# Patient Record
Sex: FEMALE | Race: WHITE | Employment: FULL TIME | URBAN - METROPOLITAN AREA
[De-identification: names, ages, dates, MRNs, and addresses within clinical notes are randomized per-mention and may not be internally consistent; named-entity substitution may affect disease eponyms.]

---

## 2017-01-19 ENCOUNTER — NURSE ONLY (OUTPATIENT)
Dept: NEPHROLOGY | Facility: CLINIC | Age: 47
End: 2017-01-19

## 2017-01-19 DIAGNOSIS — Z01.84 IMMUNITY STATUS TESTING: Primary | ICD-10-CM

## 2017-01-19 PROCEDURE — 90732 PPSV23 VACC 2 YRS+ SUBQ/IM: CPT | Performed by: INTERNAL MEDICINE

## 2017-01-19 PROCEDURE — 90471 IMMUNIZATION ADMIN: CPT | Performed by: INTERNAL MEDICINE

## 2017-01-24 RX ORDER — ROSUVASTATIN CALCIUM 5 MG/1
5 TABLET, COATED ORAL NIGHTLY
Qty: 30 TABLET | Refills: 5 | Status: SHIPPED | OUTPATIENT
Start: 2017-01-24 | End: 2017-01-27 | Stop reason: DRUGHIGH

## 2017-01-24 RX ORDER — LEVOTHYROXINE SODIUM 88 UG/1
88 TABLET ORAL
Qty: 30 TABLET | Refills: 5 | Status: SHIPPED | OUTPATIENT
Start: 2017-01-24 | End: 2017-02-24

## 2017-01-24 NOTE — TELEPHONE ENCOUNTER
From: Lourdes Thompson  To: Marquis Beka MD  Sent: 1/24/2017 6:17 AM CST  Subject: Medication Renewal Request    Original authorizing provider: MD Lourdes Taylor would like a refill of the following medications:  Rosuvastatin Calci

## 2017-01-26 ENCOUNTER — TELEPHONE (OUTPATIENT)
Dept: NEPHROLOGY | Facility: CLINIC | Age: 47
End: 2017-01-26

## 2017-01-26 DIAGNOSIS — E78.00 PURE HYPERCHOLESTEROLEMIA: ICD-10-CM

## 2017-01-26 DIAGNOSIS — E55.9 VITAMIN D DEFICIENCY: Primary | ICD-10-CM

## 2017-01-26 NOTE — TELEPHONE ENCOUNTER
Labs done 1/19/17 Per Dr. Allison Guess good except bad Cholesterol is a little high. Increase Crestor to 10mg daily. Thyroid good but Vitamin D is very low. Start Vitamin D 2000 International units daily.  Do Lipid panel, Liver panel and Vitamin D level in

## 2017-01-27 RX ORDER — MULTIVIT-MIN/IRON/FOLIC ACID/K 18-600-40
2000 CAPSULE ORAL DAILY
Qty: 30 CAPSULE | Refills: 0 | Status: SHIPPED | OUTPATIENT
Start: 2017-01-27 | End: 2017-02-26

## 2017-01-27 RX ORDER — ROSUVASTATIN CALCIUM 10 MG/1
10 TABLET, COATED ORAL NIGHTLY
Qty: 30 TABLET | Refills: 5 | Status: SHIPPED | OUTPATIENT
Start: 2017-01-27 | End: 2017-03-06

## 2017-01-27 NOTE — TELEPHONE ENCOUNTER
Patient contacted. Results message read. Patient is aware to increase Crestor to 10mg daily and start Vitamin D 2000 International units daily. New prescription sent to pharmacy. Lab orders placed to be done in 2 months. Med list updated.

## 2017-02-08 ENCOUNTER — APPOINTMENT (OUTPATIENT)
Dept: HEMATOLOGY/ONCOLOGY | Facility: HOSPITAL | Age: 47
End: 2017-02-08
Attending: INTERNAL MEDICINE
Payer: COMMERCIAL

## 2017-02-20 ENCOUNTER — OFFICE VISIT (OUTPATIENT)
Dept: HEMATOLOGY/ONCOLOGY | Facility: HOSPITAL | Age: 47
End: 2017-02-20
Attending: NURSE PRACTITIONER
Payer: COMMERCIAL

## 2017-02-20 ENCOUNTER — NURSE ONLY (OUTPATIENT)
Dept: HEMATOLOGY/ONCOLOGY | Facility: HOSPITAL | Age: 47
End: 2017-02-20
Attending: INTERNAL MEDICINE
Payer: COMMERCIAL

## 2017-02-20 VITALS
DIASTOLIC BLOOD PRESSURE: 75 MMHG | RESPIRATION RATE: 16 BRPM | TEMPERATURE: 98 F | WEIGHT: 256 LBS | SYSTOLIC BLOOD PRESSURE: 119 MMHG | HEIGHT: 66 IN | BODY MASS INDEX: 41.14 KG/M2 | HEART RATE: 78 BPM

## 2017-02-20 VITALS
HEART RATE: 78 BPM | RESPIRATION RATE: 16 BRPM | SYSTOLIC BLOOD PRESSURE: 119 MMHG | DIASTOLIC BLOOD PRESSURE: 75 MMHG | TEMPERATURE: 98 F

## 2017-02-20 DIAGNOSIS — Z95.828 PORT CATHETER IN PLACE: ICD-10-CM

## 2017-02-20 DIAGNOSIS — Z45.2 ENCOUNTER FOR ADJUSTMENT OR MANAGEMENT OF VASCULAR ACCESS DEVICE: Primary | ICD-10-CM

## 2017-02-20 DIAGNOSIS — C84.78 ANAPLASTIC LARGE CELL LYMPHOMA, ALK-NEGATIVE, LYMPH NODES OF MULTIPLE SITES (HCC): Primary | ICD-10-CM

## 2017-02-20 LAB
ALBUMIN SERPL BCP-MCNC: 3.6 G/DL (ref 3.5–4.8)
ALBUMIN/GLOB SERPL: 1.2 {RATIO} (ref 1–2)
ALP SERPL-CCNC: 75 U/L (ref 32–100)
ALT SERPL-CCNC: 22 U/L (ref 14–54)
ANION GAP SERPL CALC-SCNC: 10 MMOL/L (ref 0–18)
AST SERPL-CCNC: 28 U/L (ref 15–41)
BASOPHILS # BLD: 0.1 K/UL (ref 0–0.2)
BASOPHILS NFR BLD: 1 %
BILIRUB SERPL-MCNC: 0.6 MG/DL (ref 0.3–1.2)
BUN SERPL-MCNC: 9 MG/DL (ref 8–20)
BUN/CREAT SERPL: 12.2 (ref 10–20)
CALCIUM SERPL-MCNC: 9 MG/DL (ref 8.5–10.5)
CHLORIDE SERPL-SCNC: 104 MMOL/L (ref 95–110)
CO2 SERPL-SCNC: 26 MMOL/L (ref 22–32)
CREAT SERPL-MCNC: 0.74 MG/DL (ref 0.5–1.5)
EOSINOPHIL # BLD: 0.1 K/UL (ref 0–0.7)
EOSINOPHIL NFR BLD: 2 %
ERYTHROCYTE [DISTWIDTH] IN BLOOD BY AUTOMATED COUNT: 14.7 % (ref 11–15)
GLOBULIN PLAS-MCNC: 3.1 G/DL (ref 2.5–3.7)
GLUCOSE SERPL-MCNC: 136 MG/DL (ref 70–99)
HCT VFR BLD AUTO: 41.8 % (ref 35–48)
HGB BLD-MCNC: 13.7 G/DL (ref 12–16)
LDH SERPL L TO P-CCNC: 153 U/L (ref 98–192)
LYMPHOCYTES # BLD: 2.5 K/UL (ref 1–4)
LYMPHOCYTES NFR BLD: 39 %
MCH RBC QN AUTO: 29.1 PG (ref 27–32)
MCHC RBC AUTO-ENTMCNC: 32.8 G/DL (ref 32–37)
MCV RBC AUTO: 88.7 FL (ref 80–100)
MONOCYTES # BLD: 0.4 K/UL (ref 0–1)
MONOCYTES NFR BLD: 7 %
NEUTROPHILS # BLD AUTO: 3.3 K/UL (ref 1.8–7.7)
NEUTROPHILS NFR BLD: 51 %
OSMOLALITY UR CALC.SUM OF ELEC: 291 MOSM/KG (ref 275–295)
PLATELET # BLD AUTO: 200 K/UL (ref 140–400)
PMV BLD AUTO: 8.7 FL (ref 7.4–10.3)
POTASSIUM SERPL-SCNC: 3.4 MMOL/L (ref 3.3–5.1)
PROT SERPL-MCNC: 6.7 G/DL (ref 5.9–8.4)
RBC # BLD AUTO: 4.71 M/UL (ref 3.7–5.4)
SODIUM SERPL-SCNC: 140 MMOL/L (ref 136–144)
WBC # BLD AUTO: 6.4 K/UL (ref 4–11)

## 2017-02-20 PROCEDURE — 99212 OFFICE O/P EST SF 10 MIN: CPT | Performed by: NURSE PRACTITIONER

## 2017-02-20 PROCEDURE — 36591 DRAW BLOOD OFF VENOUS DEVICE: CPT

## 2017-02-20 PROCEDURE — 83615 LACTATE (LD) (LDH) ENZYME: CPT

## 2017-02-20 PROCEDURE — 99213 OFFICE O/P EST LOW 20 MIN: CPT | Performed by: NURSE PRACTITIONER

## 2017-02-20 PROCEDURE — 85025 COMPLETE CBC W/AUTO DIFF WBC: CPT

## 2017-02-20 PROCEDURE — 80053 COMPREHEN METABOLIC PANEL: CPT

## 2017-02-20 RX ORDER — HEPARIN SODIUM (PORCINE) LOCK FLUSH IV SOLN 100 UNIT/ML 100 UNIT/ML
5 SOLUTION INTRAVENOUS ONCE
Status: COMPLETED | OUTPATIENT
Start: 2017-02-20 | End: 2017-02-20

## 2017-02-20 RX ADMIN — HEPARIN SODIUM (PORCINE) LOCK FLUSH IV SOLN 100 UNIT/ML 500 UNITS: 100 SOLUTION INTRAVENOUS at 13:55:00

## 2017-02-20 NOTE — PROGRESS NOTES
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 Karoline Rahman is seen in the office for follow up regarding ALK negative an daily. Disp: 30 capsule Rfl: 0   Levothyroxine Sodium 88 MCG Oral Tab Take 1 tablet (88 mcg total) by mouth before breakfast. Disp: 30 tablet Rfl: 5   magnesium oxide 400 MG Oral Tab Take 400 mg by mouth daily.  Disp:  Rfl:      Allergies:     Aspirin Hemochromatosis   • Melanoma Maternal Grandfather          PHYSICAL EXAM:    /75 mmHg  Pulse 78  Temp(Src) 97.8 °F (36.6 °C) (Oral)  Resp 16  Ht 1.676 m (5' 6\")  Wt 116.121 kg (256 lb)  BMI 41.34 kg/m2    Physical Exam   Constitutional: She is orien evidence of recurrence. Labs were reviewed and shows normal labs including LDH. Will continue close monitoring. Surveillance CT imaging in July shows no evidence of recurrence. All questions were answered to satisfaction.    Pt is 2 years out from [facility] Absolute 3.3 1.8-7.7 K/UL   Lymphocyte Absolute 2.5 1.0-4.0 K/UL   Monocyte Absolute 0.4 0.0-1.0 K/UL   Eosinophil Absolute 0.1 0.0-0.7 K/UL   Basophil Absolute 0.1 0.0-0.2 K/UL       Meds This Visit:        Imaging & Referrals:  None   No orders of the de

## 2017-02-20 NOTE — PROGRESS NOTES
Pt to infusion for labs and port flush. Arrived stable and ambulatory. Reports feeling well, offers no complaints. Port accessed using sterile technique, line flushing well with positive blood return noted. Labs were collected and sent.  Port flushed per pr

## 2017-02-24 RX ORDER — LEVOTHYROXINE SODIUM 88 UG/1
88 TABLET ORAL
Qty: 30 TABLET | Refills: 5 | Status: SHIPPED | OUTPATIENT
Start: 2017-02-24 | End: 2017-03-31

## 2017-02-24 NOTE — TELEPHONE ENCOUNTER
From: Ross Sousa  To: Clotilde Mack MD  Sent: 2/24/2017 7:33 AM CST  Subject: Medication Renewal Request    Original authorizing provider: MD Ross Soto would like a refill of the following medications:  Levothyroxine Sodi

## 2017-03-06 RX ORDER — ROSUVASTATIN CALCIUM 10 MG/1
10 TABLET, COATED ORAL NIGHTLY
Qty: 30 TABLET | Refills: 5 | Status: SHIPPED | OUTPATIENT
Start: 2017-03-06 | End: 2017-04-17

## 2017-03-06 NOTE — TELEPHONE ENCOUNTER
From: Guzman Ruff  To: Fredi Sanchez MD  Sent: 3/4/2017 10:17 PM CST  Subject: Medication Renewal Request    Original authorizing provider: MD Guzman Izquierdo would like a refill of the following medications:  Rosuvastatin Calci

## 2017-03-16 ENCOUNTER — TELEPHONE (OUTPATIENT)
Dept: HEMATOLOGY/ONCOLOGY | Facility: HOSPITAL | Age: 47
End: 2017-03-16

## 2017-03-16 NOTE — TELEPHONE ENCOUNTER
I returned Daisy's call. She reports for the last 6 days she has had a red/pink splotchy rash on her face. It goes from her chin up to her checks. It is not raised or ictchy.  \"It starts in the morning and fades away before 2pm.  My face is warm to touc

## 2017-03-17 ENCOUNTER — OFFICE VISIT (OUTPATIENT)
Dept: HEMATOLOGY/ONCOLOGY | Facility: HOSPITAL | Age: 47
End: 2017-03-17
Attending: INTERNAL MEDICINE
Payer: COMMERCIAL

## 2017-03-17 VITALS
DIASTOLIC BLOOD PRESSURE: 83 MMHG | RESPIRATION RATE: 18 BRPM | HEART RATE: 85 BPM | BODY MASS INDEX: 40.66 KG/M2 | SYSTOLIC BLOOD PRESSURE: 122 MMHG | HEIGHT: 66 IN | WEIGHT: 253 LBS | TEMPERATURE: 98 F

## 2017-03-17 DIAGNOSIS — C84.78 ANAPLASTIC LARGE CELL LYMPHOMA, ALK-NEGATIVE, LYMPH NODES OF MULTIPLE SITES (HCC): Primary | ICD-10-CM

## 2017-03-17 PROCEDURE — 99214 OFFICE O/P EST MOD 30 MIN: CPT | Performed by: INTERNAL MEDICINE

## 2017-03-17 PROCEDURE — 99212 OFFICE O/P EST SF 10 MIN: CPT | Performed by: INTERNAL MEDICINE

## 2017-03-17 NOTE — PROGRESS NOTES
HPI     Ms. Reese Wing is seen in the office for follow up regarding ALK negative ALCL. Pt reports new redness of face, arms and thighs for the past 1 week. Also noted some subcut nodules of the forearms and thighs.  Pt is very anxious that lymphoma is recurr Allergies:     Aspirin                     Comment:Other reaction(s): GI problems    Past Medical History   Diagnosis Date   • Anaplastic large cell lymphoma, unspecified site, extranodal and solid organ sites 05/29/2014   • Swelling of limb 05/20/2014 She is oriented to person, place, and time. She appears well-developed and well-nourished. No distress. HENT:   Head: Normocephalic and atraumatic. Nose: Nose normal.   Mouth/Throat: Oropharynx is clear and moist. No oropharyngeal exudate.    Eyes: Conj . Plan CT C/A/P to evaluate for recurrence. All questions were answered to satisfaction. Pt is 2+ years out from diagnosis now. If scans are stable, She will return to the office in 6 months for follow up with repeat labs.    All questions were an

## 2017-03-26 ENCOUNTER — HOSPITAL ENCOUNTER (OUTPATIENT)
Dept: CT IMAGING | Facility: HOSPITAL | Age: 47
Discharge: HOME | End: 2017-03-26
Attending: NURSE PRACTITIONER
Payer: COMMERCIAL

## 2017-03-26 DIAGNOSIS — C84.78 ANAPLASTIC LARGE CELL LYMPHOMA, ALK-NEGATIVE, LYMPH NODES OF MULTIPLE SITES (HCC): ICD-10-CM

## 2017-03-26 LAB — CREAT BLD-MCNC: 0.7 MG/DL (ref 0.5–1.5)

## 2017-03-26 PROCEDURE — 70491 CT SOFT TISSUE NECK W/DYE: CPT

## 2017-03-26 PROCEDURE — 71260 CT THORAX DX C+: CPT

## 2017-03-26 PROCEDURE — 82565 ASSAY OF CREATININE: CPT

## 2017-03-26 PROCEDURE — 74177 CT ABD & PELVIS W/CONTRAST: CPT

## 2017-03-31 RX ORDER — LEVOTHYROXINE SODIUM 88 UG/1
88 TABLET ORAL
Qty: 30 TABLET | Refills: 2 | Status: SHIPPED | OUTPATIENT
Start: 2017-03-31 | End: 2017-05-01

## 2017-03-31 NOTE — TELEPHONE ENCOUNTER
From: Tabatha Miranda  To: Pricilla Roberts MD  Sent: 3/31/2017 6:51 AM CDT  Subject: Medication Renewal Request    Original authorizing provider: MD Tabatha Buitrago would like a refill of the following medications:  Levothyroxine Sodi

## 2017-04-03 ENCOUNTER — NURSE ONLY (OUTPATIENT)
Dept: HEMATOLOGY/ONCOLOGY | Facility: HOSPITAL | Age: 47
End: 2017-04-03
Attending: INTERNAL MEDICINE
Payer: COMMERCIAL

## 2017-04-03 DIAGNOSIS — Z95.828 PORT CATHETER IN PLACE: ICD-10-CM

## 2017-04-03 DIAGNOSIS — Z45.2 ENCOUNTER FOR ADJUSTMENT OR MANAGEMENT OF VASCULAR ACCESS DEVICE: Primary | ICD-10-CM

## 2017-04-03 PROCEDURE — 96523 IRRIG DRUG DELIVERY DEVICE: CPT

## 2017-04-03 RX ORDER — HEPARIN SODIUM (PORCINE) LOCK FLUSH IV SOLN 100 UNIT/ML 100 UNIT/ML
5 SOLUTION INTRAVENOUS ONCE
Status: COMPLETED | OUTPATIENT
Start: 2017-04-03 | End: 2017-04-03

## 2017-04-03 RX ORDER — 0.9 % SODIUM CHLORIDE 0.9 %
VIAL (ML) INJECTION
Status: DISCONTINUED
Start: 2017-04-03 | End: 2017-04-03

## 2017-04-03 RX ORDER — HEPARIN SODIUM (PORCINE) LOCK FLUSH IV SOLN 100 UNIT/ML 100 UNIT/ML
SOLUTION INTRAVENOUS
Status: DISCONTINUED
Start: 2017-04-03 | End: 2017-04-03

## 2017-04-03 RX ADMIN — HEPARIN SODIUM (PORCINE) LOCK FLUSH IV SOLN 100 UNIT/ML 500 UNITS: 100 SOLUTION INTRAVENOUS at 16:07:00

## 2017-04-03 NOTE — PROGRESS NOTES
Labs from therapy plan released in error. Patient returning on 5/15/17 for lab draw from [facility]. Right chest port accessed per sterile technique. Blood return obtained. Port flushed per protocol and montgomery needle removed.   Gauze and paper tape to site  Crys Beckford

## 2017-04-17 RX ORDER — ROSUVASTATIN CALCIUM 10 MG/1
10 TABLET, COATED ORAL NIGHTLY
Qty: 30 TABLET | Refills: 2 | Status: SHIPPED | OUTPATIENT
Start: 2017-04-17 | End: 2017-05-30

## 2017-04-17 NOTE — TELEPHONE ENCOUNTER
From: Ross Sousa  To: Clotilde Mack MD  Sent: 4/16/2017 10:15 PM CDT  Subject: Medication Renewal Request    Original authorizing provider: MD Ross Soto would like a refill of the following medications:  Rosuvastatin Calc

## 2017-05-01 ENCOUNTER — APPOINTMENT (OUTPATIENT)
Dept: HEMATOLOGY/ONCOLOGY | Facility: HOSPITAL | Age: 47
End: 2017-05-01
Attending: INTERNAL MEDICINE
Payer: COMMERCIAL

## 2017-05-01 RX ORDER — LEVOTHYROXINE SODIUM 88 UG/1
88 TABLET ORAL
Qty: 30 TABLET | Refills: 2 | Status: SHIPPED | OUTPATIENT
Start: 2017-05-01 | End: 2017-06-02

## 2017-05-01 NOTE — TELEPHONE ENCOUNTER
From: Lynne Bowen  To: Armin Norwood MD  Sent: 5/1/2017 5:57 AM CDT  Subject: Medication Renewal Request    Original authorizing provider: MD Lynne Almendarez would like a refill of the following medications:  Levothyroxine Sodiu

## 2017-05-10 ENCOUNTER — TELEPHONE (OUTPATIENT)
Dept: HEMATOLOGY/ONCOLOGY | Facility: HOSPITAL | Age: 47
End: 2017-05-10

## 2017-05-11 ENCOUNTER — TELEPHONE (OUTPATIENT)
Dept: HEMATOLOGY/ONCOLOGY | Facility: HOSPITAL | Age: 47
End: 2017-05-11

## 2017-05-11 NOTE — TELEPHONE ENCOUNTER
Amanda Ricardo returning phone call. Please contact her after 3pm. She is a teacher and in class.  florian

## 2017-05-12 ENCOUNTER — TELEPHONE (OUTPATIENT)
Dept: HEMATOLOGY/ONCOLOGY | Facility: HOSPITAL | Age: 47
End: 2017-05-12

## 2017-05-12 NOTE — TELEPHONE ENCOUNTER
Patient returned call- Will follow up with Dr. Dimitri Rodriguez. Patient is in agreement with appointment date and time. Encouraged to call with any questions and concerns.

## 2017-05-15 ENCOUNTER — NURSE ONLY (OUTPATIENT)
Dept: HEMATOLOGY/ONCOLOGY | Facility: HOSPITAL | Age: 47
End: 2017-05-15
Attending: INTERNAL MEDICINE
Payer: COMMERCIAL

## 2017-05-15 DIAGNOSIS — Z95.828 PORT CATHETER IN PLACE: ICD-10-CM

## 2017-05-15 DIAGNOSIS — Z45.2 ENCOUNTER FOR ADJUSTMENT OR MANAGEMENT OF VASCULAR ACCESS DEVICE: Primary | ICD-10-CM

## 2017-05-15 PROCEDURE — 83615 LACTATE (LD) (LDH) ENZYME: CPT

## 2017-05-15 PROCEDURE — 36591 DRAW BLOOD OFF VENOUS DEVICE: CPT

## 2017-05-15 PROCEDURE — 80053 COMPREHEN METABOLIC PANEL: CPT

## 2017-05-15 PROCEDURE — 85025 COMPLETE CBC W/AUTO DIFF WBC: CPT

## 2017-05-15 RX ORDER — 0.9 % SODIUM CHLORIDE 0.9 %
VIAL (ML) INJECTION
Status: DISCONTINUED
Start: 2017-05-15 | End: 2017-05-15

## 2017-05-15 RX ORDER — HEPARIN SODIUM (PORCINE) LOCK FLUSH IV SOLN 100 UNIT/ML 100 UNIT/ML
5 SOLUTION INTRAVENOUS ONCE
Status: COMPLETED | OUTPATIENT
Start: 2017-05-15 | End: 2017-05-15

## 2017-05-15 RX ORDER — HEPARIN SODIUM (PORCINE) LOCK FLUSH IV SOLN 100 UNIT/ML 100 UNIT/ML
SOLUTION INTRAVENOUS
Status: COMPLETED
Start: 2017-05-15 | End: 2017-05-15

## 2017-05-15 RX ADMIN — HEPARIN SODIUM (PORCINE) LOCK FLUSH IV SOLN 100 UNIT/ML 500 UNITS: 100 SOLUTION INTRAVENOUS at 16:10:00

## 2017-05-15 NOTE — PROGRESS NOTES
Pt brought back to lab ambulating without assistance. Port accessed using sterile technique without complication, blood return noted. Labs drawn and sent. Line flushed with 20 cc saline and 500 unit heparin.   Port de-accessed without complication, gauze

## 2017-05-29 DIAGNOSIS — E03.9 HYPOTHYROIDISM, UNSPECIFIED TYPE: ICD-10-CM

## 2017-05-29 DIAGNOSIS — E78.00 HYPERCHOLESTEREMIA: Primary | ICD-10-CM

## 2017-05-30 RX ORDER — ROSUVASTATIN CALCIUM 10 MG/1
10 TABLET, COATED ORAL NIGHTLY
Qty: 30 TABLET | Refills: 1 | Status: SHIPPED | OUTPATIENT
Start: 2017-05-30 | End: 2017-06-23

## 2017-05-30 NOTE — TELEPHONE ENCOUNTER
From: Micha Carbajal  To: Alpa Trevino MD  Sent: 5/29/2017 9:58 PM CDT  Subject: Medication Renewal Request    Original authorizing provider: MD Micha Waite would like a refill of the following medications:  Rosuvastatin Calci

## 2017-05-31 NOTE — TELEPHONE ENCOUNTER
Spoke to pt. Notified her to do labs and schedule appt per [facility]. Appt scheduled for 6/23/17. Advised to fast for 12 hours for labs.

## 2017-06-02 RX ORDER — LEVOTHYROXINE SODIUM 88 UG/1
88 TABLET ORAL
Qty: 30 TABLET | Refills: 0 | Status: SHIPPED | OUTPATIENT
Start: 2017-06-02 | End: 2017-07-08

## 2017-06-02 NOTE — TELEPHONE ENCOUNTER
From: Jose Beth  To: Javier Benítez MD  Sent: 6/1/2017 9:20 PM CDT  Subject: Medication Renewal Request    Original authorizing provider: MD Jose Aguilar would like a refill of the following medications:  Levothyroxine Sodiu

## 2017-06-19 ENCOUNTER — TELEPHONE (OUTPATIENT)
Dept: HEMATOLOGY/ONCOLOGY | Facility: HOSPITAL | Age: 47
End: 2017-06-19

## 2017-06-19 ENCOUNTER — APPOINTMENT (OUTPATIENT)
Dept: LAB | Age: 47
End: 2017-06-19
Attending: INTERNAL MEDICINE
Payer: COMMERCIAL

## 2017-06-19 DIAGNOSIS — E03.9 HYPOTHYROIDISM, UNSPECIFIED TYPE: ICD-10-CM

## 2017-06-19 DIAGNOSIS — E78.00 HYPERCHOLESTEREMIA: ICD-10-CM

## 2017-06-19 PROCEDURE — 36415 COLL VENOUS BLD VENIPUNCTURE: CPT

## 2017-06-19 PROCEDURE — 82306 VITAMIN D 25 HYDROXY: CPT

## 2017-06-19 PROCEDURE — 80061 LIPID PANEL: CPT

## 2017-06-19 PROCEDURE — 84443 ASSAY THYROID STIM HORMONE: CPT

## 2017-06-19 PROCEDURE — 81001 URINALYSIS AUTO W/SCOPE: CPT

## 2017-06-23 ENCOUNTER — OFFICE VISIT (OUTPATIENT)
Dept: NEPHROLOGY | Facility: CLINIC | Age: 47
End: 2017-06-23

## 2017-06-23 VITALS
BODY MASS INDEX: 41.17 KG/M2 | WEIGHT: 256.19 LBS | DIASTOLIC BLOOD PRESSURE: 62 MMHG | SYSTOLIC BLOOD PRESSURE: 93 MMHG | HEIGHT: 66 IN | HEART RATE: 83 BPM

## 2017-06-23 DIAGNOSIS — E78.00 HYPERCHOLESTEREMIA: Primary | ICD-10-CM

## 2017-06-23 DIAGNOSIS — E03.9 HYPOTHYROIDISM, UNSPECIFIED TYPE: ICD-10-CM

## 2017-06-23 PROCEDURE — 99212 OFFICE O/P EST SF 10 MIN: CPT | Performed by: INTERNAL MEDICINE

## 2017-06-23 PROCEDURE — 99214 OFFICE O/P EST MOD 30 MIN: CPT | Performed by: INTERNAL MEDICINE

## 2017-06-23 RX ORDER — ROSUVASTATIN CALCIUM 20 MG/1
20 TABLET, COATED ORAL NIGHTLY
Qty: 30 TABLET | Refills: 5 | Status: SHIPPED | OUTPATIENT
Start: 2017-06-23 | End: 2017-08-10

## 2017-06-23 NOTE — PROGRESS NOTES
[facility]  Nephrology Daily Progress Note    Ni Barahona  [id number]  50year old      HPI:   Ni Barahona is a 50year old female.   50year old female with a history of allergic rhinitis, sleep apnea, seronegative rheumatoid arth normal respiratory effort  Cardiovascular: regular rate and rhythm no murmurs, gallups, or rubs  Abdomen: soft non-tender non-distended no organomegaly noted no masses  Musculoskeletal: full ROM all extremities good strength  no deformities  Extremities: n

## 2017-07-10 RX ORDER — LEVOTHYROXINE SODIUM 88 UG/1
88 TABLET ORAL
Qty: 30 TABLET | Refills: 5 | Status: SHIPPED
Start: 2017-07-10 | End: 2017-08-10

## 2017-07-10 NOTE — TELEPHONE ENCOUNTER
From: Karo Day  Sent: 7/8/2017 5:16 AM CDT  Subject: Medication Renewal Request    Karo Day would like a refill of the following medications:  Levothyroxine Sodium 88 MCG Oral Tab Washington Black MD]    Preferred pharmacy: [facility]

## 2017-08-10 RX ORDER — LEVOTHYROXINE SODIUM 88 UG/1
88 TABLET ORAL
Qty: 30 TABLET | Refills: 5 | Status: SHIPPED
Start: 2017-08-10 | End: 2017-09-12

## 2017-08-10 RX ORDER — ROSUVASTATIN CALCIUM 20 MG/1
20 TABLET, COATED ORAL NIGHTLY
Qty: 30 TABLET | Refills: 5 | Status: SHIPPED
Start: 2017-08-10 | End: 2017-09-12

## 2017-08-10 NOTE — TELEPHONE ENCOUNTER
From: Cami Finn  Sent: 8/10/2017 7:22 AM CDT  Subject: Medication Renewal Request    Cami Finn would like a refill of the following medications:  Rosuvastatin Calcium 20 MG Oral Tab Dillon Cosby MD]  Levothyroxine Sodium 88 MCG Oral Tab

## 2017-08-15 ENCOUNTER — NURSE ONLY (OUTPATIENT)
Dept: HEMATOLOGY/ONCOLOGY | Facility: HOSPITAL | Age: 47
End: 2017-08-15
Attending: INTERNAL MEDICINE
Payer: COMMERCIAL

## 2017-08-15 VITALS
SYSTOLIC BLOOD PRESSURE: 118 MMHG | DIASTOLIC BLOOD PRESSURE: 74 MMHG | RESPIRATION RATE: 18 BRPM | TEMPERATURE: 98 F | HEART RATE: 75 BPM

## 2017-08-15 DIAGNOSIS — Z45.2 ENCOUNTER FOR ADJUSTMENT OR MANAGEMENT OF VASCULAR ACCESS DEVICE: Primary | ICD-10-CM

## 2017-08-15 DIAGNOSIS — Z95.828 PORT CATHETER IN PLACE: ICD-10-CM

## 2017-08-15 LAB
ALBUMIN SERPL BCP-MCNC: 3.8 G/DL (ref 3.5–4.8)
ALBUMIN/GLOB SERPL: 1.4 {RATIO} (ref 1–2)
ALP SERPL-CCNC: 67 U/L (ref 32–100)
ALT SERPL-CCNC: 27 U/L (ref 14–54)
ANION GAP SERPL CALC-SCNC: 10 MMOL/L (ref 0–18)
AST SERPL-CCNC: 27 U/L (ref 15–41)
BASOPHILS # BLD: 0.1 K/UL (ref 0–0.2)
BASOPHILS NFR BLD: 1 %
BILIRUB SERPL-MCNC: 0.4 MG/DL (ref 0.3–1.2)
BUN SERPL-MCNC: 8 MG/DL (ref 8–20)
BUN/CREAT SERPL: 12.5 (ref 10–20)
CALCIUM SERPL-MCNC: 8.8 MG/DL (ref 8.5–10.5)
CHLORIDE SERPL-SCNC: 103 MMOL/L (ref 95–110)
CO2 SERPL-SCNC: 26 MMOL/L (ref 22–32)
CREAT SERPL-MCNC: 0.64 MG/DL (ref 0.5–1.5)
EOSINOPHIL # BLD: 0.1 K/UL (ref 0–0.7)
EOSINOPHIL NFR BLD: 2 %
ERYTHROCYTE [DISTWIDTH] IN BLOOD BY AUTOMATED COUNT: 14.6 % (ref 11–15)
GLOBULIN PLAS-MCNC: 2.7 G/DL (ref 2.5–3.7)
GLUCOSE SERPL-MCNC: 97 MG/DL (ref 70–99)
HCT VFR BLD AUTO: 41.5 % (ref 35–48)
HGB BLD-MCNC: 14 G/DL (ref 12–16)
LDH SERPL L TO P-CCNC: 190 U/L (ref 98–192)
LYMPHOCYTES # BLD: 2.7 K/UL (ref 1–4)
LYMPHOCYTES NFR BLD: 42 %
MCH RBC QN AUTO: 29.7 PG (ref 27–32)
MCHC RBC AUTO-ENTMCNC: 33.7 G/DL (ref 32–37)
MCV RBC AUTO: 88.3 FL (ref 80–100)
MONOCYTES # BLD: 0.5 K/UL (ref 0–1)
MONOCYTES NFR BLD: 8 %
NEUTROPHILS # BLD AUTO: 3 K/UL (ref 1.8–7.7)
NEUTROPHILS NFR BLD: 48 %
OSMOLALITY UR CALC.SUM OF ELEC: 286 MOSM/KG (ref 275–295)
PLATELET # BLD AUTO: 192 K/UL (ref 140–400)
PMV BLD AUTO: 8.3 FL (ref 7.4–10.3)
POTASSIUM SERPL-SCNC: 3.7 MMOL/L (ref 3.3–5.1)
PROT SERPL-MCNC: 6.5 G/DL (ref 5.9–8.4)
RBC # BLD AUTO: 4.7 M/UL (ref 3.7–5.4)
SODIUM SERPL-SCNC: 139 MMOL/L (ref 136–144)
WBC # BLD AUTO: 6.4 K/UL (ref 4–11)

## 2017-08-15 PROCEDURE — 80053 COMPREHEN METABOLIC PANEL: CPT

## 2017-08-15 PROCEDURE — 36591 DRAW BLOOD OFF VENOUS DEVICE: CPT

## 2017-08-15 PROCEDURE — 85025 COMPLETE CBC W/AUTO DIFF WBC: CPT

## 2017-08-15 PROCEDURE — 83615 LACTATE (LD) (LDH) ENZYME: CPT

## 2017-08-15 RX ORDER — HEPARIN SODIUM (PORCINE) LOCK FLUSH IV SOLN 100 UNIT/ML 100 UNIT/ML
SOLUTION INTRAVENOUS
Status: COMPLETED
Start: 2017-08-15 | End: 2017-08-15

## 2017-08-15 RX ORDER — 0.9 % SODIUM CHLORIDE 0.9 %
VIAL (ML) INJECTION
Status: DISCONTINUED
Start: 2017-08-15 | End: 2017-08-15

## 2017-08-15 RX ORDER — HEPARIN SODIUM (PORCINE) LOCK FLUSH IV SOLN 100 UNIT/ML 100 UNIT/ML
5 SOLUTION INTRAVENOUS ONCE
Status: CANCELLED | OUTPATIENT
Start: 2017-08-15

## 2017-08-15 RX ORDER — 0.9 % SODIUM CHLORIDE 0.9 %
10 VIAL (ML) INJECTION ONCE
Status: CANCELLED | OUTPATIENT
Start: 2017-08-15

## 2017-08-15 RX ORDER — HEPARIN SODIUM (PORCINE) LOCK FLUSH IV SOLN 100 UNIT/ML 100 UNIT/ML
5 SOLUTION INTRAVENOUS ONCE
Status: COMPLETED | OUTPATIENT
Start: 2017-08-15 | End: 2017-08-15

## 2017-08-15 RX ADMIN — HEPARIN SODIUM (PORCINE) LOCK FLUSH IV SOLN 100 UNIT/ML 500 UNITS: 100 SOLUTION INTRAVENOUS at 15:45:00

## 2017-08-15 NOTE — PROGRESS NOTES
Pt ambulated to infusion with steady gait. Port accessed using sterile technique good blood return noted. Labs CBC, CMP, LDH drawn and sent.     Port flushed with 20 cc saline and 500 unit heparin, de-accessed without complication,band aid applied to site

## 2017-08-16 ENCOUNTER — TELEPHONE (OUTPATIENT)
Dept: OTOLARYNGOLOGY | Facility: CLINIC | Age: 47
End: 2017-08-16

## 2017-08-16 ENCOUNTER — OFFICE VISIT (OUTPATIENT)
Dept: HEMATOLOGY/ONCOLOGY | Facility: HOSPITAL | Age: 47
End: 2017-08-16
Attending: INTERNAL MEDICINE
Payer: COMMERCIAL

## 2017-08-16 ENCOUNTER — TELEPHONE (OUTPATIENT)
Dept: HEMATOLOGY/ONCOLOGY | Facility: HOSPITAL | Age: 47
End: 2017-08-16

## 2017-08-16 VITALS
TEMPERATURE: 98 F | DIASTOLIC BLOOD PRESSURE: 76 MMHG | RESPIRATION RATE: 18 BRPM | SYSTOLIC BLOOD PRESSURE: 123 MMHG | HEIGHT: 66 IN | BODY MASS INDEX: 40.82 KG/M2 | HEART RATE: 88 BPM | WEIGHT: 254 LBS

## 2017-08-16 DIAGNOSIS — C84.70 ANAPLASTIC ALK-NEGATIVE LARGE CELL LYMPHOMA, UNSPECIFIED BODY REGION (HCC): Primary | ICD-10-CM

## 2017-08-16 PROCEDURE — 99215 OFFICE O/P EST HI 40 MIN: CPT | Performed by: INTERNAL MEDICINE

## 2017-08-16 PROCEDURE — 99212 OFFICE O/P EST SF 10 MIN: CPT | Performed by: INTERNAL MEDICINE

## 2017-08-16 NOTE — PROGRESS NOTES
Date of Visit: 8/16/17    Chief Complaint: ALCL    HPI:  Ms. Kyle Baldwin is seen in the office for follow up regarding ALK negative ALCL. Patient was treated in the past with 6 cycles of cytoxan/adriamycin/vincristine from 6/2014 to 9/24/14.  She also underwent light-headedness and headaches. Hematological: Negative for adenopathy. Does not bruise/bleed easily. Psychiatric/Behavioral: Negative for agitation and confusion. The patient is not nervous/anxious.             Current Outpatient Prescriptions:  Rosuva Family History   Problem Relation Age of Onset   • Diabetes Father    • Other[other] [OTHER] Father    • Heart Disease Father    • Thyroid disease Sister    • Heart Disease Other      Grandparents   • Other[other] [OTHER] Mother      Rheumatoid Arthrit palpable LN. Neurological: She is alert and oriented to person, place, and time. Skin: Skin is warm. No rash noted. No erythema (mild erythema of the fac, forearms and thighs. No induration. ). Psychiatric: She has a normal mood and affect.  Her behavi KEATON Vargas 25  [address] [facility]

## 2017-08-16 NOTE — TELEPHONE ENCOUNTER
Call placed to Dr Rosa Isela Farooq office. Dr Nancy Kovacs is authorizing removal of her implanted venous port. Not needed any longer. Ruel Gold at office confirms they will reach out to patient to make appointment to see Dr French Zelaya prior or to schedule the procedure.  Monica

## 2017-08-18 ENCOUNTER — APPOINTMENT (OUTPATIENT)
Dept: HEMATOLOGY/ONCOLOGY | Facility: HOSPITAL | Age: 47
End: 2017-08-18
Attending: INTERNAL MEDICINE
Payer: COMMERCIAL

## 2017-08-21 ENCOUNTER — APPOINTMENT (OUTPATIENT)
Dept: HEMATOLOGY/ONCOLOGY | Facility: HOSPITAL | Age: 47
End: 2017-08-21
Attending: INTERNAL MEDICINE
Payer: COMMERCIAL

## 2017-08-28 ENCOUNTER — TELEPHONE (OUTPATIENT)
Dept: SURGERY | Facility: CLINIC | Age: 47
End: 2017-08-28

## 2017-08-28 NOTE — TELEPHONE ENCOUNTER
Contacted patient. Confirmed date change from 10/20/2017 to 10/06/2017. Patient verbalized understanding and all questions answered. Consent form updated and faxed to [facility].

## 2017-09-06 ENCOUNTER — APPOINTMENT (OUTPATIENT)
Dept: LAB | Age: 47
End: 2017-09-06
Attending: INTERNAL MEDICINE
Payer: COMMERCIAL

## 2017-09-06 DIAGNOSIS — E78.00 HYPERCHOLESTEREMIA: ICD-10-CM

## 2017-09-06 LAB
ALBUMIN SERPL BCP-MCNC: 3.8 G/DL (ref 3.5–4.8)
ALP SERPL-CCNC: 74 U/L (ref 32–100)
ALT SERPL-CCNC: 29 U/L (ref 14–54)
AST SERPL-CCNC: 25 U/L (ref 15–41)
BILIRUB DIRECT SERPL-MCNC: 0.1 MG/DL (ref 0–0.2)
BILIRUB SERPL-MCNC: 0.5 MG/DL (ref 0.3–1.2)
CHOLEST SERPL-MCNC: 145 MG/DL (ref 110–200)
HDLC SERPL-MCNC: 53 MG/DL
LDLC SERPL CALC-MCNC: 78 MG/DL (ref 0–99)
NONHDLC SERPL-MCNC: 92 MG/DL
PROT SERPL-MCNC: 6.9 G/DL (ref 5.9–8.4)
TRIGL SERPL-MCNC: 71 MG/DL (ref 1–149)

## 2017-09-06 PROCEDURE — 80076 HEPATIC FUNCTION PANEL: CPT

## 2017-09-06 PROCEDURE — 36415 COLL VENOUS BLD VENIPUNCTURE: CPT

## 2017-09-06 PROCEDURE — 80061 LIPID PANEL: CPT

## 2017-09-13 RX ORDER — ROSUVASTATIN CALCIUM 20 MG/1
20 TABLET, COATED ORAL NIGHTLY
Qty: 30 TABLET | Refills: 5 | Status: SHIPPED
Start: 2017-09-13 | End: 2017-10-20

## 2017-09-13 RX ORDER — LEVOTHYROXINE SODIUM 88 UG/1
88 TABLET ORAL
Qty: 30 TABLET | Refills: 5 | Status: SHIPPED
Start: 2017-09-13 | End: 2017-10-20

## 2017-09-13 NOTE — TELEPHONE ENCOUNTER
From: Lynne Bowen  Sent: 9/12/2017 9:25 PM CDT  Subject: Medication Renewal Request    Lynne Bowen would like a refill of the following medications:  Rosuvastatin Calcium 20 MG Oral Tab Tania Ayala MD]  Levothyroxine Sodium 88 MCG Oral Tab

## 2017-10-23 RX ORDER — ROSUVASTATIN CALCIUM 20 MG/1
20 TABLET, COATED ORAL NIGHTLY
Qty: 30 TABLET | Refills: 2 | Status: SHIPPED | OUTPATIENT
Start: 2017-10-23 | End: 2017-11-29

## 2017-10-23 RX ORDER — LEVOTHYROXINE SODIUM 88 UG/1
88 TABLET ORAL
Qty: 30 TABLET | Refills: 2 | Status: SHIPPED | OUTPATIENT
Start: 2017-10-23 | End: 2017-11-29

## 2017-10-23 NOTE — TELEPHONE ENCOUNTER
From: Kym Haywood  Sent: 10/20/2017 10:02 PM CDT  Subject: Medication Renewal Request    Kym Haywood would like a refill of the following medications:     Rosuvastatin Calcium 20 MG Oral Tab Jasmin Pires MD]     Levothyroxine Sodium 88 MCG O

## 2017-11-02 ENCOUNTER — TELEPHONE (OUTPATIENT)
Dept: SURGERY | Facility: CLINIC | Age: 47
End: 2017-11-02

## 2017-11-06 ENCOUNTER — OFFICE VISIT (OUTPATIENT)
Dept: OBGYN CLINIC | Facility: CLINIC | Age: 47
End: 2017-11-06

## 2017-11-06 VITALS
WEIGHT: 252 LBS | BODY MASS INDEX: 41 KG/M2 | HEART RATE: 90 BPM | SYSTOLIC BLOOD PRESSURE: 113 MMHG | DIASTOLIC BLOOD PRESSURE: 80 MMHG

## 2017-11-06 DIAGNOSIS — Z01.419 ENCOUNTER FOR GYNECOLOGICAL EXAMINATION: Primary | ICD-10-CM

## 2017-11-06 DIAGNOSIS — Z12.31 ENCOUNTER FOR SCREENING MAMMOGRAM FOR BREAST CANCER: ICD-10-CM

## 2017-11-06 PROCEDURE — 99396 PREV VISIT EST AGE 40-64: CPT | Performed by: OBSTETRICS & GYNECOLOGY

## 2017-11-06 NOTE — PROGRESS NOTES
Kortney Garcia is a 50year old female  No LMP recorded. Patient is not currently having periods (Reason: Chemo). here for annual exam.       Last seen 16. Last pap 2015 normal with neg HPV.   Last mammogram in 2016--dense tissue    NonHodg Lupus erythematosus   • Other[other] [OTHER] Sister      Sjogren's   • Other[other] [OTHER] Brother      Hemochromatosis   • Melanoma Maternal Grandfather        MEDICATIONS:    Current Outpatient Prescriptions:  Rosuvastatin Calcium 20 MG Oral Tab [facility] masses  Psychiatric:  Oriented to time, place, person and situation.  Appropriate mood and affect    Pelvic Exam:  External Genitalia: normal appearance, hair distribution, and no lesions  Urethral Meatus:  normal in size, location, without lesions and prol

## 2017-11-24 ENCOUNTER — HOSPITAL ENCOUNTER (OUTPATIENT)
Dept: MAMMOGRAPHY | Age: 47
Discharge: HOME | End: 2017-11-24
Attending: OBSTETRICS & GYNECOLOGY
Payer: COMMERCIAL

## 2017-11-24 DIAGNOSIS — Z12.31 ENCOUNTER FOR SCREENING MAMMOGRAM FOR BREAST CANCER: ICD-10-CM

## 2017-11-24 PROCEDURE — 77067 SCR MAMMO BI INCL CAD: CPT | Performed by: OBSTETRICS & GYNECOLOGY

## 2017-11-24 PROCEDURE — 77063 BREAST TOMOSYNTHESIS BI: CPT | Performed by: OBSTETRICS & GYNECOLOGY

## 2017-11-29 RX ORDER — LEVOTHYROXINE SODIUM 88 UG/1
88 TABLET ORAL
Qty: 30 TABLET | Refills: 2 | Status: SHIPPED
Start: 2017-11-29 | End: 2017-12-30

## 2017-11-29 RX ORDER — ROSUVASTATIN CALCIUM 20 MG/1
20 TABLET, COATED ORAL NIGHTLY
Qty: 30 TABLET | Refills: 2 | Status: SHIPPED
Start: 2017-11-29 | End: 2017-12-30

## 2017-11-29 NOTE — TELEPHONE ENCOUNTER
Office visits and labs as below. [facility] please advise on refill requests.     Cholesterol Medications  Protocol Criteria:  · Appointment scheduled in the past 12 months or in the next 3 months  · ALT & LDL on file in the past 12 months  · ALT result < 80  · LD

## 2017-11-29 NOTE — TELEPHONE ENCOUNTER
From: Alan Fox  Sent: 11/29/2017 3:39 PM CST  Subject: Medication Renewal Request    Alan Fox would like a refill of the following medications:     Rosuvastatin Calcium 20 MG Oral Tab Lauren Alvarado MD]     Levothyroxine Sodium 88 MCG Or

## 2018-01-02 RX ORDER — LEVOTHYROXINE SODIUM 88 UG/1
88 TABLET ORAL
Qty: 30 TABLET | Refills: 1 | Status: SHIPPED
Start: 2018-01-02 | End: 2018-02-07

## 2018-01-02 RX ORDER — ROSUVASTATIN CALCIUM 20 MG/1
20 TABLET, COATED ORAL NIGHTLY
Qty: 30 TABLET | Refills: 1 | Status: SHIPPED
Start: 2018-01-02 | End: 2018-02-07

## 2018-01-02 NOTE — TELEPHONE ENCOUNTER
From: Indio Watkins  Sent: 12/30/2017 10:49 PM CST  Subject: Medication Renewal Request    Indio Watkins would like a refill of the following medications:     Rosuvastatin Calcium 20 MG Oral Tab Howard Rothman MD]     Levothyroxine Sodium 88 MCG O

## 2018-01-15 PROBLEM — M19.072 PRIMARY OSTEOARTHRITIS OF LEFT FOOT: Status: ACTIVE | Noted: 2018-01-15

## 2018-01-17 PROBLEM — B35.1 ONYCHOMYCOSIS: Status: ACTIVE | Noted: 2018-01-17

## 2018-02-08 RX ORDER — LEVOTHYROXINE SODIUM 88 UG/1
88 TABLET ORAL
Qty: 30 TABLET | Refills: 1 | Status: SHIPPED | OUTPATIENT
Start: 2018-02-08 | End: 2018-03-15

## 2018-02-08 RX ORDER — ROSUVASTATIN CALCIUM 20 MG/1
20 TABLET, COATED ORAL NIGHTLY
Qty: 30 TABLET | Refills: 1 | Status: SHIPPED | OUTPATIENT
Start: 2018-02-08 | End: 2018-03-15

## 2018-02-08 NOTE — TELEPHONE ENCOUNTER
From: Kirk Shore  Sent: 2/7/2018 6:09 PM CST  Subject: Medication Renewal Request    Kirk Shore would like a refill of the following medications:     Rosuvastatin Calcium 20 MG Oral Tab Bisi Srinivasan MD]     Levothyroxine Sodium 88 MCG Oral

## 2018-02-10 ENCOUNTER — LAB ENCOUNTER (OUTPATIENT)
Dept: LAB | Age: 48
End: 2018-02-10
Attending: INTERNAL MEDICINE
Payer: COMMERCIAL

## 2018-02-10 DIAGNOSIS — C84.70 ANAPLASTIC ALK-NEGATIVE LARGE CELL LYMPHOMA, UNSPECIFIED BODY REGION (HCC): ICD-10-CM

## 2018-02-10 LAB
ALBUMIN SERPL BCP-MCNC: 3.6 G/DL (ref 3.5–4.8)
ALBUMIN/GLOB SERPL: 1.1 {RATIO} (ref 1–2)
ALP SERPL-CCNC: 68 U/L (ref 32–100)
ALT SERPL-CCNC: 23 U/L (ref 14–54)
ANION GAP SERPL CALC-SCNC: 7 MMOL/L (ref 0–18)
AST SERPL-CCNC: 28 U/L (ref 15–41)
BASOPHILS # BLD: 0 K/UL (ref 0–0.2)
BASOPHILS NFR BLD: 1 %
BILIRUB SERPL-MCNC: 0.3 MG/DL (ref 0.3–1.2)
BUN SERPL-MCNC: 13 MG/DL (ref 8–20)
BUN/CREAT SERPL: 17.6 (ref 10–20)
CALCIUM SERPL-MCNC: 9 MG/DL (ref 8.5–10.5)
CHLORIDE SERPL-SCNC: 108 MMOL/L (ref 95–110)
CO2 SERPL-SCNC: 26 MMOL/L (ref 22–32)
CREAT SERPL-MCNC: 0.74 MG/DL (ref 0.5–1.5)
EOSINOPHIL # BLD: 0.1 K/UL (ref 0–0.7)
EOSINOPHIL NFR BLD: 2 %
ERYTHROCYTE [DISTWIDTH] IN BLOOD BY AUTOMATED COUNT: 14.3 % (ref 11–15)
GLOBULIN PLAS-MCNC: 3.2 G/DL (ref 2.5–3.7)
GLUCOSE SERPL-MCNC: 93 MG/DL (ref 70–99)
HCT VFR BLD AUTO: 43.9 % (ref 35–48)
HGB BLD-MCNC: 14.5 G/DL (ref 12–16)
LDH SERPL L TO P-CCNC: 176 U/L (ref 98–192)
LYMPHOCYTES # BLD: 2.3 K/UL (ref 1–4)
LYMPHOCYTES NFR BLD: 37 %
MCH RBC QN AUTO: 29.7 PG (ref 27–32)
MCHC RBC AUTO-ENTMCNC: 33.1 G/DL (ref 32–37)
MCV RBC AUTO: 89.7 FL (ref 80–100)
MONOCYTES # BLD: 0.4 K/UL (ref 0–1)
MONOCYTES NFR BLD: 7 %
NEUTROPHILS # BLD AUTO: 3.3 K/UL (ref 1.8–7.7)
NEUTROPHILS NFR BLD: 53 %
OSMOLALITY UR CALC.SUM OF ELEC: 292 MOSM/KG (ref 275–295)
PATIENT FASTING: YES
PLATELET # BLD AUTO: 223 K/UL (ref 140–400)
PMV BLD AUTO: 9 FL (ref 7.4–10.3)
POTASSIUM SERPL-SCNC: 4 MMOL/L (ref 3.3–5.1)
PROT SERPL-MCNC: 6.8 G/DL (ref 5.9–8.4)
RBC # BLD AUTO: 4.9 M/UL (ref 3.7–5.4)
SODIUM SERPL-SCNC: 141 MMOL/L (ref 136–144)
WBC # BLD AUTO: 6.3 K/UL (ref 4–11)

## 2018-02-10 PROCEDURE — 36415 COLL VENOUS BLD VENIPUNCTURE: CPT

## 2018-02-10 PROCEDURE — 85025 COMPLETE CBC W/AUTO DIFF WBC: CPT

## 2018-02-10 PROCEDURE — 83615 LACTATE (LD) (LDH) ENZYME: CPT

## 2018-02-10 PROCEDURE — 80053 COMPREHEN METABOLIC PANEL: CPT

## 2018-02-19 ENCOUNTER — OFFICE VISIT (OUTPATIENT)
Dept: HEMATOLOGY/ONCOLOGY | Facility: HOSPITAL | Age: 48
End: 2018-02-19
Attending: INTERNAL MEDICINE
Payer: COMMERCIAL

## 2018-02-19 VITALS
HEART RATE: 87 BPM | RESPIRATION RATE: 16 BRPM | HEIGHT: 66 IN | SYSTOLIC BLOOD PRESSURE: 112 MMHG | WEIGHT: 257 LBS | TEMPERATURE: 98 F | BODY MASS INDEX: 41.3 KG/M2 | DIASTOLIC BLOOD PRESSURE: 68 MMHG

## 2018-02-19 DIAGNOSIS — Z85.72 ENCOUNTER FOR FOLLOW-UP SURVEILLANCE OF LYMPHOMA: ICD-10-CM

## 2018-02-19 DIAGNOSIS — Z08 ENCOUNTER FOR FOLLOW-UP SURVEILLANCE OF LYMPHOMA: ICD-10-CM

## 2018-02-19 DIAGNOSIS — Z94.84 HISTORY OF STEM CELL TRANSPLANT (HCC): ICD-10-CM

## 2018-02-19 DIAGNOSIS — C84.70 ANAPLASTIC ALK-NEGATIVE LARGE CELL LYMPHOMA, UNSPECIFIED BODY REGION (HCC): Primary | ICD-10-CM

## 2018-02-19 PROCEDURE — 99215 OFFICE O/P EST HI 40 MIN: CPT | Performed by: INTERNAL MEDICINE

## 2018-02-19 PROCEDURE — 99212 OFFICE O/P EST SF 10 MIN: CPT | Performed by: INTERNAL MEDICINE

## 2018-02-19 NOTE — PROGRESS NOTES
Name: Tan Arreola  MRN: [id number]  YOB: 1970  CSN: [id number]  Date of Visit: 2/19/2018      Chief Complaint: ALCL; ALK negative      HPI:  Ms. Karoline Rahman is seen in the office for follow up regarding ALK negative ALCL.   Patient was treat arthralgias, back pain, gait problem, joint swelling, neck pain and neck stiffness. Skin: Negative for pallor and rash. Neurological: Negative for dizziness, seizures, weakness, light-headedness and headaches. Hematological: Negative for adenopathy. Birth control/ protection: Tubal Ligation     Other Topics Concern    Caffeine Concern Yes    Comment: Soda, Coffee - 2 cups daily     Social History Narrative   None on file     Family History   Problem Relation Age of Onset   • Diabetes Father    • Heart adenopathy. Right: No inguinal and no supraclavicular adenopathy present. Left: No inguinal and no supraclavicular adenopathy present. No palpable LN. Neurological: She is alert and oriented to person, place, and time.    Skin: Skin is war diagnosis, prognosis, and general treatment was explained to the patient and the family. Tavo Inman MD  [facility]  [address] [id number]  [address] [place] [address]

## 2018-03-01 ENCOUNTER — TELEPHONE (OUTPATIENT)
Dept: HEMATOLOGY/ONCOLOGY | Facility: HOSPITAL | Age: 48
End: 2018-03-01

## 2018-03-01 DIAGNOSIS — C84.70: Primary | ICD-10-CM

## 2018-03-01 NOTE — TELEPHONE ENCOUNTER
LM indicating order for annual CT scan due this month. Expect a call from call center with insurance approval and instruction on scheduling. Please call me if you do not hear from us.

## 2018-03-15 RX ORDER — ROSUVASTATIN CALCIUM 20 MG/1
20 TABLET, COATED ORAL NIGHTLY
Qty: 30 TABLET | Refills: 0 | Status: SHIPPED | OUTPATIENT
Start: 2018-03-15 | End: 2018-04-22

## 2018-03-15 RX ORDER — LEVOTHYROXINE SODIUM 88 UG/1
88 TABLET ORAL
Qty: 30 TABLET | Refills: 0 | Status: SHIPPED | OUTPATIENT
Start: 2018-03-15 | End: 2018-04-22

## 2018-03-15 NOTE — TELEPHONE ENCOUNTER
From: Jamie Hughes  Sent: 3/15/2018 12:35 PM CDT  Subject: Medication Renewal Request    Jamie Hughes would like a refill of the following medications:     Rosuvastatin Calcium 20 MG Oral Tab Gerald Monroy MD]     Levothyroxine Sodium 88 MCG Or

## 2018-03-15 NOTE — TELEPHONE ENCOUNTER
LOV 6/23/17. No upcoming appointments have been scheduled with Dr. Edgar Tompkins. Medication refilled for 1 month per written protocol in Dr. Ana Maria Rothman absence from the office this week. Last lipid panel was done on 9/6/17.

## 2018-03-21 ENCOUNTER — HOSPITAL ENCOUNTER (OUTPATIENT)
Dept: CT IMAGING | Age: 48
Discharge: HOME | End: 2018-03-21
Attending: INTERNAL MEDICINE
Payer: COMMERCIAL

## 2018-03-21 DIAGNOSIS — C84.70: ICD-10-CM

## 2018-03-21 LAB — CREAT BLD-MCNC: 0.9 MG/DL (ref 0.5–1.5)

## 2018-03-21 PROCEDURE — 74177 CT ABD & PELVIS W/CONTRAST: CPT | Performed by: INTERNAL MEDICINE

## 2018-03-21 PROCEDURE — 71260 CT THORAX DX C+: CPT | Performed by: INTERNAL MEDICINE

## 2018-03-21 PROCEDURE — 70491 CT SOFT TISSUE NECK W/DYE: CPT | Performed by: INTERNAL MEDICINE

## 2018-03-21 PROCEDURE — 82565 ASSAY OF CREATININE: CPT

## 2018-03-22 ENCOUNTER — TELEPHONE (OUTPATIENT)
Dept: HEMATOLOGY/ONCOLOGY | Facility: HOSPITAL | Age: 48
End: 2018-03-22

## 2018-04-20 ENCOUNTER — OFFICE VISIT (OUTPATIENT)
Dept: NEPHROLOGY | Facility: CLINIC | Age: 48
End: 2018-04-20

## 2018-04-20 VITALS
BODY MASS INDEX: 41.89 KG/M2 | WEIGHT: 260.63 LBS | SYSTOLIC BLOOD PRESSURE: 107 MMHG | DIASTOLIC BLOOD PRESSURE: 74 MMHG | HEIGHT: 66 IN | HEART RATE: 78 BPM

## 2018-04-20 DIAGNOSIS — R19.7 DIARRHEA, UNSPECIFIED TYPE: Primary | ICD-10-CM

## 2018-04-20 DIAGNOSIS — E78.00 HYPERCHOLESTEREMIA: ICD-10-CM

## 2018-04-20 DIAGNOSIS — E03.9 HYPOTHYROIDISM, UNSPECIFIED TYPE: ICD-10-CM

## 2018-04-20 PROCEDURE — 99212 OFFICE O/P EST SF 10 MIN: CPT | Performed by: INTERNAL MEDICINE

## 2018-04-20 PROCEDURE — 99214 OFFICE O/P EST MOD 30 MIN: CPT | Performed by: INTERNAL MEDICINE

## 2018-04-20 NOTE — PROGRESS NOTES
[facility]  Nephrology Daily Progress Note    Isabella Sauceda  [id number]  46year old  Patient presents with:  Medication Request      HPI:   Isabella Sauceda is a 46year old female.   18-year-old female with a history of allergic rhinitis, sleep ap lbs 260 lbs 10 oz       Constitutional: appears well hydrated alert and responsive no acute distress noted  Neck/Thyroid: neck is supple without adenopathy  Lymphatic: no abnormal cervical, supraclavicular or axillary adenopathy is noted  Respiratory: norm ASSESSMENT/PLAN:   Assessment   Diarrhea, unspecified type  (primary encounter diagnosis)  Hypercholesteremia  Hypothyroidism, unspecified type    Patient Active Problem List:     Anaplastic large cell lymphoma, ALK-negative, lymph nodes of multiple site

## 2018-04-21 ENCOUNTER — APPOINTMENT (OUTPATIENT)
Dept: LAB | Age: 48
End: 2018-04-21
Attending: INTERNAL MEDICINE
Payer: COMMERCIAL

## 2018-04-21 DIAGNOSIS — E03.9 HYPOTHYROIDISM, UNSPECIFIED TYPE: ICD-10-CM

## 2018-04-21 DIAGNOSIS — E78.00 HYPERCHOLESTEREMIA: ICD-10-CM

## 2018-04-21 DIAGNOSIS — R19.7 DIARRHEA, UNSPECIFIED TYPE: ICD-10-CM

## 2018-04-21 PROCEDURE — 84443 ASSAY THYROID STIM HORMONE: CPT

## 2018-04-21 PROCEDURE — 36415 COLL VENOUS BLD VENIPUNCTURE: CPT

## 2018-04-21 PROCEDURE — 87507 IADNA-DNA/RNA PROBE TQ 12-25: CPT

## 2018-04-21 PROCEDURE — 80061 LIPID PANEL: CPT

## 2018-04-22 ENCOUNTER — TELEPHONE (OUTPATIENT)
Dept: NEPHROLOGY | Facility: CLINIC | Age: 48
End: 2018-04-22

## 2018-04-22 NOTE — TELEPHONE ENCOUNTER
Cholesterol and thyroid were good. CPM.  Stool panel was negative. If diarrhea persists off magnesium needs to see GI for further workup and evaluation.

## 2018-04-23 RX ORDER — ROSUVASTATIN CALCIUM 20 MG/1
20 TABLET, COATED ORAL NIGHTLY
Qty: 90 TABLET | Refills: 1 | Status: SHIPPED | OUTPATIENT
Start: 2018-04-23 | End: 2018-06-11

## 2018-04-23 RX ORDER — LEVOTHYROXINE SODIUM 88 UG/1
88 TABLET ORAL
Qty: 90 TABLET | Refills: 1 | Status: SHIPPED | OUTPATIENT
Start: 2018-04-23 | End: 2018-06-11

## 2018-04-23 NOTE — TELEPHONE ENCOUNTER
From: Tabatha Miranda  Sent: 4/22/2018 8:46 AM CDT  Subject: Medication Renewal Request    Tabatha Miranda would like a refill of the following medications:     Rosuvastatin Calcium 20 MG Oral Tab Raj Young MD]     Levothyroxine Sodium 88 MCG Ora

## 2018-04-23 NOTE — TELEPHONE ENCOUNTER
Pt returned call. Notified her that her cholesterol and thyroid levels were good so CPM. Stool panel negative. Advised her to follow up with GI if diarrhea persists off magnesium. She stated understanding. # for GI provided to pt.

## 2018-06-12 RX ORDER — ROSUVASTATIN CALCIUM 20 MG/1
20 TABLET, COATED ORAL NIGHTLY
Qty: 90 TABLET | Refills: 1 | Status: SHIPPED | OUTPATIENT
Start: 2018-06-12 | End: 2018-11-14

## 2018-06-12 RX ORDER — LEVOTHYROXINE SODIUM 88 UG/1
88 TABLET ORAL
Qty: 90 TABLET | Refills: 1 | Status: SHIPPED | OUTPATIENT
Start: 2018-06-12 | End: 2018-08-31

## 2018-06-12 NOTE — TELEPHONE ENCOUNTER
From: Fitz Fry  Sent: 6/11/2018 7:03 AM CDT  Subject: Medication Renewal Request    Fitz Fry would like a refill of the following medications:     Rosuvastatin Calcium 20 MG Oral Tab Gen Russell MD]     Levothyroxine Sodium 88 MCG Ora

## 2018-08-11 ENCOUNTER — LAB ENCOUNTER (OUTPATIENT)
Dept: LAB | Age: 48
End: 2018-08-11
Attending: INTERNAL MEDICINE
Payer: COMMERCIAL

## 2018-08-11 DIAGNOSIS — C84.70 ANAPLASTIC ALK-NEGATIVE LARGE CELL LYMPHOMA, UNSPECIFIED BODY REGION (HCC): ICD-10-CM

## 2018-08-11 LAB
ALBUMIN SERPL BCP-MCNC: 3.6 G/DL (ref 3.5–4.8)
ALBUMIN/GLOB SERPL: 1.3 {RATIO} (ref 1–2)
ALP SERPL-CCNC: 68 U/L (ref 32–100)
ALT SERPL-CCNC: 25 U/L (ref 14–54)
ANION GAP SERPL CALC-SCNC: 7 MMOL/L (ref 0–18)
AST SERPL-CCNC: 22 U/L (ref 15–41)
BASOPHILS # BLD: 0 K/UL (ref 0–0.2)
BASOPHILS NFR BLD: 1 %
BILIRUB SERPL-MCNC: 0.6 MG/DL (ref 0.3–1.2)
BUN SERPL-MCNC: 12 MG/DL (ref 8–20)
BUN/CREAT SERPL: 17.9 (ref 10–20)
CALCIUM SERPL-MCNC: 9 MG/DL (ref 8.5–10.5)
CHLORIDE SERPL-SCNC: 108 MMOL/L (ref 95–110)
CO2 SERPL-SCNC: 26 MMOL/L (ref 22–32)
CREAT SERPL-MCNC: 0.67 MG/DL (ref 0.5–1.5)
EOSINOPHIL # BLD: 0.1 K/UL (ref 0–0.7)
EOSINOPHIL NFR BLD: 2 %
ERYTHROCYTE [DISTWIDTH] IN BLOOD BY AUTOMATED COUNT: 14.5 % (ref 11–15)
GLOBULIN PLAS-MCNC: 2.7 G/DL (ref 2.5–3.7)
GLUCOSE SERPL-MCNC: 95 MG/DL (ref 70–99)
HCT VFR BLD AUTO: 43.7 % (ref 35–48)
HGB BLD-MCNC: 14.6 G/DL (ref 12–16)
LDH SERPL L TO P-CCNC: 130 U/L (ref 98–192)
LYMPHOCYTES # BLD: 2 K/UL (ref 1–4)
LYMPHOCYTES NFR BLD: 40 %
MCH RBC QN AUTO: 30 PG (ref 27–32)
MCHC RBC AUTO-ENTMCNC: 33.5 G/DL (ref 32–37)
MCV RBC AUTO: 89.7 FL (ref 80–100)
MONOCYTES # BLD: 0.5 K/UL (ref 0–1)
MONOCYTES NFR BLD: 9 %
NEUTROPHILS # BLD AUTO: 2.5 K/UL (ref 1.8–7.7)
NEUTROPHILS NFR BLD: 48 %
OSMOLALITY UR CALC.SUM OF ELEC: 292 MOSM/KG (ref 275–295)
PATIENT FASTING: YES
PLATELET # BLD AUTO: 212 K/UL (ref 140–400)
PMV BLD AUTO: 9.2 FL (ref 7.4–10.3)
POTASSIUM SERPL-SCNC: 4.1 MMOL/L (ref 3.3–5.1)
PROT SERPL-MCNC: 6.3 G/DL (ref 5.9–8.4)
RBC # BLD AUTO: 4.87 M/UL (ref 3.7–5.4)
SODIUM SERPL-SCNC: 141 MMOL/L (ref 136–144)
WBC # BLD AUTO: 5.1 K/UL (ref 4–11)

## 2018-08-11 PROCEDURE — 80053 COMPREHEN METABOLIC PANEL: CPT

## 2018-08-11 PROCEDURE — 85025 COMPLETE CBC W/AUTO DIFF WBC: CPT

## 2018-08-11 PROCEDURE — 36415 COLL VENOUS BLD VENIPUNCTURE: CPT

## 2018-08-11 PROCEDURE — 83615 LACTATE (LD) (LDH) ENZYME: CPT

## 2018-08-13 ENCOUNTER — OFFICE VISIT (OUTPATIENT)
Dept: HEMATOLOGY/ONCOLOGY | Facility: HOSPITAL | Age: 48
End: 2018-08-13
Attending: INTERNAL MEDICINE
Payer: COMMERCIAL

## 2018-08-13 VITALS
SYSTOLIC BLOOD PRESSURE: 119 MMHG | RESPIRATION RATE: 16 BRPM | TEMPERATURE: 98 F | WEIGHT: 261 LBS | HEART RATE: 83 BPM | HEIGHT: 66 IN | DIASTOLIC BLOOD PRESSURE: 74 MMHG | BODY MASS INDEX: 41.95 KG/M2

## 2018-08-13 DIAGNOSIS — Z85.72 ENCOUNTER FOR FOLLOW-UP SURVEILLANCE OF LYMPHOMA: ICD-10-CM

## 2018-08-13 DIAGNOSIS — Z08 ENCOUNTER FOR FOLLOW-UP SURVEILLANCE OF LYMPHOMA: ICD-10-CM

## 2018-08-13 DIAGNOSIS — C84.70 ANAPLASTIC ALK-NEGATIVE LARGE CELL LYMPHOMA, UNSPECIFIED BODY REGION (HCC): Primary | ICD-10-CM

## 2018-08-13 DIAGNOSIS — Z94.84 HISTORY OF STEM CELL TRANSPLANT (HCC): ICD-10-CM

## 2018-08-13 PROCEDURE — 99215 OFFICE O/P EST HI 40 MIN: CPT | Performed by: INTERNAL MEDICINE

## 2018-08-13 NOTE — PROGRESS NOTES
Name: Gradie Opitz  MRN: [id number]  YOB: 1970  CSN: [id number]  Date of Visit: 8/13/2018      Chief Complaint: ALCL; ALK negative      HPI:  Ms. Reese Wing is seen in the office for follow up regarding ALK negative ALCL.   Patient was treat swelling that resolves by morning with elevation of her legs since starting a new fitness routine in 7/2018   Gastrointestinal: Negative for abdominal distention, abdominal pain, blood in stool, constipation, diarrhea, nausea and vomiting.    Genitourinary: LIGATION    Social History  Social History   Marital status:   Spouse name: N/A    Years of education: N/A  Number of children: N/A     Occupational History  None on file     Social History Main Topics   Smoking status: Never Smoker    Smokeless tob no mass. There is no tenderness. Musculoskeletal: Normal range of motion. She exhibits no edema or tenderness. Lymphadenopathy:        Head (right side): No submental and no submandibular adenopathy present.         Head (left side): No submental and no acute osseous lesions. OTHER:             Periapical lucency involving the third posterior most right maxillary tooth. CT CHEST FINDINGS:  CARDIAC:         Normal size. No pericardial effusion. MEDIASTINUM/DUSTIN:    No mass.   No significant adenopa alk-negative large cell lymphoma, unspecified body region (hcc)  (primary encounter diagnosis)  History of stem cell transplant (hcc)  Encounter for follow-up surveillance of lymphoma     1.) NHL - ALK negative ALCL.     Patient was treated in the past with

## 2018-08-31 RX ORDER — LEVOTHYROXINE SODIUM 88 UG/1
88 TABLET ORAL
Qty: 90 TABLET | Refills: 0 | Status: SHIPPED | OUTPATIENT
Start: 2018-08-31 | End: 2019-07-01 | Stop reason: DRUGHIGH

## 2018-08-31 NOTE — TELEPHONE ENCOUNTER
LOV 4/20/18. RTC in 6 months. No upcoming appointment. Refill pended and routed to Dr. Fernando Goss.

## 2018-08-31 NOTE — TELEPHONE ENCOUNTER
From: Adrianna Hancock  Sent: 8/31/2018 4:45 PM CDT  Subject: Medication Renewal Request    Adrianna Hancock would like a refill of the following medications:     Levothyroxine Sodium 88 MCG Oral Tab Noman Peoples MD]    [facility]

## 2018-10-02 ENCOUNTER — OFFICE VISIT (OUTPATIENT)
Dept: NEPHROLOGY | Facility: CLINIC | Age: 48
End: 2018-10-02
Payer: COMMERCIAL

## 2018-10-02 VITALS
DIASTOLIC BLOOD PRESSURE: 79 MMHG | BODY MASS INDEX: 41.43 KG/M2 | SYSTOLIC BLOOD PRESSURE: 119 MMHG | WEIGHT: 257.81 LBS | HEIGHT: 66 IN | HEART RATE: 80 BPM

## 2018-10-02 DIAGNOSIS — G47.30 SLEEP APNEA, UNSPECIFIED TYPE: Primary | ICD-10-CM

## 2018-10-02 DIAGNOSIS — E78.00 HYPERCHOLESTEREMIA: ICD-10-CM

## 2018-10-02 DIAGNOSIS — E03.9 HYPOTHYROIDISM, UNSPECIFIED TYPE: ICD-10-CM

## 2018-10-02 PROCEDURE — 99214 OFFICE O/P EST MOD 30 MIN: CPT | Performed by: INTERNAL MEDICINE

## 2018-10-02 PROCEDURE — 99212 OFFICE O/P EST SF 10 MIN: CPT | Performed by: INTERNAL MEDICINE

## 2018-10-02 NOTE — PROGRESS NOTES
[facility], [facility]  Nephrology Daily Progress Note    Ni Barahona  [id number]  46year old  Patient presents with:  Medication Follow-Up      HPI:   Ni Barahona is a 46year old female.   57-year-old female with a history of allergic rhinitis, untrea VITALS: WEIGHT ONLY 4/20/2018 8/13/2018 10/2/2018   Weight 260 lbs 10 oz 261 lbs 257 lbs 13 oz       Constitutional: appears well hydrated alert and responsive no acute distress noted  Neck/Thyroid: neck is supple without adenopathy  Lymphatic: no ab Comment:Other reaction(s): GI problems         ASSESSMENT/PLAN:   Assessment   Sleep apnea, unspecified type  (primary encounter diagnosis)  Hypercholesteremia  Hypothyroidism, unspecified type    Patient Active Problem List:     Anaplastic

## 2018-10-29 ENCOUNTER — OFFICE VISIT (OUTPATIENT)
Dept: OBGYN CLINIC | Facility: CLINIC | Age: 48
End: 2018-10-29
Payer: COMMERCIAL

## 2018-10-29 VITALS
DIASTOLIC BLOOD PRESSURE: 77 MMHG | BODY MASS INDEX: 41 KG/M2 | SYSTOLIC BLOOD PRESSURE: 117 MMHG | WEIGHT: 253 LBS | HEART RATE: 88 BPM

## 2018-10-29 DIAGNOSIS — Z01.419 ENCOUNTER FOR GYNECOLOGICAL EXAMINATION: Primary | ICD-10-CM

## 2018-10-29 DIAGNOSIS — Z12.31 ENCOUNTER FOR SCREENING MAMMOGRAM FOR BREAST CANCER: ICD-10-CM

## 2018-10-29 DIAGNOSIS — Z12.4 SCREENING FOR MALIGNANT NEOPLASM OF CERVIX: ICD-10-CM

## 2018-10-29 PROCEDURE — 99396 PREV VISIT EST AGE 40-64: CPT | Performed by: OBSTETRICS & GYNECOLOGY

## 2018-10-31 NOTE — PROGRESS NOTES
Ross Sousa is a 46year old female  No LMP recorded. Patient is not currently having periods (Reason: Chemo). here for annual exam.       Last seen 17. Last pap 2015 normal with neg HPV.     NonHodgkin lymphoma in remission after jacy Sister    • Heart Disease Other         Grandparents   • Other (Other) Mother         Rheumatoid Arthritis   • Breast Cancer Sister 52   • Other (Other) Brother         Hemochromatosis   • Melanoma Maternal Grandfather        MEDICATIONS:    Current Outpat nipple discharge, nipple retraction or skin changes  Abdomen:  soft, nontender, nondistended, no masses  Psychiatric:  Oriented to time, place, person and situation.  Appropriate mood and affect    Pelvic Exam:  External Genitalia: normal appearance, hair d

## 2018-11-06 NOTE — H&P
[address] [facility] [address] Gastroenterology                                                                                                  Clinic History and Physical     Pa lymphoma, unspecified site, extranodal and solid organ sites 05/29/2014   • Hypercholesterolemia    • Hypothyroidism     Hypothyroidism, primary   • Other malignant lymphomas of intra-abdominal lymph nodes 05/15/2014   • Rheumatoid arthritis ([facility] [address]    • Swe daily.   Disp:  Rfl:    Diclofenac 5% in Liposome cream Apply 2 pump topically 2 (two) times daily.  Disp:  Rfl:        Allergies:    Aspirin                     Comment:Other reaction(s): GI problems    ROS:   CONSTITUTIONAL:  negative for fevers, rigors non-Hodgkin's lymphoma, who presents for colon cancer screening evaluation. 1. Screening Colonoscopy: No family hx of colon cancer and it is appropriate to proceed with screening colonoscopy.  Patient is currently asymptomatic and denies diarrhea, hema

## 2018-11-08 ENCOUNTER — TELEPHONE (OUTPATIENT)
Dept: NEPHROLOGY | Facility: CLINIC | Age: 48
End: 2018-11-08

## 2018-11-08 NOTE — TELEPHONE ENCOUNTER
Pt requesting recommendation for orthopedic doctor due to knee pain caused from a fall a few years ago. Pt also states she has scheduled sleep study and labs.  Please call thank you [phone](pt states it is ok to leave a message)

## 2018-11-08 NOTE — TELEPHONE ENCOUNTER
Patient contacted and relayed the names of the 2 doctors that were recommended by Dr. Jeanne Clinton. Office phone# was given to patient.

## 2018-11-10 ENCOUNTER — APPOINTMENT (OUTPATIENT)
Dept: LAB | Age: 48
End: 2018-11-10
Attending: INTERNAL MEDICINE
Payer: COMMERCIAL

## 2018-11-10 DIAGNOSIS — G47.30 SLEEP APNEA, UNSPECIFIED TYPE: ICD-10-CM

## 2018-11-10 DIAGNOSIS — E78.00 HYPERCHOLESTEREMIA: ICD-10-CM

## 2018-11-10 PROCEDURE — 81001 URINALYSIS AUTO W/SCOPE: CPT

## 2018-11-10 PROCEDURE — 36415 COLL VENOUS BLD VENIPUNCTURE: CPT

## 2018-11-10 PROCEDURE — 80061 LIPID PANEL: CPT

## 2018-11-12 ENCOUNTER — TELEPHONE (OUTPATIENT)
Dept: NEPHROLOGY | Facility: CLINIC | Age: 48
End: 2018-11-12

## 2018-11-12 DIAGNOSIS — E78.00 PURE HYPERCHOLESTEROLEMIA: Primary | ICD-10-CM

## 2018-11-12 NOTE — TELEPHONE ENCOUNTER
Tried calling patient 3 times. Call is being answered and then disconnected immediately. Can try again later.

## 2018-11-14 ENCOUNTER — OFFICE VISIT (OUTPATIENT)
Dept: ORTHOPEDICS CLINIC | Facility: CLINIC | Age: 48
End: 2018-11-14
Payer: COMMERCIAL

## 2018-11-14 ENCOUNTER — HOSPITAL ENCOUNTER (OUTPATIENT)
Dept: GENERAL RADIOLOGY | Facility: HOSPITAL | Age: 48
Discharge: HOME | End: 2018-11-14
Attending: ORTHOPAEDIC SURGERY
Payer: COMMERCIAL

## 2018-11-14 DIAGNOSIS — M25.561 RIGHT KNEE PAIN, UNSPECIFIED CHRONICITY: ICD-10-CM

## 2018-11-14 DIAGNOSIS — M25.561 RIGHT KNEE PAIN, UNSPECIFIED CHRONICITY: Primary | ICD-10-CM

## 2018-11-14 PROCEDURE — 73564 X-RAY EXAM KNEE 4 OR MORE: CPT | Performed by: ORTHOPAEDIC SURGERY

## 2018-11-14 PROCEDURE — 99243 OFF/OP CNSLTJ NEW/EST LOW 30: CPT | Performed by: ORTHOPAEDIC SURGERY

## 2018-11-14 PROCEDURE — 99212 OFFICE O/P EST SF 10 MIN: CPT | Performed by: ORTHOPAEDIC SURGERY

## 2018-11-14 RX ORDER — ROSUVASTATIN CALCIUM 20 MG/1
TABLET, COATED ORAL
Qty: 135 TABLET | Refills: 1 | COMMUNITY
Start: 2018-11-14 | End: 2018-12-26

## 2018-11-14 RX ORDER — MELOXICAM 15 MG/1
15 TABLET ORAL DAILY
Qty: 30 TABLET | Refills: 1 | Status: SHIPPED | OUTPATIENT
Start: 2018-11-14 | End: 2019-08-26 | Stop reason: ALTCHOICE

## 2018-11-14 NOTE — H&P
Chief Complaint: Right knee pain    NURSING INTAKE COMMENTS: Patient presents with:  Knee Pain: Right - onset 3 weeks ago that it flared up - she fell on the knee 2 years ago - has pain on the medial and anterior aspect of the knee rated as 2-4/10 with pre Social History    Tobacco Use      Smoking status: Never Smoker      Smokeless tobacco: Never Used    Alcohol use: No      Alcohol/week: 0.0 oz    Drug use: No        A comprehensive 10 point review of systems was completed and reviewed from the patient Rest, Ice, Elevation, and NSAID's if not contraindicated from a medical standpoint  MRI, follow up after

## 2018-11-14 NOTE — TELEPHONE ENCOUNTER
Patient contacted. Results message read to patient. She is aware to increase Crestor to 30 mg daily, watch her diet and she will do Lipid and Liver panels again in 6 weeks. Med list updated. Lab orders entered in Epic.

## 2018-11-16 ENCOUNTER — OFFICE VISIT (OUTPATIENT)
Dept: SLEEP CENTER | Age: 48
End: 2018-11-16
Attending: INTERNAL MEDICINE
Payer: COMMERCIAL

## 2018-11-16 DIAGNOSIS — G47.30 SLEEP APNEA, UNSPECIFIED TYPE: ICD-10-CM

## 2018-11-16 DIAGNOSIS — Z76.89 SLEEP CONCERN: Primary | ICD-10-CM

## 2018-11-16 PROCEDURE — 95810 POLYSOM 6/> YRS 4/> PARAM: CPT

## 2018-11-18 ENCOUNTER — TELEPHONE (OUTPATIENT)
Dept: NEPHROLOGY | Facility: CLINIC | Age: 48
End: 2018-11-18

## 2018-11-18 NOTE — TELEPHONE ENCOUNTER
Sleep study shows mild sleep apnea except during REM sleep when he becomes moderately severe. Refer to pulmonary to see if they can help her with her treatment.   Did not tolerate CPAP

## 2018-11-18 NOTE — PROCEDURES
[address] [facility]  Accredited by the [facility] ([facility])    PATIENT'S NAME: Doris Henry   ATTENDING PHYSICIAN: Saima Lyles MD   REFERRING PHYSICIAN: Saima Lyles MD   PATIENT ACCOUNT #: [id number] LOCATION: of 8.0 events per hour. There were no significant periodic limb movements, and the lowest desaturation was 81%. The average heart rate was 74 beats per minute, and there was no significant bradycardia, asystole, tachycardia, nor atrial fibrillation.   Sle

## 2018-11-19 ENCOUNTER — TELEPHONE (OUTPATIENT)
Dept: ORTHOPEDICS CLINIC | Facility: CLINIC | Age: 48
End: 2018-11-19

## 2018-11-19 NOTE — TELEPHONE ENCOUNTER
Patient returned call. Informed her of Dr. Rita Reed message below. Provided phone number for pulmonology. Pulmo RN's: can you assist patient in making a consult appt? THanks.

## 2018-11-19 NOTE — TELEPHONE ENCOUNTER
AIM called at [phone] and spoke to Ney Mckeon. Prior authorization for MRI right knee CPT=73721 did not meet requirements for authorization. Spoke to Machelle nurse reviewer to discuss case and still did not meet requirement for immediate authorization.  Per Aleisha Vaca

## 2018-11-20 NOTE — TELEPHONE ENCOUNTER
Per pt she made appt on 12/14 3 pm [facility] w/ Dr. Elinor Kasper. Confirmed nothing further is needed from our office.

## 2018-11-21 ENCOUNTER — OFFICE VISIT (OUTPATIENT)
Dept: GASTROENTEROLOGY | Facility: CLINIC | Age: 48
End: 2018-11-21
Payer: COMMERCIAL

## 2018-11-21 ENCOUNTER — TELEPHONE (OUTPATIENT)
Dept: GASTROENTEROLOGY | Facility: CLINIC | Age: 48
End: 2018-11-21

## 2018-11-21 VITALS
HEIGHT: 66 IN | SYSTOLIC BLOOD PRESSURE: 116 MMHG | HEART RATE: 71 BPM | WEIGHT: 253.63 LBS | DIASTOLIC BLOOD PRESSURE: 78 MMHG | BODY MASS INDEX: 40.76 KG/M2

## 2018-11-21 DIAGNOSIS — Z12.11 SCREEN FOR COLON CANCER: Primary | ICD-10-CM

## 2018-11-21 DIAGNOSIS — Z12.11 ENCOUNTER FOR SCREENING COLONOSCOPY: Primary | ICD-10-CM

## 2018-11-21 PROCEDURE — 99243 OFF/OP CNSLTJ NEW/EST LOW 30: CPT | Performed by: NURSE PRACTITIONER

## 2018-11-21 PROCEDURE — 99212 OFFICE O/P EST SF 10 MIN: CPT | Performed by: NURSE PRACTITIONER

## 2018-11-21 NOTE — PATIENT INSTRUCTIONS
1. Schedule colonoscopy with first available MD w/ MAC     2.  bowel prep from pharmacy - split dose Colyte    3. Continue all medications for procedure     4. Read all bowel prep instructions carefully    5.  AVOID seeds, nuts, popcorn, raw fruits a

## 2018-11-21 NOTE — TELEPHONE ENCOUNTER
Scheduled for:  Colonoscopy 88903  Provider Name: Dr. Yara Bragg  Date:  2/18/19  Location:  [facility]  Sedation:  MAC  Time:  7:30 am, arrival 6:30 am  Prep: Colyte  Meds/Allergies Reconciled?:  Mildred/APN reviewed.   Diagnosis with codes:  Screening Z12.11  Was patient

## 2018-11-21 NOTE — TELEPHONE ENCOUNTER
I called and s/w Gisell Byrd to check status- she states MRI approved VDCT#[id number] exp 12/18/18 at [address] [place] [address]. Called pt and informed her of MRI auth and exp date. Gave her phone # to CS to make appt and advised to f/u after.

## 2018-12-01 ENCOUNTER — HOSPITAL ENCOUNTER (OUTPATIENT)
Dept: MAMMOGRAPHY | Age: 48
Discharge: HOME | End: 2018-12-01
Attending: OBSTETRICS & GYNECOLOGY
Payer: COMMERCIAL

## 2018-12-01 ENCOUNTER — HOSPITAL ENCOUNTER (OUTPATIENT)
Dept: MRI IMAGING | Age: 48
Discharge: HOME | End: 2018-12-01
Attending: ORTHOPAEDIC SURGERY
Payer: COMMERCIAL

## 2018-12-01 DIAGNOSIS — M25.561 RIGHT KNEE PAIN, UNSPECIFIED CHRONICITY: ICD-10-CM

## 2018-12-01 DIAGNOSIS — Z12.31 ENCOUNTER FOR SCREENING MAMMOGRAM FOR BREAST CANCER: ICD-10-CM

## 2018-12-01 PROCEDURE — 73721 MRI JNT OF LWR EXTRE W/O DYE: CPT | Performed by: ORTHOPAEDIC SURGERY

## 2018-12-01 PROCEDURE — 77067 SCR MAMMO BI INCL CAD: CPT | Performed by: OBSTETRICS & GYNECOLOGY

## 2018-12-01 PROCEDURE — 77063 BREAST TOMOSYNTHESIS BI: CPT | Performed by: OBSTETRICS & GYNECOLOGY

## 2018-12-03 ENCOUNTER — OFFICE VISIT (OUTPATIENT)
Dept: ORTHOPEDICS CLINIC | Facility: CLINIC | Age: 48
End: 2018-12-03
Payer: COMMERCIAL

## 2018-12-03 VITALS — RESPIRATION RATE: 16 BRPM | HEART RATE: 87 BPM | DIASTOLIC BLOOD PRESSURE: 78 MMHG | SYSTOLIC BLOOD PRESSURE: 112 MMHG

## 2018-12-03 DIAGNOSIS — M25.561 RIGHT KNEE PAIN, UNSPECIFIED CHRONICITY: Primary | ICD-10-CM

## 2018-12-03 DIAGNOSIS — M17.11 PRIMARY LOCALIZED OSTEOARTHROSIS OF RIGHT LOWER LEG: ICD-10-CM

## 2018-12-03 PROCEDURE — 20610 DRAIN/INJ JOINT/BURSA W/O US: CPT | Performed by: ORTHOPAEDIC SURGERY

## 2018-12-03 NOTE — PROGRESS NOTES
Patient presents for follow-up on her MRI. It does not show any major acute abnormalities. I recommended a cortisone injection and continued use of meloxicam.  Overall, the MRI shows slight progression of osteoarthritic changes as well as synovitis.

## 2018-12-03 NOTE — PROGRESS NOTES
Per verbal order from VT, draw up 3ml of Kenalog 10 and 3ml of 1% lidocaine for cortisone injection to right knee. Ptgiven steroid information sheet. Pre injection vs stable.  Consent signed by Parminder Matthews, myself and Dr. Angel Guerrero for right knee steorid injec

## 2018-12-14 ENCOUNTER — OFFICE VISIT (OUTPATIENT)
Dept: PULMONOLOGY | Facility: CLINIC | Age: 48
End: 2018-12-14
Payer: COMMERCIAL

## 2018-12-14 VITALS
RESPIRATION RATE: 16 BRPM | HEIGHT: 65 IN | OXYGEN SATURATION: 98 % | BODY MASS INDEX: 42.1 KG/M2 | SYSTOLIC BLOOD PRESSURE: 123 MMHG | DIASTOLIC BLOOD PRESSURE: 79 MMHG | WEIGHT: 252.69 LBS | HEART RATE: 80 BPM

## 2018-12-14 DIAGNOSIS — G47.33 OSA (OBSTRUCTIVE SLEEP APNEA): Primary | ICD-10-CM

## 2018-12-14 PROCEDURE — 99244 OFF/OP CNSLTJ NEW/EST MOD 40: CPT | Performed by: INTERNAL MEDICINE

## 2018-12-14 PROCEDURE — 99212 OFFICE O/P EST SF 10 MIN: CPT | Performed by: INTERNAL MEDICINE

## 2018-12-14 NOTE — PROGRESS NOTES
[address], [place]    Report of Consultation    Mae Ballesteros Patient Status:  Outpatient    3/22/1970 MRN [id number]   [facility] [address] Attending No att. providers found   Hosp Day # 0 PCP Mel Sandoval • Melanoma Maternal Grandfather    • Breast Cancer Paternal Aunt 72       Social History  Social History    Tobacco Use      Smoking status: Never Smoker      Smokeless tobacco: Never Used    Alcohol use: No      Alcohol/week: 0.0 oz      Comment:  Occ doesn't tolerate will send for oral appliances   Pt was educated about sleep apnea       F/u in 2-3 months                        Thank you for allowing me to participate in the care of your patient. Loreta Sanchez  12/14/2018

## 2018-12-27 RX ORDER — ROSUVASTATIN CALCIUM 20 MG/1
TABLET, COATED ORAL
Qty: 135 TABLET | Refills: 1 | Status: SHIPPED | OUTPATIENT
Start: 2018-12-27 | End: 2019-01-21

## 2018-12-27 NOTE — TELEPHONE ENCOUNTER
LOV 10/2/18. RTC 6 months. Crestor was increased to 30 mg daily in TE 11/12/18. Rx refilled per [facility] refill protocol in his absence.

## 2019-01-04 ENCOUNTER — OFFICE VISIT (OUTPATIENT)
Dept: SLEEP CENTER | Age: 49
End: 2019-01-04
Attending: INTERNAL MEDICINE
Payer: COMMERCIAL

## 2019-01-04 ENCOUNTER — ORDER TRANSCRIPTION (OUTPATIENT)
Dept: SLEEP CENTER | Age: 49
End: 2019-01-04

## 2019-01-04 DIAGNOSIS — Z76.89 SLEEP CONCERN: Primary | ICD-10-CM

## 2019-01-04 DIAGNOSIS — G47.33 OSA (OBSTRUCTIVE SLEEP APNEA): Primary | ICD-10-CM

## 2019-01-04 PROCEDURE — 95811 POLYSOM 6/>YRS CPAP 4/> PARM: CPT

## 2019-01-07 NOTE — PROCEDURES
[facility]  Accredited by the [employer] (AASM)    PATIENT'S NAME: Elisa Oliver   ATTENDING PHYSICIAN: Tawana Raymundo. Jenny Awad MD   REFERRING PHYSICIAN: Tawana Raymundo.  Jenny Awad MD   PATIENT ACCOUNT #: [de-identified] LOCATION: Sle index of 15.3 events per hour.   There were 73 periodic limb movements, for a periodic limb movement index of 12.8 events per hour, of which 4.6 per hour were associated with arousal.  Lowest desaturation was to 90%, the average heart rate is 84 beats per m

## 2019-01-12 ENCOUNTER — APPOINTMENT (OUTPATIENT)
Dept: LAB | Age: 49
End: 2019-01-12
Attending: INTERNAL MEDICINE
Payer: COMMERCIAL

## 2019-01-12 DIAGNOSIS — E78.00 PURE HYPERCHOLESTEROLEMIA: ICD-10-CM

## 2019-01-12 LAB
ALBUMIN SERPL BCP-MCNC: 3.6 G/DL (ref 3.5–4.8)
ALP SERPL-CCNC: 73 U/L (ref 32–100)
ALT SERPL-CCNC: 23 U/L (ref 14–54)
AST SERPL-CCNC: 20 U/L (ref 15–41)
BILIRUB DIRECT SERPL-MCNC: 0.1 MG/DL (ref 0–0.2)
BILIRUB SERPL-MCNC: 0.4 MG/DL (ref 0.3–1.2)
CHOLEST SERPL-MCNC: 151 MG/DL (ref 110–200)
HDLC SERPL-MCNC: 61 MG/DL
LDLC SERPL CALC-MCNC: 77 MG/DL (ref 0–99)
NONHDLC SERPL-MCNC: 90 MG/DL
PROT SERPL-MCNC: 6.5 G/DL (ref 5.9–8.4)
TRIGL SERPL-MCNC: 63 MG/DL (ref 1–149)

## 2019-01-12 PROCEDURE — 36415 COLL VENOUS BLD VENIPUNCTURE: CPT

## 2019-01-12 PROCEDURE — 80076 HEPATIC FUNCTION PANEL: CPT

## 2019-01-12 PROCEDURE — 80061 LIPID PANEL: CPT

## 2019-01-16 ENCOUNTER — TELEPHONE (OUTPATIENT)
Dept: PULMONOLOGY | Facility: CLINIC | Age: 49
End: 2019-01-16

## 2019-01-16 DIAGNOSIS — G47.33 OSA (OBSTRUCTIVE SLEEP APNEA): Primary | ICD-10-CM

## 2019-01-16 NOTE — TELEPHONE ENCOUNTER
----- Message from Yovani Rees MD sent at 1/14/2019  2:28 PM CST -----  Please order CPAP at [facility] as directed and recent CPAP titration sleep study  Download data 6 weeks and then follow-up with me

## 2019-01-16 NOTE — TELEPHONE ENCOUNTER
Pt informed of being set-up with Cpap which she voiced understanding. Pt informed of DME company [employer] and given telephone number [phone]. Pt also informed once start date to f/u with [facility] 30-90 days.    Order,Facesheet,LOV Notes, Diagnostic, & Titration p

## 2019-01-21 ENCOUNTER — PATIENT MESSAGE (OUTPATIENT)
Dept: NEPHROLOGY | Facility: CLINIC | Age: 49
End: 2019-01-21

## 2019-01-21 RX ORDER — ROSUVASTATIN CALCIUM 20 MG/1
TABLET, COATED ORAL
Qty: 135 TABLET | Refills: 0 | Status: SHIPPED | OUTPATIENT
Start: 2019-01-21 | End: 2019-07-01

## 2019-01-21 NOTE — TELEPHONE ENCOUNTER
From: Tabatha Miranda  To: Pricilla Roberts MD  Sent: 1/21/2019 8:41 AM CST  Subject: Prescription Question    I need to have my Crestor prescription rewritten.  I have filled it for 20 mg but now that I am taking one and half it will net last the full mon

## 2019-01-23 ENCOUNTER — TELEPHONE (OUTPATIENT)
Dept: ORTHOPEDICS CLINIC | Facility: CLINIC | Age: 49
End: 2019-01-23

## 2019-01-23 NOTE — TELEPHONE ENCOUNTER
rc'd fax request for Meloxicam 15mg #30  Called pt and she states she did not request this medication and does not need refill. Will disregard.

## 2019-02-09 ENCOUNTER — LAB ENCOUNTER (OUTPATIENT)
Dept: LAB | Age: 49
End: 2019-02-09
Attending: INTERNAL MEDICINE
Payer: COMMERCIAL

## 2019-02-09 DIAGNOSIS — Z85.72 ENCOUNTER FOR FOLLOW-UP SURVEILLANCE OF LYMPHOMA: ICD-10-CM

## 2019-02-09 DIAGNOSIS — C84.70 ANAPLASTIC ALK-NEGATIVE LARGE CELL LYMPHOMA, UNSPECIFIED BODY REGION (HCC): ICD-10-CM

## 2019-02-09 DIAGNOSIS — Z94.84 HISTORY OF STEM CELL TRANSPLANT (HCC): ICD-10-CM

## 2019-02-09 DIAGNOSIS — Z08 ENCOUNTER FOR FOLLOW-UP SURVEILLANCE OF LYMPHOMA: ICD-10-CM

## 2019-02-09 LAB
ALBUMIN SERPL BCP-MCNC: 3.7 G/DL (ref 3.5–4.8)
ALBUMIN/GLOB SERPL: 1.3 {RATIO} (ref 1–2)
ALP SERPL-CCNC: 70 U/L (ref 32–100)
ALT SERPL-CCNC: 20 U/L (ref 14–54)
ANION GAP SERPL CALC-SCNC: 9 MMOL/L (ref 0–18)
AST SERPL-CCNC: 19 U/L (ref 15–41)
BASOPHILS # BLD AUTO: 0.05 X10(3) UL (ref 0–0.2)
BASOPHILS NFR BLD AUTO: 0.9 %
BILIRUB SERPL-MCNC: 0.3 MG/DL (ref 0.3–1.2)
BUN SERPL-MCNC: 13 MG/DL (ref 8–20)
BUN/CREAT SERPL: 18.3 (ref 10–20)
CALCIUM SERPL-MCNC: 9.2 MG/DL (ref 8.5–10.5)
CHLORIDE SERPL-SCNC: 108 MMOL/L (ref 95–110)
CO2 SERPL-SCNC: 26 MMOL/L (ref 22–32)
CREAT SERPL-MCNC: 0.71 MG/DL (ref 0.5–1.5)
DEPRECATED RDW RBC AUTO: 47.3 FL (ref 35.1–46.3)
EOSINOPHIL # BLD AUTO: 0.15 X10(3) UL (ref 0–0.7)
EOSINOPHIL NFR BLD AUTO: 2.7 %
ERYTHROCYTE [DISTWIDTH] IN BLOOD BY AUTOMATED COUNT: 14.4 % (ref 11–15)
GLOBULIN PLAS-MCNC: 2.8 G/DL (ref 2.5–3.7)
GLUCOSE SERPL-MCNC: 96 MG/DL (ref 70–99)
HCT VFR BLD AUTO: 45.4 % (ref 35–48)
HGB BLD-MCNC: 14.6 G/DL (ref 12–16)
IMM GRANULOCYTES # BLD AUTO: 0.01 X10(3) UL (ref 0–1)
IMM GRANULOCYTES NFR BLD: 0.2 %
LDH SERPL L TO P-CCNC: 138 U/L (ref 98–192)
LYMPHOCYTES # BLD AUTO: 2.11 X10(3) UL (ref 1–4)
LYMPHOCYTES NFR BLD AUTO: 38.2 %
MCH RBC QN AUTO: 28.9 PG (ref 26–34)
MCHC RBC AUTO-ENTMCNC: 32.2 G/DL (ref 31–37)
MCV RBC AUTO: 89.7 FL (ref 80–100)
MONOCYTES # BLD AUTO: 0.48 X10(3) UL (ref 0.1–1)
MONOCYTES NFR BLD AUTO: 8.7 %
NEUTROPHILS # BLD AUTO: 2.73 X10 (3) UL (ref 1.5–7.7)
NEUTROPHILS # BLD AUTO: 2.73 X10(3) UL (ref 1.5–7.7)
NEUTROPHILS NFR BLD AUTO: 49.3 %
OSMOLALITY UR CALC.SUM OF ELEC: 296 MOSM/KG (ref 275–295)
PATIENT FASTING: YES
PLATELET # BLD AUTO: 243 10(3)UL (ref 150–450)
POTASSIUM SERPL-SCNC: 4.2 MMOL/L (ref 3.3–5.1)
PROT SERPL-MCNC: 6.5 G/DL (ref 5.9–8.4)
RBC # BLD AUTO: 5.06 X10(6)UL (ref 3.8–5.3)
SODIUM SERPL-SCNC: 143 MMOL/L (ref 136–144)
WBC # BLD AUTO: 5.5 X10(3) UL (ref 4–11)

## 2019-02-09 PROCEDURE — 80053 COMPREHEN METABOLIC PANEL: CPT

## 2019-02-09 PROCEDURE — 83615 LACTATE (LD) (LDH) ENZYME: CPT

## 2019-02-09 PROCEDURE — 85025 COMPLETE CBC W/AUTO DIFF WBC: CPT

## 2019-02-09 PROCEDURE — 36415 COLL VENOUS BLD VENIPUNCTURE: CPT

## 2019-02-11 ENCOUNTER — OFFICE VISIT (OUTPATIENT)
Dept: HEMATOLOGY/ONCOLOGY | Facility: HOSPITAL | Age: 49
End: 2019-02-11
Attending: INTERNAL MEDICINE
Payer: COMMERCIAL

## 2019-02-11 VITALS
DIASTOLIC BLOOD PRESSURE: 83 MMHG | HEART RATE: 83 BPM | HEIGHT: 66 IN | TEMPERATURE: 98 F | SYSTOLIC BLOOD PRESSURE: 116 MMHG | BODY MASS INDEX: 40.82 KG/M2 | WEIGHT: 254 LBS | RESPIRATION RATE: 18 BRPM | OXYGEN SATURATION: 99 %

## 2019-02-11 DIAGNOSIS — Z94.84 HISTORY OF STEM CELL TRANSPLANT (HCC): ICD-10-CM

## 2019-02-11 DIAGNOSIS — Z08 ENCOUNTER FOR FOLLOW-UP SURVEILLANCE OF LYMPHOMA: ICD-10-CM

## 2019-02-11 DIAGNOSIS — C84.70 ANAPLASTIC ALK-NEGATIVE LARGE CELL LYMPHOMA, UNSPECIFIED BODY REGION (HCC): Primary | ICD-10-CM

## 2019-02-11 DIAGNOSIS — Z85.72 ENCOUNTER FOR FOLLOW-UP SURVEILLANCE OF LYMPHOMA: ICD-10-CM

## 2019-02-11 PROCEDURE — 99215 OFFICE O/P EST HI 40 MIN: CPT | Performed by: INTERNAL MEDICINE

## 2019-02-11 NOTE — PROGRESS NOTES
Name: Gee Fair  MRN: [id number]  YOB: 1970  CSN: [id number]  Date of Visit: 2/11/2019      Chief Complaint: ALCL; ALK negative      HPI:  Ms. Skinner Sofiya is seen in the office for follow up regarding ALK negative ALCL.   Patient was treat diarrhea, nausea and vomiting. Genitourinary: Negative for dysuria, flank pain, hematuria and urgency. Musculoskeletal: Negative for arthralgias, back pain, gait problem, joint swelling, neck pain and neck stiffness.    Skin: Negative for pallor and edwige Umbilical hernia repair   • FUAD BIOPSY STEREOTACTIC NODULE 2 SITE BILAT  08/19/2015   • OTHER SURGICAL HISTORY      Injection Tendon Sheath, Ligament   • TUBAL LIGATION  2001     Social History    Socioeconomic History      Marital status:       Spo disease Sister    • Other (lupus) Sister    • Other (sjogren's) Sister    • Heart Disease Other         Grandparents   • Other (Other) Mother         Rheumatoid Arthritis   • Breast Cancer Sister 52   • Other (Other) Brother         Hemochromatosis   • Yeni noted. No erythema. Psychiatric: She has a normal mood and affect.  Her behavior is normal. Judgment normal.     Recent Labs   Lab  02/09/19   0741   RBC  5.06   HGB  14.6   HCT  45.4   MCV  89.7   MCH  28.9   MCHC  32.2   RDW  14.4   NEPRELIM  2.73   WBC office if with new complaints including B symptoms. I spent 30 minutes face to face with the patient. More than 50% of that time was spent counseling the patient and/or on coordination of care.   The diagnosis, prognosis, and general treatment was exp

## 2019-02-18 ENCOUNTER — HOSPITAL ENCOUNTER (OUTPATIENT)
Facility: HOSPITAL | Age: 49
Setting detail: HOSPITAL OUTPATIENT SURGERY
Discharge: HOME | End: 2019-02-18
Attending: INTERNAL MEDICINE | Admitting: INTERNAL MEDICINE
Payer: COMMERCIAL

## 2019-02-18 ENCOUNTER — ANESTHESIA (OUTPATIENT)
Dept: ENDOSCOPY | Facility: HOSPITAL | Age: 49
End: 2019-02-18
Payer: COMMERCIAL

## 2019-02-18 ENCOUNTER — TELEPHONE (OUTPATIENT)
Dept: GASTROENTEROLOGY | Facility: CLINIC | Age: 49
End: 2019-02-18

## 2019-02-18 ENCOUNTER — ANESTHESIA EVENT (OUTPATIENT)
Dept: ENDOSCOPY | Facility: HOSPITAL | Age: 49
End: 2019-02-18
Payer: COMMERCIAL

## 2019-02-18 VITALS
SYSTOLIC BLOOD PRESSURE: 107 MMHG | WEIGHT: 245 LBS | RESPIRATION RATE: 15 BRPM | BODY MASS INDEX: 39.37 KG/M2 | HEIGHT: 66 IN | HEART RATE: 79 BPM | OXYGEN SATURATION: 100 % | DIASTOLIC BLOOD PRESSURE: 81 MMHG

## 2019-02-18 DIAGNOSIS — Z12.11 SCREEN FOR COLON CANCER: ICD-10-CM

## 2019-02-18 PROCEDURE — 0DJD8ZZ INSPECTION OF LOWER INTESTINAL TRACT, VIA NATURAL OR ARTIFICIAL OPENING ENDOSCOPIC: ICD-10-PCS | Performed by: INTERNAL MEDICINE

## 2019-02-18 PROCEDURE — 45378 DIAGNOSTIC COLONOSCOPY: CPT | Performed by: INTERNAL MEDICINE

## 2019-02-18 RX ORDER — NALOXONE HYDROCHLORIDE 0.4 MG/ML
80 INJECTION, SOLUTION INTRAMUSCULAR; INTRAVENOUS; SUBCUTANEOUS AS NEEDED
Status: DISCONTINUED | OUTPATIENT
Start: 2019-02-18 | End: 2019-02-18

## 2019-02-18 RX ORDER — SODIUM CHLORIDE, SODIUM LACTATE, POTASSIUM CHLORIDE, CALCIUM CHLORIDE 600; 310; 30; 20 MG/100ML; MG/100ML; MG/100ML; MG/100ML
INJECTION, SOLUTION INTRAVENOUS CONTINUOUS
Status: DISCONTINUED | OUTPATIENT
Start: 2019-02-18 | End: 2019-02-18

## 2019-02-18 RX ADMIN — SODIUM CHLORIDE, SODIUM LACTATE, POTASSIUM CHLORIDE, CALCIUM CHLORIDE: 600; 310; 30; 20 INJECTION, SOLUTION INTRAVENOUS at 07:55:00

## 2019-02-18 NOTE — ANESTHESIA PREPROCEDURE EVALUATION
Anesthesia PreOp Note    HPI:     Isabella Sauceda is a 46year old female who presents for preoperative consultation requested by: Bailee Jarquin MD    Date of Surgery: 2/18/2019    Procedure(s):  COLONOSCOPY  Indication: Screen for colon cancer Injection Tendon Sheath, Ligament   • TUBAL LIGATION  2001         Medications Prior to Admission:  Rosuvastatin Calcium 20 MG Oral Tab Take 1.5 tablets (30 mg) orally every evening.  Disp: 135 tablet Rfl: 0 2/17/2019   Levothyroxine Sodium 88 MCG Oral Tab Social Needs      Financial resource strain: Not on file      Food insecurity:        Worry: Not on file        Inability: Not on file      Transportation needs:        Medical: Not on file        Non-medical: Not on file    Tobacco Use      Smoking stat Social History Narrative      Not on file      Available pre-op labs reviewed.   Lab Results   Component Value Date    WBC 5.5 02/09/2019    RBC 5.06 02/09/2019    HGB 14.6 02/09/2019    HCT 45.4 02/09/2019    MCV 89.7 02/09/2019    MCH 28.9 02/09/2019

## 2019-02-18 NOTE — OPERATIVE REPORT
[facility] Endoscopy Report      Date of Procedure:  02/18/19      Preoperative Diagnosis:  Colorectal cancer screening      Postoperative Diagnosis:  Diverticulosis left colon      Procedure:    Screening colonoscopy      Surgeon:  Romeo Amato MD  2/18/2019

## 2019-02-18 NOTE — ANESTHESIA POSTPROCEDURE EVALUATION
Patient: Ale Tucker    Procedure Summary     Date:  02/18/19 Room / Location:  [address] [place] [address] ENDOSCOPY 01 / [address] [place] [address] ENDOSCOPY    Anesthesia Start:  4034 Anesthesia Stop:      Procedure:  COLONOSCOPY (N/A ) Diagnosis:       Screen for colon cancer      (Elizabeth Vanessa

## 2019-02-18 NOTE — TELEPHONE ENCOUNTER
GI RN staff: Please enter in health maintenance that a colonoscopy was performed and enter colonoscopy recall for 10 years.

## 2019-02-18 NOTE — H&P
History & Physical Examination    Patient Name: Lucina Olvera  MRN: [id number]  [facility]: [id number]  YOB: 1970    Diagnosis: Colorectal cancer screening        Medications Prior to Admission:  Rosuvastatin Calcium 20 MG Oral Tab Take 1.5 table BIOPSY STEREOTACTIC NODULE 2 SITE BILAT  08/19/2015   • OTHER SURGICAL HISTORY      Injection Tendon Sheath, Ligament   • TUBAL LIGATION  2001     Family History   Problem Relation Age of Onset   • Diabetes Father    • Heart Disease Father    • Other (Othe

## 2019-02-18 NOTE — TELEPHONE ENCOUNTER
Entered into Epic:Recall colon in 10 years per Dr. Tiesha Alanis. Last Colon done 2/18/19, next due 2/18/29. Snapshot updated.

## 2019-05-03 ENCOUNTER — OFFICE VISIT (OUTPATIENT)
Dept: PULMONOLOGY | Facility: CLINIC | Age: 49
End: 2019-05-03
Payer: COMMERCIAL

## 2019-05-03 VITALS
BODY MASS INDEX: 40.18 KG/M2 | SYSTOLIC BLOOD PRESSURE: 115 MMHG | HEART RATE: 76 BPM | WEIGHT: 250 LBS | DIASTOLIC BLOOD PRESSURE: 76 MMHG | HEIGHT: 66 IN | OXYGEN SATURATION: 98 %

## 2019-05-03 DIAGNOSIS — Z99.89 OSA ON CPAP: Primary | ICD-10-CM

## 2019-05-03 DIAGNOSIS — G47.33 OSA ON CPAP: Primary | ICD-10-CM

## 2019-05-03 PROCEDURE — 99212 OFFICE O/P EST SF 10 MIN: CPT | Performed by: INTERNAL MEDICINE

## 2019-05-03 PROCEDURE — 99213 OFFICE O/P EST LOW 20 MIN: CPT | Performed by: INTERNAL MEDICINE

## 2019-05-03 NOTE — PROGRESS NOTES
HPI:    Patient ID: Jenny Sladivar is a 44year old female.     HPI  Doing well on cpap   No technical issue or nasal congestion but she takes it in few hours without noticing   Feels better when she uses it with less daytime sleepiness or fatigue   Stephan alcohol   Avoid driving if sleepy           F/u in one year                    Meds This Visit:  Requested Prescriptions      No prescriptions requested or ordered in this encounter       Imaging & Referrals:  None       KELI#[id number]

## 2019-05-20 DIAGNOSIS — E78.00 PURE HYPERCHOLESTEROLEMIA: ICD-10-CM

## 2019-05-20 DIAGNOSIS — E03.9 ACQUIRED HYPOTHYROIDISM: Primary | ICD-10-CM

## 2019-05-21 RX ORDER — LEVOTHYROXINE SODIUM 88 UG/1
TABLET ORAL
Qty: 30 TABLET | Refills: 0 | Status: SHIPPED | OUTPATIENT
Start: 2019-05-21 | End: 2019-07-01 | Stop reason: DRUGHIGH

## 2019-06-24 NOTE — TELEPHONE ENCOUNTER
Patient has been having a warm blotchy rash on face for 6 days,. She had it before she was sick. She believes it was part of non hodgkins lymphoma.    She is best to reach until 10,  Then from 11- 11:30 then again after 2:30 - [id number] No

## 2019-06-29 ENCOUNTER — APPOINTMENT (OUTPATIENT)
Dept: LAB | Age: 49
End: 2019-06-29
Attending: INTERNAL MEDICINE
Payer: COMMERCIAL

## 2019-06-29 DIAGNOSIS — E78.00 PURE HYPERCHOLESTEROLEMIA: ICD-10-CM

## 2019-06-29 DIAGNOSIS — E03.9 ACQUIRED HYPOTHYROIDISM: ICD-10-CM

## 2019-06-29 LAB
BILIRUB UR QL: NEGATIVE
CHOLEST SMN-MCNC: 221 MG/DL (ref ?–200)
CLARITY UR: CLEAR
COLOR UR: YELLOW
GLUCOSE UR-MCNC: NEGATIVE MG/DL
HDLC SERPL-MCNC: 61 MG/DL (ref 40–59)
HGB UR QL STRIP.AUTO: NEGATIVE
KETONES UR-MCNC: NEGATIVE MG/DL
LDLC SERPL CALC-MCNC: 146 MG/DL (ref ?–100)
NITRITE UR QL STRIP.AUTO: NEGATIVE
NONHDLC SERPL-MCNC: 160 MG/DL (ref ?–130)
PATIENT FASTING: YES
PH UR: 5 [PH] (ref 5–8)
PROT UR-MCNC: NEGATIVE MG/DL
RBC #/AREA URNS AUTO: 3 /HPF
SP GR UR STRIP: 1.02 (ref 1–1.03)
TRIGL SERPL-MCNC: 71 MG/DL (ref 30–149)
TSI SER-ACNC: 5.27 MIU/ML (ref 0.36–3.74)
UROBILINOGEN UR STRIP-ACNC: <2
VIT C UR-MCNC: NEGATIVE MG/DL
VLDLC SERPL CALC-MCNC: 14 MG/DL (ref 0–30)
WBC #/AREA URNS AUTO: 26 /HPF

## 2019-06-29 PROCEDURE — 81001 URINALYSIS AUTO W/SCOPE: CPT

## 2019-06-29 PROCEDURE — 80061 LIPID PANEL: CPT

## 2019-06-29 PROCEDURE — 36415 COLL VENOUS BLD VENIPUNCTURE: CPT

## 2019-06-29 PROCEDURE — 84443 ASSAY THYROID STIM HORMONE: CPT

## 2019-06-30 ENCOUNTER — TELEPHONE (OUTPATIENT)
Dept: NEPHROLOGY | Facility: CLINIC | Age: 49
End: 2019-06-30

## 2019-06-30 DIAGNOSIS — R82.90 ABNORMAL URINALYSIS: ICD-10-CM

## 2019-06-30 DIAGNOSIS — R30.0 DYSURIA: ICD-10-CM

## 2019-06-30 DIAGNOSIS — E78.00 PURE HYPERCHOLESTEROLEMIA: ICD-10-CM

## 2019-06-30 DIAGNOSIS — E03.9 ACQUIRED HYPOTHYROIDISM: Primary | ICD-10-CM

## 2019-06-30 NOTE — TELEPHONE ENCOUNTER
Cholesterol was much worse. Is she taking Crestor as prescribed? Rosa Tay Also question of UTI. Any symptoms? ?.  Thyroid is slightly underactive. If on 88 mcg of Synthroid increased to 100 mcg, 1 daily, #30, refills 5.   Follow-up as scheduled

## 2019-07-01 ENCOUNTER — APPOINTMENT (OUTPATIENT)
Dept: LAB | Age: 49
End: 2019-07-01
Attending: INTERNAL MEDICINE
Payer: COMMERCIAL

## 2019-07-01 DIAGNOSIS — R82.90 ABNORMAL URINALYSIS: ICD-10-CM

## 2019-07-01 DIAGNOSIS — R30.0 DYSURIA: ICD-10-CM

## 2019-07-01 PROCEDURE — 87086 URINE CULTURE/COLONY COUNT: CPT

## 2019-07-01 RX ORDER — LEVOTHYROXINE SODIUM 0.1 MG/1
100 TABLET ORAL
Qty: 30 TABLET | Refills: 0 | Status: SHIPPED | OUTPATIENT
Start: 2019-07-01 | End: 2019-07-31

## 2019-07-01 RX ORDER — CIPROFLOXACIN 250 MG/1
250 TABLET, FILM COATED ORAL 2 TIMES DAILY
Qty: 10 TABLET | Refills: 0 | Status: SHIPPED | OUTPATIENT
Start: 2019-07-01 | End: 2019-07-06

## 2019-07-01 RX ORDER — ROSUVASTATIN CALCIUM 40 MG/1
40 TABLET, COATED ORAL NIGHTLY
Qty: 30 TABLET | Refills: 5 | Status: SHIPPED | OUTPATIENT
Start: 2019-07-01 | End: 2020-02-10

## 2019-07-01 NOTE — TELEPHONE ENCOUNTER
Do Urine C&S and then start cipro 250 mg bid x 5d. Increase Crestor to 40 mg qd and repeat liver lipid panel and TSH in 6 wks.

## 2019-07-01 NOTE — TELEPHONE ENCOUNTER
Pt returned call. Notified pt to to urine culture and then start cipro, increase Crestor to 40 mg daily, and repeat labs in 6 weeks. Pt stated understanding. Rxs sent to [facility]. Orders for labs entered. Pt will follow up with CHEL on 7/3/19.

## 2019-07-01 NOTE — TELEPHONE ENCOUNTER
Pt notified. States she has been compliant with crestor. She is having mild urinary symptoms, reports a fever last week although she did not take her temp. Some burning with urination. Confirmed synthroid dose of 88mcg.   New rx sent to pharmacy per wri

## 2019-07-03 ENCOUNTER — OFFICE VISIT (OUTPATIENT)
Dept: NEPHROLOGY | Facility: CLINIC | Age: 49
End: 2019-07-03
Payer: COMMERCIAL

## 2019-07-03 VITALS
SYSTOLIC BLOOD PRESSURE: 109 MMHG | HEART RATE: 80 BPM | BODY MASS INDEX: 41.2 KG/M2 | DIASTOLIC BLOOD PRESSURE: 69 MMHG | HEIGHT: 66 IN | WEIGHT: 256.38 LBS

## 2019-07-03 DIAGNOSIS — E78.00 PURE HYPERCHOLESTEROLEMIA: Primary | ICD-10-CM

## 2019-07-03 DIAGNOSIS — G47.30 SLEEP APNEA, UNSPECIFIED TYPE: ICD-10-CM

## 2019-07-03 DIAGNOSIS — E03.9 ACQUIRED HYPOTHYROIDISM: ICD-10-CM

## 2019-07-03 PROCEDURE — 99214 OFFICE O/P EST MOD 30 MIN: CPT | Performed by: INTERNAL MEDICINE

## 2019-07-03 NOTE — PROGRESS NOTES
[address]  Nephrology Daily Progress Note    Fitz Ang  [id number]  44year old  Patient presents with:  Test Results      HPI:   Fitz Ang is a 44year old female.   68-year-old female with a history of allergic rhinitis, now treated sl 256 lbs 6 oz   Height - 66.000 66.000   BODY MASS INDEX 40.35 - 41.38       VITALS: WEIGHT ONLY 2/16/2019 5/3/2019 7/3/2019   Weight 245 lbs 250 lbs 256 lbs 6 oz       Constitutional: appears well hydrated alert and responsive no acute distress noted  Neck mouth daily. , Disp: , Rfl:   •  Meloxicam 15 MG Oral Tab, Take 1 tablet (15 mg total) by mouth daily. , Disp: 30 tablet, Rfl: 1  •  Diclofenac 5% in Liposome cream, Apply 2 pump topically 2 (two) times daily. , Disp: , Rfl:     Allergies:    Aspirin

## 2019-07-18 ENCOUNTER — LAB ENCOUNTER (OUTPATIENT)
Dept: LAB | Age: 49
End: 2019-07-18
Attending: OPHTHALMOLOGY
Payer: COMMERCIAL

## 2019-07-18 DIAGNOSIS — H04.129 DRY EYE SYNDROME: Primary | ICD-10-CM

## 2019-07-18 LAB
BASOPHILS # BLD AUTO: 0.05 X10(3) UL (ref 0–0.2)
BASOPHILS NFR BLD AUTO: 0.7 %
CRP SERPL-MCNC: <0.29 MG/DL (ref ?–0.3)
DEPRECATED RDW RBC AUTO: 44.3 FL (ref 35.1–46.3)
EOSINOPHIL # BLD AUTO: 0.13 X10(3) UL (ref 0–0.7)
EOSINOPHIL NFR BLD AUTO: 1.9 %
ERYTHROCYTE [DISTWIDTH] IN BLOOD BY AUTOMATED COUNT: 13.8 % (ref 11–15)
ERYTHROCYTE [SEDIMENTATION RATE] IN BLOOD: 7 MM/HR (ref 0–30)
HCT VFR BLD AUTO: 45.2 % (ref 35–48)
HGB BLD-MCNC: 15.2 G/DL (ref 12–16)
IMM GRANULOCYTES # BLD AUTO: 0.01 X10(3) UL (ref 0–1)
IMM GRANULOCYTES NFR BLD: 0.1 %
LYMPHOCYTES # BLD AUTO: 2.6 X10(3) UL (ref 1–4)
LYMPHOCYTES NFR BLD AUTO: 37.5 %
MCH RBC QN AUTO: 30 PG (ref 26–34)
MCHC RBC AUTO-ENTMCNC: 33.6 G/DL (ref 31–37)
MCV RBC AUTO: 89.2 FL (ref 80–100)
MONOCYTES # BLD AUTO: 0.37 X10(3) UL (ref 0.1–1)
MONOCYTES NFR BLD AUTO: 5.3 %
NEUTROPHILS # BLD AUTO: 3.78 X10 (3) UL (ref 1.5–7.7)
NEUTROPHILS # BLD AUTO: 3.78 X10(3) UL (ref 1.5–7.7)
NEUTROPHILS NFR BLD AUTO: 54.5 %
PLATELET # BLD AUTO: 223 10(3)UL (ref 150–450)
RBC # BLD AUTO: 5.07 X10(6)UL (ref 3.8–5.3)
RHEUMATOID FACT SERPL-ACNC: 10 IU/ML (ref ?–15)
WBC # BLD AUTO: 6.9 X10(3) UL (ref 4–11)

## 2019-07-18 PROCEDURE — 86235 NUCLEAR ANTIGEN ANTIBODY: CPT

## 2019-07-18 PROCEDURE — 85652 RBC SED RATE AUTOMATED: CPT

## 2019-07-18 PROCEDURE — 85025 COMPLETE CBC W/AUTO DIFF WBC: CPT

## 2019-07-18 PROCEDURE — 86200 CCP ANTIBODY: CPT

## 2019-07-18 PROCEDURE — 86431 RHEUMATOID FACTOR QUANT: CPT

## 2019-07-18 PROCEDURE — 86039 ANTINUCLEAR ANTIBODIES (ANA): CPT

## 2019-07-18 PROCEDURE — 86038 ANTINUCLEAR ANTIBODIES: CPT

## 2019-07-18 PROCEDURE — 36415 COLL VENOUS BLD VENIPUNCTURE: CPT

## 2019-07-18 PROCEDURE — 86140 C-REACTIVE PROTEIN: CPT

## 2019-07-18 PROCEDURE — 86225 DNA ANTIBODY NATIVE: CPT

## 2019-07-19 LAB
CCP IGG SERPL-ACNC: 0.7 U/ML (ref 0–6.9)
DSDNA AB TITR SER: <10 {TITER}

## 2019-07-21 LAB
HISTONE AB, IGG: 0.5 UNITS
SCLERODERMA (SCL-70) (ENA) AB, IGG: 0 AU/ML

## 2019-07-22 LAB — NUCLEAR IGG TITR SER IF: POSITIVE {TITER}

## 2019-07-23 LAB
ANA NUCLEOLAR TITR SER IF: 1280 {TITER}
ENA SM IGG SER QL: NEGATIVE
ENA SM+RNP AB SER QL: NEGATIVE
ENA SS-A AB SER QL IA: NEGATIVE
ENA SS-A AB SER QL IA: NEGATIVE
ENA SS-B AB SER QL IA: NEGATIVE
ENA SS-B AB SER QL IA: NEGATIVE

## 2019-07-31 RX ORDER — LEVOTHYROXINE SODIUM 0.1 MG/1
100 TABLET ORAL
Qty: 30 TABLET | Refills: 5 | Status: SHIPPED | OUTPATIENT
Start: 2019-07-31 | End: 2020-02-25

## 2019-07-31 NOTE — TELEPHONE ENCOUNTER
Left message for patient that requested medication( Levothyroxine) has been refilled and to please have blood work completed in 2 weeks, any questions to please contact our office.

## 2019-07-31 NOTE — TELEPHONE ENCOUNTER
Received refill request for Levothyroxine 100 mcg- 1 tablet by mouth daily # 30    LOV 7-3-19 per progress notes to repeat TSH in 6 weeks. Refill request pended and forwarded to [facility] for further directions.

## 2019-08-17 ENCOUNTER — APPOINTMENT (OUTPATIENT)
Dept: LAB | Age: 49
End: 2019-08-17
Attending: INTERNAL MEDICINE
Payer: COMMERCIAL

## 2019-08-17 DIAGNOSIS — E03.9 ACQUIRED HYPOTHYROIDISM: ICD-10-CM

## 2019-08-17 DIAGNOSIS — E78.00 PURE HYPERCHOLESTEROLEMIA: ICD-10-CM

## 2019-08-17 LAB
ALBUMIN SERPL-MCNC: 3.5 G/DL (ref 3.4–5)
ALP LIVER SERPL-CCNC: 81 U/L (ref 41–108)
ALT SERPL-CCNC: 31 U/L (ref 13–56)
AST SERPL-CCNC: 22 U/L (ref 15–37)
BILIRUB DIRECT SERPL-MCNC: 0.1 MG/DL (ref 0–0.2)
BILIRUB SERPL-MCNC: 0.4 MG/DL (ref 0.1–2)
CHOLEST SMN-MCNC: 162 MG/DL (ref ?–200)
HDLC SERPL-MCNC: 59 MG/DL (ref 40–59)
LDLC SERPL CALC-MCNC: 87 MG/DL (ref ?–100)
M PROTEIN MFR SERPL ELPH: 6.9 G/DL (ref 6.4–8.2)
NONHDLC SERPL-MCNC: 103 MG/DL (ref ?–130)
PATIENT FASTING: YES
TRIGL SERPL-MCNC: 78 MG/DL (ref 30–149)
TSI SER-ACNC: 0.23 MIU/ML (ref 0.36–3.74)
VLDLC SERPL CALC-MCNC: 16 MG/DL (ref 0–30)

## 2019-08-17 PROCEDURE — 80061 LIPID PANEL: CPT

## 2019-08-17 PROCEDURE — 80076 HEPATIC FUNCTION PANEL: CPT

## 2019-08-17 PROCEDURE — 84443 ASSAY THYROID STIM HORMONE: CPT

## 2019-08-17 PROCEDURE — 36415 COLL VENOUS BLD VENIPUNCTURE: CPT

## 2019-08-19 ENCOUNTER — TELEPHONE (OUTPATIENT)
Dept: NEPHROLOGY | Facility: CLINIC | Age: 49
End: 2019-08-19

## 2019-08-19 DIAGNOSIS — E05.90 HYPERTHYROIDISM: Primary | ICD-10-CM

## 2019-08-19 NOTE — TELEPHONE ENCOUNTER
Cholesterol is much better. CPM.  Thyroid though now looks mildly overactive. For now CPM but do a TSH with reflex in 1 month.

## 2019-08-21 NOTE — TELEPHONE ENCOUNTER
Patient contacted. Results message from Dr. Aguilar Niko read to patient. She is aware to do lab work in 1 month. Order entered in [address] [place] [address] Rd.

## 2019-08-24 ENCOUNTER — LAB ENCOUNTER (OUTPATIENT)
Dept: LAB | Age: 49
End: 2019-08-24
Attending: INTERNAL MEDICINE
Payer: COMMERCIAL

## 2019-08-24 DIAGNOSIS — Z08 ENCOUNTER FOR FOLLOW-UP SURVEILLANCE OF LYMPHOMA: ICD-10-CM

## 2019-08-24 DIAGNOSIS — C84.70 ANAPLASTIC ALK-NEGATIVE LARGE CELL LYMPHOMA, UNSPECIFIED BODY REGION (HCC): ICD-10-CM

## 2019-08-24 DIAGNOSIS — Z85.72 ENCOUNTER FOR FOLLOW-UP SURVEILLANCE OF LYMPHOMA: ICD-10-CM

## 2019-08-24 DIAGNOSIS — Z94.84 HISTORY OF STEM CELL TRANSPLANT (HCC): ICD-10-CM

## 2019-08-24 LAB
ALBUMIN SERPL-MCNC: 3.8 G/DL (ref 3.4–5)
ALBUMIN/GLOB SERPL: 1.2 {RATIO} (ref 1–2)
ALP LIVER SERPL-CCNC: 84 U/L (ref 41–108)
ALT SERPL-CCNC: 29 U/L (ref 13–56)
ANION GAP SERPL CALC-SCNC: 7 MMOL/L (ref 0–18)
AST SERPL-CCNC: 21 U/L (ref 15–37)
BASOPHILS # BLD AUTO: 0.05 X10(3) UL (ref 0–0.2)
BASOPHILS NFR BLD AUTO: 0.8 %
BILIRUB SERPL-MCNC: 0.4 MG/DL (ref 0.1–2)
BUN BLD-MCNC: 16 MG/DL (ref 7–18)
BUN/CREAT SERPL: 25 (ref 10–20)
CALCIUM BLD-MCNC: 9.1 MG/DL (ref 8.5–10.1)
CHLORIDE SERPL-SCNC: 109 MMOL/L (ref 98–112)
CO2 SERPL-SCNC: 29 MMOL/L (ref 21–32)
CREAT BLD-MCNC: 0.64 MG/DL (ref 0.55–1.02)
DEPRECATED RDW RBC AUTO: 45.4 FL (ref 35.1–46.3)
EOSINOPHIL # BLD AUTO: 0.14 X10(3) UL (ref 0–0.7)
EOSINOPHIL NFR BLD AUTO: 2.3 %
ERYTHROCYTE [DISTWIDTH] IN BLOOD BY AUTOMATED COUNT: 13.7 % (ref 11–15)
GLOBULIN PLAS-MCNC: 3.3 G/DL (ref 2.8–4.4)
GLUCOSE BLD-MCNC: 100 MG/DL (ref 70–99)
HCT VFR BLD AUTO: 45.3 % (ref 35–48)
HGB BLD-MCNC: 14.9 G/DL (ref 12–16)
IMM GRANULOCYTES # BLD AUTO: 0.01 X10(3) UL (ref 0–1)
IMM GRANULOCYTES NFR BLD: 0.2 %
LDH SERPL L TO P-CCNC: 163 U/L (ref 84–246)
LYMPHOCYTES # BLD AUTO: 2.21 X10(3) UL (ref 1–4)
LYMPHOCYTES NFR BLD AUTO: 36.3 %
M PROTEIN MFR SERPL ELPH: 7.1 G/DL (ref 6.4–8.2)
MCH RBC QN AUTO: 29.7 PG (ref 26–34)
MCHC RBC AUTO-ENTMCNC: 32.9 G/DL (ref 31–37)
MCV RBC AUTO: 90.2 FL (ref 80–100)
MONOCYTES # BLD AUTO: 0.53 X10(3) UL (ref 0.1–1)
MONOCYTES NFR BLD AUTO: 8.7 %
NEUTROPHILS # BLD AUTO: 3.15 X10 (3) UL (ref 1.5–7.7)
NEUTROPHILS # BLD AUTO: 3.15 X10(3) UL (ref 1.5–7.7)
NEUTROPHILS NFR BLD AUTO: 51.7 %
OSMOLALITY SERPL CALC.SUM OF ELEC: 301 MOSM/KG (ref 275–295)
PATIENT FASTING: YES
PLATELET # BLD AUTO: 221 10(3)UL (ref 150–450)
POTASSIUM SERPL-SCNC: 4.1 MMOL/L (ref 3.5–5.1)
RBC # BLD AUTO: 5.02 X10(6)UL (ref 3.8–5.3)
SODIUM SERPL-SCNC: 145 MMOL/L (ref 136–145)
WBC # BLD AUTO: 6.1 X10(3) UL (ref 4–11)

## 2019-08-24 PROCEDURE — 85025 COMPLETE CBC W/AUTO DIFF WBC: CPT

## 2019-08-24 PROCEDURE — 83615 LACTATE (LD) (LDH) ENZYME: CPT

## 2019-08-24 PROCEDURE — 80053 COMPREHEN METABOLIC PANEL: CPT

## 2019-08-24 PROCEDURE — 36415 COLL VENOUS BLD VENIPUNCTURE: CPT

## 2019-08-26 ENCOUNTER — OFFICE VISIT (OUTPATIENT)
Dept: HEMATOLOGY/ONCOLOGY | Facility: HOSPITAL | Age: 49
End: 2019-08-26
Attending: INTERNAL MEDICINE
Payer: COMMERCIAL

## 2019-08-26 VITALS
WEIGHT: 254 LBS | HEART RATE: 97 BPM | RESPIRATION RATE: 16 BRPM | SYSTOLIC BLOOD PRESSURE: 112 MMHG | DIASTOLIC BLOOD PRESSURE: 83 MMHG | BODY MASS INDEX: 40.82 KG/M2 | TEMPERATURE: 98 F | HEIGHT: 66 IN | OXYGEN SATURATION: 96 %

## 2019-08-26 DIAGNOSIS — Z94.84 HISTORY OF STEM CELL TRANSPLANT (HCC): ICD-10-CM

## 2019-08-26 DIAGNOSIS — C84.70 ANAPLASTIC ALK-NEGATIVE LARGE CELL LYMPHOMA, UNSPECIFIED BODY REGION (HCC): Primary | ICD-10-CM

## 2019-08-26 DIAGNOSIS — Z08 ENCOUNTER FOR FOLLOW-UP SURVEILLANCE OF LYMPHOMA: ICD-10-CM

## 2019-08-26 DIAGNOSIS — Z85.72 ENCOUNTER FOR FOLLOW-UP SURVEILLANCE OF LYMPHOMA: ICD-10-CM

## 2019-08-26 PROCEDURE — 99215 OFFICE O/P EST HI 40 MIN: CPT | Performed by: INTERNAL MEDICINE

## 2019-08-26 RX ORDER — ERYTHROMYCIN 5 MG/G
1 OINTMENT OPHTHALMIC ONCE
COMMUNITY
End: 2021-03-12 | Stop reason: ALTCHOICE

## 2019-08-26 RX ORDER — DOXYCYCLINE HYCLATE 50 MG/1
50 CAPSULE ORAL 2 TIMES DAILY
COMMUNITY
End: 2020-02-03

## 2019-08-26 RX ORDER — FLUOROMETHOLONE 0.1 %
1 SUSPENSION, DROPS(FINAL DOSAGE FORM)(ML) OPHTHALMIC (EYE) ONCE
COMMUNITY
End: 2020-08-27 | Stop reason: ALTCHOICE

## 2019-08-26 NOTE — PROGRESS NOTES
Name: Berna Cooper  MRN: [id number]  YOB: 1970  CSN: [id number]  Date of Visit: 8/26/2019      Chief Complaint: ALCL; ALK negative, stage III      HPI:  Ms. Maia López is seen in the office for follow up regarding ALK negative ALCL.   Brigette Constitutional: Negative for activity change, appetite change, chills, diaphoresis, fatigue, fever and unexpected weight change. HENT: Negative for congestion, ear pain, nosebleeds, postnasal drip, sore throat, trouble swallowing and voice change.     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 She appears well-developed and well-nourished. No distress. HENT:   Head: Normocephalic and atraumatic. Nose: Nose normal.   Mouth/Throat: Oropharynx is clear and moist. No oropharyngeal exudate.    Eyes: Pupils are equal, round, and reactive to l 08/24/2019    GFRAA 119 08/24/2019    CA 9.1 08/24/2019    OSMOCALC 301 (H) 08/24/2019    ALKPHO 84 08/24/2019    AST 21 08/24/2019    ALT 29 08/24/2019    ALKPHOS 77 08/02/2016    BILT 0.4 08/24/2019    TP 7.1 08/24/2019    ALB 3.8 08/24/2019    GLOBULIN

## 2019-09-21 ENCOUNTER — APPOINTMENT (OUTPATIENT)
Dept: LAB | Age: 49
End: 2019-09-21
Attending: INTERNAL MEDICINE
Payer: COMMERCIAL

## 2019-09-21 DIAGNOSIS — E05.90 HYPERTHYROIDISM: ICD-10-CM

## 2019-09-21 LAB — TSI SER-ACNC: 0.49 MIU/ML (ref 0.36–3.74)

## 2019-09-21 PROCEDURE — 84443 ASSAY THYROID STIM HORMONE: CPT

## 2019-09-21 PROCEDURE — 36415 COLL VENOUS BLD VENIPUNCTURE: CPT

## 2019-12-18 ENCOUNTER — TELEPHONE (OUTPATIENT)
Dept: OBGYN CLINIC | Facility: CLINIC | Age: 49
End: 2019-12-18

## 2019-12-18 DIAGNOSIS — Z12.31 VISIT FOR SCREENING MAMMOGRAM: Primary | ICD-10-CM

## 2019-12-18 NOTE — TELEPHONE ENCOUNTER
Informed pt that the message will be sent to [facility] to see if pt can have an order before her appt with you.

## 2019-12-19 NOTE — TELEPHONE ENCOUNTER
Informed pt that [facility] stated ok for mamm order. Pt stated understanding.

## 2020-01-07 ENCOUNTER — TELEPHONE (OUTPATIENT)
Dept: NEPHROLOGY | Facility: CLINIC | Age: 50
End: 2020-01-07

## 2020-01-07 ENCOUNTER — HOSPITAL ENCOUNTER (OUTPATIENT)
Dept: MAMMOGRAPHY | Age: 50
Discharge: HOME | End: 2020-01-07
Attending: OBSTETRICS & GYNECOLOGY
Payer: COMMERCIAL

## 2020-01-07 DIAGNOSIS — Z12.31 VISIT FOR SCREENING MAMMOGRAM: ICD-10-CM

## 2020-01-07 PROCEDURE — 77063 BREAST TOMOSYNTHESIS BI: CPT | Performed by: OBSTETRICS & GYNECOLOGY

## 2020-01-07 PROCEDURE — 77067 SCR MAMMO BI INCL CAD: CPT | Performed by: OBSTETRICS & GYNECOLOGY

## 2020-01-07 NOTE — TELEPHONE ENCOUNTER
Spoke to dental office. Patient is scheduled for a cleaning, fillings, possible extraction with local anesthesia.

## 2020-01-07 NOTE — TELEPHONE ENCOUNTER
Kaushik/[facility] requesting medical clearance. Kaushik states medical clearance letter was faxed four days ago. Pt has procedure scheduled for today in the afternoon.  Please call [phone]    Fax: [phone]

## 2020-01-07 NOTE — TELEPHONE ENCOUNTER
Fax was received on 1/3/20 and was still at  in sorting pile. Placed on [facility]'s desk.

## 2020-01-08 NOTE — TELEPHONE ENCOUNTER
Patient never returned call but when clearance note was faxed I wrote on it that Dr. Luis Navarro was clearing patient as long as she was feeling ok.

## 2020-01-09 NOTE — TELEPHONE ENCOUNTER
Manager at [facility] needs clarification on surgical clearance form. He states that the pt is in the chair now at their office. I transferred the call to Nephro RN.

## 2020-02-03 ENCOUNTER — OFFICE VISIT (OUTPATIENT)
Dept: OBGYN CLINIC | Facility: CLINIC | Age: 50
End: 2020-02-03
Payer: COMMERCIAL

## 2020-02-03 VITALS
SYSTOLIC BLOOD PRESSURE: 119 MMHG | BODY MASS INDEX: 40.98 KG/M2 | HEART RATE: 90 BPM | HEIGHT: 66 IN | WEIGHT: 255 LBS | DIASTOLIC BLOOD PRESSURE: 81 MMHG

## 2020-02-03 DIAGNOSIS — Z01.419 ENCOUNTER FOR GYNECOLOGICAL EXAMINATION: Primary | ICD-10-CM

## 2020-02-03 PROCEDURE — 99396 PREV VISIT EST AGE 40-64: CPT | Performed by: OBSTETRICS & GYNECOLOGY

## 2020-02-04 NOTE — PROGRESS NOTES
Kirk Shore is a 44year old female  No LMP recorded. (Menstrual status: Chemo). here for annual exam.       Last seen 10/29/18. Last pap 10/2018 normal with neg HPV. Last mammogram 2020. History of non Hodgkin lymphoma.       Planning to Relation Age of Onset   • Diabetes Father    • Heart Disease Father    • Other (Other) Father    • Thyroid disease Sister    • Other (lupus) Sister    • Other (sjogren's) Sister    • Heart Disease Other         Grandparents   • Other (Other) Mother kg)  08/26/19 : 254 lb (115.2 kg)    Body mass index is 41.16 kg/m².     Constitutional: well developed, well nourished  Neck/Thyroid: thyroid symmetric, no nodules  Heart:  Regular rate and rhythm  Lungs:  Clear to asculation  Breast: normal without palpab

## 2020-02-08 DIAGNOSIS — E78.00 PURE HYPERCHOLESTEROLEMIA: ICD-10-CM

## 2020-02-08 DIAGNOSIS — Z00.00 HEALTH MAINTENANCE EXAMINATION: ICD-10-CM

## 2020-02-08 DIAGNOSIS — E05.90 HYPERTHYROIDISM: Primary | ICD-10-CM

## 2020-02-10 RX ORDER — ROSUVASTATIN CALCIUM 40 MG/1
TABLET, COATED ORAL
Qty: 30 TABLET | Refills: 1 | Status: SHIPPED | OUTPATIENT
Start: 2020-02-10 | End: 2020-02-25

## 2020-02-15 ENCOUNTER — APPOINTMENT (OUTPATIENT)
Dept: LAB | Age: 50
End: 2020-02-15
Attending: INTERNAL MEDICINE
Payer: COMMERCIAL

## 2020-02-15 DIAGNOSIS — Z00.00 HEALTH MAINTENANCE EXAMINATION: ICD-10-CM

## 2020-02-15 DIAGNOSIS — E78.00 PURE HYPERCHOLESTEROLEMIA: ICD-10-CM

## 2020-02-15 DIAGNOSIS — E05.90 HYPERTHYROIDISM: ICD-10-CM

## 2020-02-15 LAB
ALBUMIN SERPL-MCNC: 3.8 G/DL (ref 3.4–5)
ALP LIVER SERPL-CCNC: 94 U/L (ref 41–108)
ALT SERPL-CCNC: 26 U/L (ref 13–56)
AST SERPL-CCNC: 21 U/L (ref 15–37)
BILIRUB DIRECT SERPL-MCNC: 0.1 MG/DL (ref 0–0.2)
BILIRUB SERPL-MCNC: 0.5 MG/DL (ref 0.1–2)
CHOLEST SMN-MCNC: 165 MG/DL (ref ?–200)
HDLC SERPL-MCNC: 63 MG/DL (ref 40–59)
LDLC SERPL CALC-MCNC: 87 MG/DL (ref ?–100)
M PROTEIN MFR SERPL ELPH: 7.2 G/DL (ref 6.4–8.2)
NONHDLC SERPL-MCNC: 102 MG/DL (ref ?–130)
PATIENT FASTING Y/N/NP: YES
TRIGL SERPL-MCNC: 77 MG/DL (ref 30–149)
TSI SER-ACNC: 2.23 MIU/ML (ref 0.36–3.74)
VLDLC SERPL CALC-MCNC: 15 MG/DL (ref 0–30)

## 2020-02-15 PROCEDURE — 80076 HEPATIC FUNCTION PANEL: CPT

## 2020-02-15 PROCEDURE — 36415 COLL VENOUS BLD VENIPUNCTURE: CPT

## 2020-02-15 PROCEDURE — 84443 ASSAY THYROID STIM HORMONE: CPT

## 2020-02-15 PROCEDURE — 80061 LIPID PANEL: CPT

## 2020-02-17 ENCOUNTER — TELEPHONE (OUTPATIENT)
Dept: HEMATOLOGY/ONCOLOGY | Facility: HOSPITAL | Age: 50
End: 2020-02-17

## 2020-02-17 NOTE — TELEPHONE ENCOUNTER
Claude Herrlich was wondering if  could fill out a form for adoption, and order her a TB test. She said she will fax over the adoption form. She says the form can be picked up at her appointment on 2/27.  Please call

## 2020-02-19 ENCOUNTER — TELEPHONE (OUTPATIENT)
Dept: NEPHROLOGY | Facility: CLINIC | Age: 50
End: 2020-02-19

## 2020-02-19 NOTE — TELEPHONE ENCOUNTER
Paperwork given to [facility] MSW. She will f/u with pt, clearance should come from PCP as does the TBtesting.

## 2020-02-19 NOTE — TELEPHONE ENCOUNTER
Tried to call patient. Phone rang several times then went to busy signal. Patient just needs to schedule a Nurse Visit and return in 2 days for final reading.  Cannot be scheduled on a Thursday or Friday (office closed on weekends)

## 2020-02-19 NOTE — TELEPHONE ENCOUNTER
Pt asking how to get a TB test done - she also needs health form filled out to be  - she faxed it over

## 2020-02-21 NOTE — TELEPHONE ENCOUNTER
Patient returned nurse call, patient asked to call back after 2:45 PM on mobile# [phone],thanks.

## 2020-02-21 NOTE — TELEPHONE ENCOUNTER
Patient contacted. Appointment with Dr. Edgar Tompkins scheduled for 2/25/2020 and will get PPD skin test at this appointment. She is aware that she will have to return in 72 hours for final reading.  She has a form for Dr. Edgar Tompkins to fill out (was faxed to the of

## 2020-02-25 ENCOUNTER — OFFICE VISIT (OUTPATIENT)
Dept: NEPHROLOGY | Facility: CLINIC | Age: 50
End: 2020-02-25
Payer: COMMERCIAL

## 2020-02-25 VITALS
HEIGHT: 66 IN | WEIGHT: 257 LBS | SYSTOLIC BLOOD PRESSURE: 117 MMHG | DIASTOLIC BLOOD PRESSURE: 74 MMHG | HEART RATE: 87 BPM | BODY MASS INDEX: 41.3 KG/M2

## 2020-02-25 DIAGNOSIS — Z01.84 IMMUNITY STATUS TESTING: ICD-10-CM

## 2020-02-25 DIAGNOSIS — E03.9 ACQUIRED HYPOTHYROIDISM: ICD-10-CM

## 2020-02-25 DIAGNOSIS — E78.00 PURE HYPERCHOLESTEROLEMIA: Primary | ICD-10-CM

## 2020-02-25 PROCEDURE — 86580 TB INTRADERMAL TEST: CPT | Performed by: INTERNAL MEDICINE

## 2020-02-25 PROCEDURE — 99214 OFFICE O/P EST MOD 30 MIN: CPT | Performed by: INTERNAL MEDICINE

## 2020-02-25 RX ORDER — LEVOTHYROXINE SODIUM 0.1 MG/1
100 TABLET ORAL
Qty: 30 TABLET | Refills: 5 | Status: SHIPPED | OUTPATIENT
Start: 2020-02-25 | End: 2020-08-27

## 2020-02-25 RX ORDER — ROSUVASTATIN CALCIUM 40 MG/1
TABLET, COATED ORAL
Qty: 30 TABLET | Refills: 5 | Status: SHIPPED | OUTPATIENT
Start: 2020-02-25 | End: 2020-09-15

## 2020-02-25 NOTE — PROGRESS NOTES
Patient identified by full name and date of birth. PPD 0.5ml given intradermal per written order in Immunizations. Will RTC in 72 hours for final reading. Dr. Debra Singh has the form and will giver to patient when test is completed.

## 2020-02-26 NOTE — PROGRESS NOTES
[facility]  Nephrology Daily Progress Note    Huseyin Jeronimo  [id number]  44year old  Patient presents with:  Complete Form  Medication Request      HPI:   Huseyin Jeronimo is a 44year old female.   26-year-old female with a history of allergic rhi Location - - -   Patient Position - - -   Cuff Size - - -   Pulse 90 - 87   Resp - - -   Temp - - -   SpO2 - - -   Weight 255 lbs - 257 lbs   Height 66 - 66   BODY MASS INDEX - 41.48 -       VITALS: WEIGHT ONLY 8/26/2019 2/3/2020 2/25/2020   Weight 254 lbs Ophthalmic Suspension, Place 1 drop into both eyes one time. , Disp: , Rfl:   •  erythromycin 5 MG/GM Ophthalmic Ointment, Place 1 Application into both eyes once., Disp: , Rfl:   •  Cholecalciferol (VITAMIN D) 1000 units Oral Tab, Take 1,000 Units by mouth

## 2020-02-27 ENCOUNTER — OFFICE VISIT (OUTPATIENT)
Dept: HEMATOLOGY/ONCOLOGY | Facility: HOSPITAL | Age: 50
End: 2020-02-27
Attending: INTERNAL MEDICINE
Payer: COMMERCIAL

## 2020-02-27 VITALS
SYSTOLIC BLOOD PRESSURE: 120 MMHG | OXYGEN SATURATION: 99 % | HEIGHT: 66 IN | RESPIRATION RATE: 16 BRPM | HEART RATE: 80 BPM | WEIGHT: 261 LBS | DIASTOLIC BLOOD PRESSURE: 63 MMHG | BODY MASS INDEX: 41.95 KG/M2 | TEMPERATURE: 98 F

## 2020-02-27 DIAGNOSIS — Z94.84 HISTORY OF STEM CELL TRANSPLANT (HCC): ICD-10-CM

## 2020-02-27 DIAGNOSIS — Z85.72 ENCOUNTER FOR FOLLOW-UP SURVEILLANCE OF LYMPHOMA: ICD-10-CM

## 2020-02-27 DIAGNOSIS — C84.70 ANAPLASTIC ALK-NEGATIVE LARGE CELL LYMPHOMA, UNSPECIFIED BODY REGION (HCC): ICD-10-CM

## 2020-02-27 DIAGNOSIS — Z08 ENCOUNTER FOR FOLLOW-UP SURVEILLANCE OF LYMPHOMA: ICD-10-CM

## 2020-02-27 LAB
ALBUMIN SERPL-MCNC: 3.7 G/DL (ref 3.4–5)
ALBUMIN/GLOB SERPL: 1.1 {RATIO} (ref 1–2)
ALP LIVER SERPL-CCNC: 87 U/L (ref 41–108)
ALT SERPL-CCNC: 28 U/L (ref 13–56)
ANION GAP SERPL CALC-SCNC: 4 MMOL/L (ref 0–18)
AST SERPL-CCNC: 17 U/L (ref 15–37)
BASOPHILS # BLD AUTO: 0.07 X10(3) UL (ref 0–0.2)
BASOPHILS NFR BLD AUTO: 0.8 %
BILIRUB SERPL-MCNC: 0.2 MG/DL (ref 0.1–2)
BUN BLD-MCNC: 16 MG/DL (ref 7–18)
BUN/CREAT SERPL: 21.1 (ref 10–20)
CALCIUM BLD-MCNC: 9.2 MG/DL (ref 8.5–10.1)
CHLORIDE SERPL-SCNC: 108 MMOL/L (ref 98–112)
CO2 SERPL-SCNC: 29 MMOL/L (ref 21–32)
CREAT BLD-MCNC: 0.76 MG/DL (ref 0.55–1.02)
DEPRECATED RDW RBC AUTO: 45.4 FL (ref 35.1–46.3)
EOSINOPHIL # BLD AUTO: 0.16 X10(3) UL (ref 0–0.7)
EOSINOPHIL NFR BLD AUTO: 1.9 %
ERYTHROCYTE [DISTWIDTH] IN BLOOD BY AUTOMATED COUNT: 13.9 % (ref 11–15)
GLOBULIN PLAS-MCNC: 3.5 G/DL (ref 2.8–4.4)
GLUCOSE BLD-MCNC: 95 MG/DL (ref 70–99)
HCT VFR BLD AUTO: 42.8 % (ref 35–48)
HGB BLD-MCNC: 14.3 G/DL (ref 12–16)
IMM GRANULOCYTES # BLD AUTO: 0.02 X10(3) UL (ref 0–1)
IMM GRANULOCYTES NFR BLD: 0.2 %
LDH SERPL L TO P-CCNC: 188 U/L
LYMPHOCYTES # BLD AUTO: 3.21 X10(3) UL (ref 1–4)
LYMPHOCYTES NFR BLD AUTO: 37.4 %
M PROTEIN MFR SERPL ELPH: 7.2 G/DL (ref 6.4–8.2)
MCH RBC QN AUTO: 30 PG (ref 26–34)
MCHC RBC AUTO-ENTMCNC: 33.4 G/DL (ref 31–37)
MCV RBC AUTO: 89.7 FL (ref 80–100)
MONOCYTES # BLD AUTO: 0.59 X10(3) UL (ref 0.1–1)
MONOCYTES NFR BLD AUTO: 6.9 %
NEUTROPHILS # BLD AUTO: 4.54 X10 (3) UL (ref 1.5–7.7)
NEUTROPHILS # BLD AUTO: 4.54 X10(3) UL (ref 1.5–7.7)
NEUTROPHILS NFR BLD AUTO: 52.8 %
OSMOLALITY SERPL CALC.SUM OF ELEC: 293 MOSM/KG (ref 275–295)
PATIENT FASTING Y/N/NP: NO
PLATELET # BLD AUTO: 217 10(3)UL (ref 150–450)
POTASSIUM SERPL-SCNC: 3.9 MMOL/L (ref 3.5–5.1)
RBC # BLD AUTO: 4.77 X10(6)UL (ref 3.8–5.3)
SODIUM SERPL-SCNC: 141 MMOL/L (ref 136–145)
WBC # BLD AUTO: 8.6 X10(3) UL (ref 4–11)

## 2020-02-27 PROCEDURE — 99215 OFFICE O/P EST HI 40 MIN: CPT | Performed by: INTERNAL MEDICINE

## 2020-02-28 DIAGNOSIS — C84.70 ANAPLASTIC ALK-NEGATIVE LARGE CELL LYMPHOMA, UNSPECIFIED BODY REGION (HCC): Primary | ICD-10-CM

## 2020-02-28 NOTE — CONSULTS
Name: Gee Fair  MRN: [id number]  YOB: 1970  CSN: [id number]  Date of Visit: 2/27/2020      Chief Complaint: ALCL; ALK negative, stage III      HPI:  Ms. Brody Arriaza is seen in the office for follow up regarding ALK negative ALCL.   Brigette Constitutional: Negative for activity change, appetite change, chills, diaphoresis, fatigue, fever and unexpected weight change. HENT: Negative for congestion, ear pain, nosebleeds, postnasal drip, sore throat, trouble swallowing and voice change.     E • History of blood transfusion    • Hypothyroidism     Hypothyroidism, primary   • Other malignant lymphomas of intra-abdominal lymph nodes 05/15/2014   • Rheumatoid arthritis ([facility] [address]    • Sleep apnea    • Swelling of limb 05/20/2014     Past Surgical Hist Attends meetings of clubs or organizations: Not on file        Relationship status: Not on file      Intimate partner violence:        Fear of current or ex partner: Not on file        Emotionally abused: Not on file        Physically abused: Not on file distress. HENT:   Head: Normocephalic and atraumatic. Nose: Nose normal.   Mouth/Throat: Oropharynx is clear and moist. No oropharyngeal exudate. Eyes: Pupils are equal, round, and reactive to light.  Conjunctivae and EOM are normal.   Neck: Normal ra 02/27/2020    [address] 293 02/27/2020    ALKPHO 87 02/27/2020    AST 17 02/27/2020    ALT 28 02/27/2020    ALKPHOS 77 08/02/2016    BILT 0.2 02/27/2020    TP 7.2 02/27/2020    ALB 3.7 02/27/2020    GLOBULIN 3.5 02/27/2020    AGRATIO 1.2 08/02/2016    NA 14

## 2020-02-29 ENCOUNTER — TELEPHONE (OUTPATIENT)
Dept: NEPHROLOGY | Facility: CLINIC | Age: 50
End: 2020-02-29

## 2020-03-09 ENCOUNTER — TELEPHONE (OUTPATIENT)
Dept: HEMATOLOGY/ONCOLOGY | Facility: HOSPITAL | Age: 50
End: 2020-03-09

## 2020-03-09 NOTE — TELEPHONE ENCOUNTER
----- Message from Tucker Davis, RN sent at 2/28/2020 10:05 AM CST -----  Regarding: one year f/u with labs  Plse call to schedule one year follow up with labs a day prior. Thank you!   Mikayla Shen

## 2020-06-30 NOTE — Clinical Note
Please schedule patient for EMG test and follow-up video after MRI Refill for one touch  Last appt 6-26-20  No pend appt     Refill sent

## 2020-08-24 ENCOUNTER — LAB ENCOUNTER (OUTPATIENT)
Dept: LAB | Age: 50
End: 2020-08-24
Attending: INTERNAL MEDICINE
Payer: COMMERCIAL

## 2020-08-24 DIAGNOSIS — C84.70 ANAPLASTIC ALK-NEGATIVE LARGE CELL LYMPHOMA, UNSPECIFIED BODY REGION (HCC): ICD-10-CM

## 2020-08-24 LAB
ALBUMIN SERPL-MCNC: 3.7 G/DL (ref 3.4–5)
ALBUMIN/GLOB SERPL: 1 {RATIO} (ref 1–2)
ALP LIVER SERPL-CCNC: 87 U/L (ref 41–108)
ALT SERPL-CCNC: 30 U/L (ref 13–56)
ANION GAP SERPL CALC-SCNC: 5 MMOL/L (ref 0–18)
AST SERPL-CCNC: 22 U/L (ref 15–37)
BASOPHILS # BLD AUTO: 0.06 X10(3) UL (ref 0–0.2)
BASOPHILS NFR BLD AUTO: 0.7 %
BILIRUB SERPL-MCNC: 0.3 MG/DL (ref 0.1–2)
BUN BLD-MCNC: 16 MG/DL (ref 7–18)
BUN/CREAT SERPL: 20 (ref 10–20)
CALCIUM BLD-MCNC: 9.1 MG/DL (ref 8.5–10.1)
CHLORIDE SERPL-SCNC: 107 MMOL/L (ref 98–112)
CO2 SERPL-SCNC: 30 MMOL/L (ref 21–32)
CREAT BLD-MCNC: 0.8 MG/DL (ref 0.55–1.02)
DEPRECATED RDW RBC AUTO: 45.7 FL (ref 35.1–46.3)
EOSINOPHIL # BLD AUTO: 0.14 X10(3) UL (ref 0–0.7)
EOSINOPHIL NFR BLD AUTO: 1.7 %
ERYTHROCYTE [DISTWIDTH] IN BLOOD BY AUTOMATED COUNT: 13.9 % (ref 11–15)
GLOBULIN PLAS-MCNC: 3.6 G/DL (ref 2.8–4.4)
GLUCOSE BLD-MCNC: 96 MG/DL (ref 70–99)
HCT VFR BLD AUTO: 44.8 % (ref 35–48)
HGB BLD-MCNC: 15 G/DL (ref 12–16)
IMM GRANULOCYTES # BLD AUTO: 0.01 X10(3) UL (ref 0–1)
IMM GRANULOCYTES NFR BLD: 0.1 %
LDH SERPL L TO P-CCNC: 215 U/L
LYMPHOCYTES # BLD AUTO: 3.27 X10(3) UL (ref 1–4)
LYMPHOCYTES NFR BLD AUTO: 38.8 %
M PROTEIN MFR SERPL ELPH: 7.3 G/DL (ref 6.4–8.2)
MCH RBC QN AUTO: 30.1 PG (ref 26–34)
MCHC RBC AUTO-ENTMCNC: 33.5 G/DL (ref 31–37)
MCV RBC AUTO: 90 FL (ref 80–100)
MONOCYTES # BLD AUTO: 0.56 X10(3) UL (ref 0.1–1)
MONOCYTES NFR BLD AUTO: 6.6 %
NEUTROPHILS # BLD AUTO: 4.39 X10 (3) UL (ref 1.5–7.7)
NEUTROPHILS # BLD AUTO: 4.39 X10(3) UL (ref 1.5–7.7)
NEUTROPHILS NFR BLD AUTO: 52.1 %
OSMOLALITY SERPL CALC.SUM OF ELEC: 295 MOSM/KG (ref 275–295)
PATIENT FASTING Y/N/NP: NO
PLATELET # BLD AUTO: 225 10(3)UL (ref 150–450)
POTASSIUM SERPL-SCNC: 3.8 MMOL/L (ref 3.5–5.1)
RBC # BLD AUTO: 4.98 X10(6)UL (ref 3.8–5.3)
SODIUM SERPL-SCNC: 142 MMOL/L (ref 136–145)
WBC # BLD AUTO: 8.4 X10(3) UL (ref 4–11)

## 2020-08-24 PROCEDURE — 36415 COLL VENOUS BLD VENIPUNCTURE: CPT

## 2020-08-24 PROCEDURE — 85025 COMPLETE CBC W/AUTO DIFF WBC: CPT

## 2020-08-24 PROCEDURE — 80053 COMPREHEN METABOLIC PANEL: CPT

## 2020-08-24 PROCEDURE — 83615 LACTATE (LD) (LDH) ENZYME: CPT

## 2020-08-26 DIAGNOSIS — E78.00 PURE HYPERCHOLESTEROLEMIA: Primary | ICD-10-CM

## 2020-08-27 ENCOUNTER — OFFICE VISIT (OUTPATIENT)
Dept: HEMATOLOGY/ONCOLOGY | Facility: HOSPITAL | Age: 50
End: 2020-08-27
Attending: INTERNAL MEDICINE
Payer: COMMERCIAL

## 2020-08-27 VITALS
TEMPERATURE: 98 F | BODY MASS INDEX: 40.5 KG/M2 | OXYGEN SATURATION: 97 % | HEART RATE: 109 BPM | DIASTOLIC BLOOD PRESSURE: 89 MMHG | RESPIRATION RATE: 16 BRPM | WEIGHT: 252 LBS | HEIGHT: 66 IN | SYSTOLIC BLOOD PRESSURE: 137 MMHG

## 2020-08-27 DIAGNOSIS — Z85.72 ENCOUNTER FOR FOLLOW-UP SURVEILLANCE OF LYMPHOMA: Primary | ICD-10-CM

## 2020-08-27 DIAGNOSIS — Z08 ENCOUNTER FOR FOLLOW-UP SURVEILLANCE OF LYMPHOMA: Primary | ICD-10-CM

## 2020-08-27 PROCEDURE — 99214 OFFICE O/P EST MOD 30 MIN: CPT | Performed by: INTERNAL MEDICINE

## 2020-08-27 NOTE — PROGRESS NOTES
Name: Tasia Mendez  MRN: [id number]  YOB: 1970  CSN: [id number]  Date of Visit: 8/27/2020      Chief Complaint: ALCL; ALK negative, stage III      HPI:  Ms. Favian Garcia is seen in the office for follow up regarding ALK negative ALCL.   Brigette morning with elevation of her legs    Review of Systems:     Review of Systems   Constitutional: Negative for activity change, appetite change, chills, diaphoresis, fatigue, fever and unexpected weight change.    HENT: Negative for congestion, ear pain, nos History of blood transfusion    • Hypothyroidism     Hypothyroidism, primary   • Other malignant lymphomas of intra-abdominal lymph nodes 05/15/2014   • Rheumatoid arthritis ([facility] [address]    • Sleep apnea    • Swelling of limb 05/20/2014     Past Surgical History: Attends meetings of clubs or organizations: Not on file        Relationship status: Not on file      Intimate partner violence:        Fear of current or ex partner: Not on file        Emotionally abused: Not on file        Physically abused: Not on file distress. HENT:   Head: Normocephalic and atraumatic. Nose: Nose normal.   Mouth/Throat: Oropharynx is clear and moist. No oropharyngeal exudate. Eyes: Pupils are equal, round, and reactive to light.  Conjunctivae and EOM are normal.   Neck: Normal ra OSMOCALC 295 08/24/2020    ALKPHO 87 08/24/2020    AST 22 08/24/2020    ALT 30 08/24/2020    ALKPHOS 77 08/02/2016    BILT 0.3 08/24/2020    TP 7.3 08/24/2020    ALB 3.7 08/24/2020    GLOBULIN 3.6 08/24/2020    AGRATIO 1.2 08/02/2016     08/24/202

## 2020-08-27 NOTE — TELEPHONE ENCOUNTER
Last seen 2/25/2020. Return to clinic in 6 months (8/2020) No follow up scheduled. Refill(s) pended and routed to Dr. Luis Navarro.

## 2020-08-28 RX ORDER — LEVOTHYROXINE SODIUM 0.1 MG/1
TABLET ORAL
Qty: 30 TABLET | Refills: 1 | Status: SHIPPED | OUTPATIENT
Start: 2020-08-28 | End: 2020-10-30

## 2020-08-31 NOTE — TELEPHONE ENCOUNTER
8/31 LMTCB. Patient needs 12 hour fasting labs, urinalysis and an appointment . PSA please schedule.

## 2020-09-16 RX ORDER — ROSUVASTATIN CALCIUM 40 MG/1
TABLET, COATED ORAL
Qty: 30 TABLET | Refills: 0 | Status: SHIPPED | OUTPATIENT
Start: 2020-09-16 | End: 2020-12-23

## 2020-10-17 ENCOUNTER — LAB ENCOUNTER (OUTPATIENT)
Dept: LAB | Age: 50
End: 2020-10-17
Attending: INTERNAL MEDICINE
Payer: COMMERCIAL

## 2020-10-17 DIAGNOSIS — E78.00 PURE HYPERCHOLESTEROLEMIA: ICD-10-CM

## 2020-10-17 PROCEDURE — 36415 COLL VENOUS BLD VENIPUNCTURE: CPT

## 2020-10-17 PROCEDURE — 81001 URINALYSIS AUTO W/SCOPE: CPT

## 2020-10-17 PROCEDURE — 80061 LIPID PANEL: CPT

## 2020-10-20 ENCOUNTER — OFFICE VISIT (OUTPATIENT)
Dept: NEPHROLOGY | Facility: CLINIC | Age: 50
End: 2020-10-20
Payer: COMMERCIAL

## 2020-10-20 VITALS
HEART RATE: 78 BPM | DIASTOLIC BLOOD PRESSURE: 69 MMHG | WEIGHT: 253.63 LBS | BODY MASS INDEX: 40.76 KG/M2 | HEIGHT: 66 IN | TEMPERATURE: 97 F | SYSTOLIC BLOOD PRESSURE: 112 MMHG

## 2020-10-20 DIAGNOSIS — G47.30 SLEEP APNEA, UNSPECIFIED TYPE: ICD-10-CM

## 2020-10-20 DIAGNOSIS — E03.9 ACQUIRED HYPOTHYROIDISM: Primary | ICD-10-CM

## 2020-10-20 DIAGNOSIS — E78.00 HYPERCHOLESTEREMIA: ICD-10-CM

## 2020-10-20 PROCEDURE — 3074F SYST BP LT 130 MM HG: CPT | Performed by: INTERNAL MEDICINE

## 2020-10-20 PROCEDURE — 3078F DIAST BP <80 MM HG: CPT | Performed by: INTERNAL MEDICINE

## 2020-10-20 PROCEDURE — 3008F BODY MASS INDEX DOCD: CPT | Performed by: INTERNAL MEDICINE

## 2020-10-20 PROCEDURE — 99214 OFFICE O/P EST MOD 30 MIN: CPT | Performed by: INTERNAL MEDICINE

## 2020-10-20 NOTE — PROGRESS NOTES
[facility]  Nephrology Daily Progress Note    Carissa Rhodes  [id number]  44year old  Patient presents with:  Test Results      HPI:   Carissa Rhodes is a 44year old female.   61-year-old female with a history of allergic rhinitis, partially maria antonia Height 66 - 66   BODY MASS INDEX - 40.93 -       VITALS: WEIGHT ONLY 2/27/2020 8/27/2020 10/20/2020   Weight 261 lbs 252 lbs 253 lbs 10 oz       Constitutional: appears well hydrated alert and responsive no acute distress noted  Neck/Thyroid: neck is sup Comment:Other reaction(s): GI problems         ASSESSMENT/PLAN:   Assessment   Acquired hypothyroidism  (primary encounter diagnosis)  Sleep apnea, unspecified type  Hypercholesteremia    Patient Active Problem List:     Anaplastic large cell lymphom

## 2020-10-30 RX ORDER — LEVOTHYROXINE SODIUM 0.1 MG/1
TABLET ORAL
Qty: 30 TABLET | Refills: 5 | Status: SHIPPED | OUTPATIENT
Start: 2020-10-30 | End: 2021-03-01

## 2020-12-12 ENCOUNTER — LAB ENCOUNTER (OUTPATIENT)
Dept: LAB | Age: 50
End: 2020-12-12
Attending: INTERNAL MEDICINE
Payer: COMMERCIAL

## 2020-12-12 DIAGNOSIS — Z20.822 EXPOSURE TO COVID-19 VIRUS: ICD-10-CM

## 2020-12-23 RX ORDER — ROSUVASTATIN CALCIUM 40 MG/1
TABLET, COATED ORAL
Qty: 30 TABLET | Refills: 2 | Status: SHIPPED | OUTPATIENT
Start: 2020-12-23 | End: 2021-04-16

## 2021-01-01 ENCOUNTER — PATIENT MESSAGE (OUTPATIENT)
Dept: OBGYN CLINIC | Facility: CLINIC | Age: 51
End: 2021-01-01

## 2021-01-02 NOTE — TELEPHONE ENCOUNTER
From: Domingo Thompson  To: Jessica Taveras MD  Sent: 1/1/2021 5:51 PM CST  Subject: Non-Urgent Medical Question    I am requesting an order for a mammogram. I received a letter reminding me to schedule.       Stay safe

## 2021-02-01 ENCOUNTER — TELEPHONE (OUTPATIENT)
Dept: NEPHROLOGY | Facility: CLINIC | Age: 51
End: 2021-02-01

## 2021-02-06 DIAGNOSIS — Z23 NEED FOR VACCINATION: ICD-10-CM

## 2021-02-10 ENCOUNTER — IMMUNIZATION (OUTPATIENT)
Dept: LAB | Age: 51
End: 2021-02-10
Attending: HOSPITALIST
Payer: COMMERCIAL

## 2021-02-10 DIAGNOSIS — Z23 NEED FOR VACCINATION: Primary | ICD-10-CM

## 2021-02-10 PROCEDURE — 0001A SARSCOV2 VAC 30MCG/0.3ML IM: CPT

## 2021-03-01 ENCOUNTER — OFFICE VISIT (OUTPATIENT)
Dept: OBGYN CLINIC | Facility: CLINIC | Age: 51
End: 2021-03-01
Payer: COMMERCIAL

## 2021-03-01 VITALS
SYSTOLIC BLOOD PRESSURE: 129 MMHG | WEIGHT: 251 LBS | DIASTOLIC BLOOD PRESSURE: 88 MMHG | HEART RATE: 89 BPM | BODY MASS INDEX: 41 KG/M2

## 2021-03-01 DIAGNOSIS — Z12.31 ENCOUNTER FOR SCREENING MAMMOGRAM FOR BREAST CANCER: ICD-10-CM

## 2021-03-01 DIAGNOSIS — Z01.419 ENCOUNTER FOR GYNECOLOGICAL EXAMINATION: Primary | ICD-10-CM

## 2021-03-01 DIAGNOSIS — Z12.4 SCREENING FOR MALIGNANT NEOPLASM OF CERVIX: ICD-10-CM

## 2021-03-01 PROCEDURE — 3079F DIAST BP 80-89 MM HG: CPT | Performed by: OBSTETRICS & GYNECOLOGY

## 2021-03-01 PROCEDURE — 99396 PREV VISIT EST AGE 40-64: CPT | Performed by: OBSTETRICS & GYNECOLOGY

## 2021-03-01 PROCEDURE — 3074F SYST BP LT 130 MM HG: CPT | Performed by: OBSTETRICS & GYNECOLOGY

## 2021-03-01 RX ORDER — LEVOTHYROXINE SODIUM 0.1 MG/1
TABLET ORAL
Qty: 30 TABLET | Refills: 2 | Status: SHIPPED | OUTPATIENT
Start: 2021-03-01 | End: 2021-06-04

## 2021-03-01 NOTE — TELEPHONE ENCOUNTER
rx request for levothyroxine Sodium 100 mcg please review and sign off if appropriate. Thank you.     Last office visit: 10/20/20  RTC: 6 months  Last Refill: 10/30/20

## 2021-03-01 NOTE — PROGRESS NOTES
Kathia Carpenter is a 44year old female  No LMP recorded. (Menstrual status: Chemo). here for annual exam.       Last seen 10/29/2018. Last pap 10/2018 normal with neg HPV.       History of non Hodgkin lymphoma    Unable to adopt foster child due • Diabetes Father    • Heart Disease Father    • Other (Other) Father    • Thyroid disease Sister    • Other (lupus) Sister    • Other (sjogren's) Sister    • Heart Disease Other         Grandparents   • Other (Other) Mother         Rheumatoid Arthritis nodules  Heart:  Regular rate and rhythm  Lungs:  Clear to asculation  Breast: normal without palpable masses, tenderness, asymmetry, nipple discharge, nipple retraction or skin changes  Abdomen:  soft, nontender, nondistended, no masses  Psychiatric:  Audie

## 2021-03-02 LAB — HPV I/H RISK 1 DNA SPEC QL NAA+PROBE: NEGATIVE

## 2021-03-03 ENCOUNTER — IMMUNIZATION (OUTPATIENT)
Dept: LAB | Age: 51
End: 2021-03-03
Attending: HOSPITALIST
Payer: COMMERCIAL

## 2021-03-03 DIAGNOSIS — Z23 NEED FOR VACCINATION: Primary | ICD-10-CM

## 2021-03-03 PROCEDURE — 0002A SARSCOV2 VAC 30MCG/0.3ML IM: CPT

## 2021-03-11 ENCOUNTER — HOSPITAL ENCOUNTER (OUTPATIENT)
Dept: MAMMOGRAPHY | Age: 51
Discharge: HOME | End: 2021-03-11
Attending: OBSTETRICS & GYNECOLOGY
Payer: COMMERCIAL

## 2021-03-11 DIAGNOSIS — Z12.31 ENCOUNTER FOR SCREENING MAMMOGRAM FOR BREAST CANCER: ICD-10-CM

## 2021-03-11 PROCEDURE — 77063 BREAST TOMOSYNTHESIS BI: CPT | Performed by: OBSTETRICS & GYNECOLOGY

## 2021-03-11 PROCEDURE — 77067 SCR MAMMO BI INCL CAD: CPT | Performed by: OBSTETRICS & GYNECOLOGY

## 2021-03-12 ENCOUNTER — OFFICE VISIT (OUTPATIENT)
Dept: NEPHROLOGY | Facility: CLINIC | Age: 51
End: 2021-03-12
Payer: COMMERCIAL

## 2021-03-12 ENCOUNTER — HOSPITAL ENCOUNTER (OUTPATIENT)
Dept: GENERAL RADIOLOGY | Facility: HOSPITAL | Age: 51
Discharge: HOME | End: 2021-03-12
Attending: INTERNAL MEDICINE
Payer: COMMERCIAL

## 2021-03-12 ENCOUNTER — LAB ENCOUNTER (OUTPATIENT)
Dept: LAB | Facility: HOSPITAL | Age: 51
End: 2021-03-12
Attending: INTERNAL MEDICINE
Payer: COMMERCIAL

## 2021-03-12 VITALS
HEIGHT: 66 IN | BODY MASS INDEX: 40.63 KG/M2 | HEART RATE: 83 BPM | SYSTOLIC BLOOD PRESSURE: 113 MMHG | WEIGHT: 252.81 LBS | RESPIRATION RATE: 18 BRPM | DIASTOLIC BLOOD PRESSURE: 81 MMHG

## 2021-03-12 DIAGNOSIS — M19.90 ARTHRITIS: ICD-10-CM

## 2021-03-12 DIAGNOSIS — E78.00 HYPERCHOLESTEREMIA: ICD-10-CM

## 2021-03-12 DIAGNOSIS — E03.9 ACQUIRED HYPOTHYROIDISM: ICD-10-CM

## 2021-03-12 DIAGNOSIS — M19.90 ARTHRITIS: Primary | ICD-10-CM

## 2021-03-12 LAB
CRP SERPL-MCNC: <0.29 MG/DL (ref ?–0.3)
ERYTHROCYTE [SEDIMENTATION RATE] IN BLOOD: 8 MM/HR
RHEUMATOID FACT SERPL-ACNC: <10 IU/ML (ref ?–15)
URATE SERPL-MCNC: 6.1 MG/DL

## 2021-03-12 PROCEDURE — 86140 C-REACTIVE PROTEIN: CPT

## 2021-03-12 PROCEDURE — 86431 RHEUMATOID FACTOR QUANT: CPT

## 2021-03-12 PROCEDURE — 3079F DIAST BP 80-89 MM HG: CPT | Performed by: INTERNAL MEDICINE

## 2021-03-12 PROCEDURE — 3074F SYST BP LT 130 MM HG: CPT | Performed by: INTERNAL MEDICINE

## 2021-03-12 PROCEDURE — 84550 ASSAY OF BLOOD/URIC ACID: CPT

## 2021-03-12 PROCEDURE — 73130 X-RAY EXAM OF HAND: CPT | Performed by: INTERNAL MEDICINE

## 2021-03-12 PROCEDURE — 86039 ANTINUCLEAR ANTIBODIES (ANA): CPT

## 2021-03-12 PROCEDURE — 85652 RBC SED RATE AUTOMATED: CPT

## 2021-03-12 PROCEDURE — 86235 NUCLEAR ANTIGEN ANTIBODY: CPT

## 2021-03-12 PROCEDURE — 36415 COLL VENOUS BLD VENIPUNCTURE: CPT

## 2021-03-12 PROCEDURE — 3008F BODY MASS INDEX DOCD: CPT | Performed by: INTERNAL MEDICINE

## 2021-03-12 PROCEDURE — 86038 ANTINUCLEAR ANTIBODIES: CPT

## 2021-03-12 PROCEDURE — 86225 DNA ANTIBODY NATIVE: CPT

## 2021-03-12 PROCEDURE — 99214 OFFICE O/P EST MOD 30 MIN: CPT | Performed by: INTERNAL MEDICINE

## 2021-03-12 PROCEDURE — 86200 CCP ANTIBODY: CPT

## 2021-03-13 NOTE — PROGRESS NOTES
[facility], [facility]  Nephrology Daily Progress Note    Varun Meza  [id number]  44year old  Patient presents with:  Hand Pain: c/o of right hand pain on and off x '\"years\" but worse in the last 3 wks. Fatigue: increased in the last 3 wks.        HPI: SpO2 - - -   Weight 251 lbs - 252 lbs 13 oz   Height - - 66   BODY MASS INDEX - 40.8 -       VITALS: WEIGHT ONLY 10/20/2020 3/1/2021 3/12/2021   Weight 253 lbs 10 oz 251 lbs 252 lbs 13 oz       Constitutional: appears well hydrated alert and responsive n are encouraged to contact our breast navigator, Raymundo Raymond, at [phone] with any questions or for guidance regarding this recommendation. PLEASE NOTE: NORMAL MAMMOGRAM DOES NOT EXCLUDE THE POSSIBILITY OF BREAST CANCER.   A CLINICALLY SUSPICIOUS arthritis. The patient will be sent for x-rays along with a sed rate, rheumatoid factor, CCP, PAUL, C-reactive protein and uric acid level. She will then see Dr. Philip Fernandez for follow-up.          Orders Placed This Encounter      Sed Rate, Leonardo Watson

## 2021-03-15 LAB — NUCLEAR IGG TITR SER IF: POSITIVE {TITER}

## 2021-03-16 LAB
CCP IGG SERPL-ACNC: 1 U/ML (ref 0–6.9)
DSDNA AB TITR SER: <10 {TITER}
ENA SM IGG SER QL: NEGATIVE
ENA SM+RNP AB SER QL: NEGATIVE
ENA SS-A AB SER QL IA: NEGATIVE
ENA SS-B AB SER QL IA: NEGATIVE

## 2021-03-17 LAB — ANA NUCLEOLAR TITR SER IF: 640 {TITER}

## 2021-03-29 ENCOUNTER — OFFICE VISIT (OUTPATIENT)
Dept: RHEUMATOLOGY | Facility: CLINIC | Age: 51
End: 2021-03-29
Payer: COMMERCIAL

## 2021-03-29 VITALS
WEIGHT: 249 LBS | DIASTOLIC BLOOD PRESSURE: 79 MMHG | SYSTOLIC BLOOD PRESSURE: 124 MMHG | HEIGHT: 66 IN | HEART RATE: 71 BPM | BODY MASS INDEX: 40.02 KG/M2 | RESPIRATION RATE: 16 BRPM

## 2021-03-29 DIAGNOSIS — M06.9 RHEUMATOID ARTHRITIS, INVOLVING UNSPECIFIED SITE, UNSPECIFIED WHETHER RHEUMATOID FACTOR PRESENT (HCC): Primary | ICD-10-CM

## 2021-03-29 DIAGNOSIS — M79.641 RIGHT HAND PAIN: ICD-10-CM

## 2021-03-29 PROCEDURE — 99244 OFF/OP CNSLTJ NEW/EST MOD 40: CPT | Performed by: INTERNAL MEDICINE

## 2021-03-29 PROCEDURE — 3078F DIAST BP <80 MM HG: CPT | Performed by: INTERNAL MEDICINE

## 2021-03-29 PROCEDURE — 3074F SYST BP LT 130 MM HG: CPT | Performed by: INTERNAL MEDICINE

## 2021-03-29 PROCEDURE — 3008F BODY MASS INDEX DOCD: CPT | Performed by: INTERNAL MEDICINE

## 2021-03-29 RX ORDER — HYDROXYCHLOROQUINE SULFATE 200 MG/1
200 TABLET, FILM COATED ORAL 2 TIMES DAILY
Qty: 60 TABLET | Refills: 1 | Status: SHIPPED | OUTPATIENT
Start: 2021-03-29 | End: 2021-06-23 | Stop reason: ALTCHOICE

## 2021-03-29 NOTE — PROGRESS NOTES
Micha Carbajal is a 46year old female who presents for Patient presents with:  Consult: Positive PAUL, Arthritis, Dermatomyositis sine myositis  Hand Pain  . HPI:     I had the pleasure of seeing Micha Carbajal on 3/29/2021 for evaluation.  She was re an ace bandage at night helps. Her right hand feels like her hx of RA in the past.   She never had any sympotms of RA since her tranpslant until now. Her ankles swell a little bit. No rash, and no weakness assciated with this right hand pain. Sister    • Heart Disease Other         Grandparents   • Other (Other) Mother         Rheumatoid Arthritis   • Breast Cancer Sister 52   • Other (Other) Brother         Hemochromatosis   • Melanoma Maternal Grandfather    • Breast Cancer Paternal Aunt 72 other joint tenderness  EXTREMITIES: no cyanosis, clubbing or edema  NEURO: intact touch, 5/5 ue and le strength    Component      Latest Ref Rng & Units 3/12/2021   PAUL Titer/Pattern      <80 640 (A)   Reviewed By:       Carola Corona M.D.    Anti-Sjogre at [facility]. 4. Dry eyes- f/u with dr. Bennie Flores [facility] dr. Rajani Green for plauqenil as well -   5. Hx of steroid induced diabetes     Summary:  1. Trial of hydroxychlroquine 200mg twice a week   2. Ok to take aleve as needed   3.  Return to

## 2021-03-29 NOTE — PATIENT INSTRUCTIONS
1. Trial of hydroxychlroquine 200mg twice a week   2. Ok to take aleve as needed   3. Return to clinic in 6-8 weeks. Text SUPPORT1 to 86358 to learn if you may be eligible for financial support with your medication(s). Msg & Data Rates May Apply.  Msg liver injury like dark yellow or brown urine; general ill feeling or flu-like symptoms; light-colored stools; loss of appetite; nausea; right upper belly pain; unusually weak or tired; yellowing of the eyes or skin  · signs and symptoms of low blood sugar deficiency  · heart disease  · history of irregular heartbeat  · if you often drink alcohol  · kidney disease  · liver disease  · porphyria  · psoriasis  · an unusual or allergic reaction to chloroquine, hydroxychloroquine, other medicines, foods, dyes, or

## 2021-04-16 DIAGNOSIS — Z00.00 HEALTH MAINTENANCE EXAMINATION: ICD-10-CM

## 2021-04-16 DIAGNOSIS — E03.9 ACQUIRED HYPOTHYROIDISM: ICD-10-CM

## 2021-04-16 DIAGNOSIS — E78.00 PURE HYPERCHOLESTEROLEMIA: Primary | ICD-10-CM

## 2021-04-16 RX ORDER — ROSUVASTATIN CALCIUM 40 MG/1
TABLET, COATED ORAL
Qty: 90 TABLET | Refills: 1 | Status: SHIPPED | OUTPATIENT
Start: 2021-04-16 | End: 2021-12-30

## 2021-05-27 ENCOUNTER — OFFICE VISIT (OUTPATIENT)
Dept: RHEUMATOLOGY | Facility: CLINIC | Age: 51
End: 2021-05-27
Payer: COMMERCIAL

## 2021-05-27 VITALS
HEART RATE: 85 BPM | SYSTOLIC BLOOD PRESSURE: 112 MMHG | WEIGHT: 245 LBS | BODY MASS INDEX: 39.37 KG/M2 | HEIGHT: 66 IN | DIASTOLIC BLOOD PRESSURE: 76 MMHG

## 2021-05-27 DIAGNOSIS — M06.9 RHEUMATOID ARTHRITIS, INVOLVING UNSPECIFIED SITE, UNSPECIFIED WHETHER RHEUMATOID FACTOR PRESENT (HCC): ICD-10-CM

## 2021-05-27 DIAGNOSIS — M79.641 RIGHT HAND PAIN: Primary | ICD-10-CM

## 2021-05-27 PROCEDURE — 20600 DRAIN/INJ JOINT/BURSA W/O US: CPT | Performed by: INTERNAL MEDICINE

## 2021-05-27 PROCEDURE — 3074F SYST BP LT 130 MM HG: CPT | Performed by: INTERNAL MEDICINE

## 2021-05-27 PROCEDURE — 99214 OFFICE O/P EST MOD 30 MIN: CPT | Performed by: INTERNAL MEDICINE

## 2021-05-27 PROCEDURE — 3008F BODY MASS INDEX DOCD: CPT | Performed by: INTERNAL MEDICINE

## 2021-05-27 PROCEDURE — 3078F DIAST BP <80 MM HG: CPT | Performed by: INTERNAL MEDICINE

## 2021-05-27 RX ORDER — METHYLPREDNISOLONE ACETATE 80 MG/ML
20 INJECTION, SUSPENSION INTRA-ARTICULAR; INTRALESIONAL; INTRAMUSCULAR; SOFT TISSUE ONCE
Status: COMPLETED | OUTPATIENT
Start: 2021-05-27 | End: 2021-05-27

## 2021-05-27 RX ADMIN — METHYLPREDNISOLONE ACETATE 20 MG: 80 INJECTION, SUSPENSION INTRA-ARTICULAR; INTRALESIONAL; INTRAMUSCULAR; SOFT TISSUE at 11:00:00

## 2021-05-27 NOTE — PROGRESS NOTES
Danay Melton is a 46year old female who presents for Patient presents with:  Rheumatoid Arthritis  Hand Pain: right  . HPI:     I had the pleasure of seeing Danay Melton on 3/29/2021 for evaluation. She was referred by Dr. Feng Grimes.      She is a right hand feels like her hx of RA in the past.   She never had any sympotms of RA since her tranpslant until now. Her ankles swell a little bit. No rash, and no weakness assciated with this right hand pain.        5/27/2021  She had extreme fatigue and LYMPHOCYTE INFUSIONS  2014    stem cell transplant   • COLONOSCOPY N/A 2/18/2019    Procedure: COLONOSCOPY;  Surgeon: Wilbur Goldberg, MD;  Location: [address] [place] [address] ENDOSCOPY   • HERNIA SURGERY  00/5788    Umbilical hernia repair   • FUAD BIOPSY STEREO NODULE 1 S tingling, no headache, no hx of seizures,   Psych: no hx of anxiety or depression  ENDO: no hx of thyroid disease, no hx of DM  Joint/Muscluskeltal: see HPI,   All other ROS are negative.      EXAM:   /76 (BP Location: Right arm, Patient Position: Sit Patient presents with:  Rheumatoid Arthritis  Hand Pain: right      1.  Right hand pain and swelling likely RA - she has a hx of this since age 14 - could be a recurrence of her RA , positive PAUL 1:640 homogenousbut no other labs positive for lupus -   - sh

## 2021-05-27 NOTE — PROCEDURES
With  consent, I injected the patient's  Right 3rd mcp with 0.25ml lidocaine  1% and 0.25ml depo 80. It was under sterile technique using iodine and alcohol swabs and ethyl chloride was used as an anaesthetic spray. Pt.  tolerated it well.

## 2021-05-27 NOTE — PATIENT INSTRUCTIONS
1. Rest right 3rd mcp x 3 days -   2. Ok to take aleve as needed   3. Return to clinic 4-6 months. Margarito Salazar

## 2021-06-03 ENCOUNTER — LAB ENCOUNTER (OUTPATIENT)
Dept: LAB | Age: 51
End: 2021-06-03
Attending: INTERNAL MEDICINE
Payer: COMMERCIAL

## 2021-06-03 DIAGNOSIS — E03.9 ACQUIRED HYPOTHYROIDISM: ICD-10-CM

## 2021-06-03 DIAGNOSIS — E78.00 PURE HYPERCHOLESTEROLEMIA: ICD-10-CM

## 2021-06-03 DIAGNOSIS — Z00.00 HEALTH MAINTENANCE EXAMINATION: ICD-10-CM

## 2021-06-03 PROCEDURE — 80053 COMPREHEN METABOLIC PANEL: CPT

## 2021-06-03 PROCEDURE — 80061 LIPID PANEL: CPT

## 2021-06-03 PROCEDURE — 84443 ASSAY THYROID STIM HORMONE: CPT

## 2021-06-03 PROCEDURE — 36415 COLL VENOUS BLD VENIPUNCTURE: CPT

## 2021-06-04 ENCOUNTER — OFFICE VISIT (OUTPATIENT)
Dept: DERMATOLOGY CLINIC | Facility: CLINIC | Age: 51
End: 2021-06-04
Payer: COMMERCIAL

## 2021-06-04 DIAGNOSIS — L30.9 DERMATITIS: Primary | ICD-10-CM

## 2021-06-04 PROCEDURE — 99203 OFFICE O/P NEW LOW 30 MIN: CPT | Performed by: PHYSICIAN ASSISTANT

## 2021-06-04 RX ORDER — LEVOCETIRIZINE DIHYDROCHLORIDE 5 MG/1
5 TABLET, FILM COATED ORAL DAILY
Qty: 30 TABLET | Refills: 0 | Status: SHIPPED | OUTPATIENT
Start: 2021-06-04 | End: 2021-10-12

## 2021-06-04 RX ORDER — LEVOTHYROXINE SODIUM 0.1 MG/1
TABLET ORAL
Qty: 30 TABLET | Refills: 2 | Status: SHIPPED | OUTPATIENT
Start: 2021-06-04 | End: 2021-10-06

## 2021-06-04 RX ORDER — MOMETASONE FUROATE 1 MG/G
1 OINTMENT TOPICAL DAILY
Qty: 30 G | Refills: 1 | Status: SHIPPED | OUTPATIENT
Start: 2021-06-04 | End: 2021-10-12 | Stop reason: ALTCHOICE

## 2021-06-04 NOTE — PROGRESS NOTES
HPI:    Patient ID: Melanie Arenas is a 46year old female. Patient presents with itchy rash on chest. She was in the sun for 3 days straight and developed a sun burn. She then noticed the rash in center of chest. No pain. No draining.  Has used hydroc for the next 2 weeks.   -To take xyzal nightly for itching as well. -Return in 2 weeks if no improvement.   -To call or follow-up with worsening symptoms or concerns.   -Pt was agreeable to plan and will comply with discussion above.        No orders of t

## 2021-06-06 ENCOUNTER — TELEPHONE (OUTPATIENT)
Dept: NEPHROLOGY | Facility: CLINIC | Age: 51
End: 2021-06-06

## 2021-06-06 DIAGNOSIS — E78.00 PURE HYPERCHOLESTEROLEMIA: Primary | ICD-10-CM

## 2021-06-07 NOTE — TELEPHONE ENCOUNTER
Patient contacted. Dr. Bettie Mann advice relayed. Patient will get lab work again in 2 months (Lipid panel) Orders entered in system.

## 2021-06-07 NOTE — TELEPHONE ENCOUNTER
Patient returned call. She states that she is taking Crestor every day as prescribed. Encounter routed to Dr. Tr Shah.

## 2021-06-07 NOTE — TELEPHONE ENCOUNTER
There was a major increase and she is on max dose of crestor.   Watch diet and exercise carefully and repeat lipid rosaura in 2 months

## 2021-06-16 ENCOUNTER — PATIENT MESSAGE (OUTPATIENT)
Dept: RHEUMATOLOGY | Facility: CLINIC | Age: 51
End: 2021-06-16

## 2021-06-16 NOTE — TELEPHONE ENCOUNTER
From: Jamie Hughes  To: Zurdo Scott MD  Sent: 6/16/2021 8:27 AM CDT  Subject: Visit Follow-up Question    Good Morning     The cortisone shot in my hand has given me a great deal of relief, is it still possible to schedule a shot for my ankle?

## 2021-06-17 NOTE — TELEPHONE ENCOUNTER
Patient has scheduled injection  Future Appointments   Date Time Provider Michele Borja   6/23/2021  9:20 AM Lina Fisher MD 2014 [facility]   7/15/2021 10:00 AM Lina Fisher MD [facility] [place]

## 2021-06-23 ENCOUNTER — OFFICE VISIT (OUTPATIENT)
Dept: RHEUMATOLOGY | Facility: CLINIC | Age: 51
End: 2021-06-23
Payer: COMMERCIAL

## 2021-06-23 VITALS
HEIGHT: 66 IN | HEART RATE: 72 BPM | BODY MASS INDEX: 39.21 KG/M2 | WEIGHT: 244 LBS | DIASTOLIC BLOOD PRESSURE: 73 MMHG | RESPIRATION RATE: 16 BRPM | SYSTOLIC BLOOD PRESSURE: 103 MMHG

## 2021-06-23 DIAGNOSIS — G89.29 CHRONIC PAIN OF LEFT ANKLE: ICD-10-CM

## 2021-06-23 DIAGNOSIS — M25.572 CHRONIC PAIN OF LEFT ANKLE: ICD-10-CM

## 2021-06-23 DIAGNOSIS — M06.9 RHEUMATOID ARTHRITIS, INVOLVING UNSPECIFIED SITE, UNSPECIFIED WHETHER RHEUMATOID FACTOR PRESENT (HCC): Primary | ICD-10-CM

## 2021-06-23 PROCEDURE — 3078F DIAST BP <80 MM HG: CPT | Performed by: INTERNAL MEDICINE

## 2021-06-23 PROCEDURE — 99213 OFFICE O/P EST LOW 20 MIN: CPT | Performed by: INTERNAL MEDICINE

## 2021-06-23 PROCEDURE — 3008F BODY MASS INDEX DOCD: CPT | Performed by: INTERNAL MEDICINE

## 2021-06-23 PROCEDURE — 3074F SYST BP LT 130 MM HG: CPT | Performed by: INTERNAL MEDICINE

## 2021-06-23 PROCEDURE — 20605 DRAIN/INJ JOINT/BURSA W/O US: CPT | Performed by: INTERNAL MEDICINE

## 2021-06-23 RX ORDER — TRIAMCINOLONE ACETONIDE 40 MG/ML
20 INJECTION, SUSPENSION INTRA-ARTICULAR; INTRAMUSCULAR ONCE
Status: COMPLETED | OUTPATIENT
Start: 2021-06-23 | End: 2021-06-23

## 2021-06-23 RX ADMIN — TRIAMCINOLONE ACETONIDE 20 MG: 40 INJECTION, SUSPENSION INTRA-ARTICULAR; INTRAMUSCULAR at 09:58:00

## 2021-06-23 NOTE — PROGRESS NOTES
Kendy Lezama is a 46year old female who presents for Patient presents with: Foot Pain: left  Medication Follow-Up  . HPI:     I had the pleasure of seeing Kendy Lezama on 3/29/2021 for evaluation. She was referred by Dr. Alpesh Mcdowell.      She is a p right hand feels like her hx of RA in the past.   She never had any sympotms of RA since her tranpslant until now. Her ankles swell a little bit. No rash, and no weakness assciated with this right hand pain.        5/27/2021  She had extreme fatigue and • Rheumatoid arthritis ([facility] [address]    • Sleep apnea    • Swelling of limb 05/20/2014      Past Surgical History:   Procedure Laterality Date   • BONE MARROW ASPIRATE &BIOPSY  05/15/2014   • BONE MARROW/BLOOD-DERIVED PERIPHERAL STEM CELL TRANSPLANTAT; Koby Avilez SOB, no Cough, No Pleurtic pain,   GI: No nausea, no vomiiting, no abominal pain, no hx of ulcer, no gastritis, no heartburn, no dyshpagia, no BRBPR or melena  : no dysuria, no hx of miscarriages, no DVT Hx, no hx of OCP,   5 children  Neuro: No numbness calcification.  Correlate clinically. ASSESSMENT AND PLAN:   Micha Carbajal is a 46year old female who presents for Patient presents with: Foot Pain: left  Medication Follow-Up      1.  Right hand pain and swelling likely RA - she has a hx of this

## 2021-06-23 NOTE — PATIENT INSTRUCTIONS
1. Rest left ankle  x 3 days -   2. Ok to take aleve as needed   3. Return to clinic 4-6 months. Svetlana Camara

## 2021-06-23 NOTE — PROCEDURES
With  consent, I injected the patient's  Left ankle with 0.5ml lidocaine  1% and 0.5ml kenalog 40. It was under sterile technique using iodine and alcohol swabs and ethyl chloride was used as an anaesthetic spray. Pt.  tolerated it well.

## 2021-09-19 ENCOUNTER — IMMUNIZATION (OUTPATIENT)
Dept: LAB | Facility: HOSPITAL | Age: 51
End: 2021-09-19
Attending: EMERGENCY MEDICINE
Payer: COMMERCIAL

## 2021-09-19 DIAGNOSIS — Z23 NEED FOR VACCINATION: Primary | ICD-10-CM

## 2021-09-19 PROCEDURE — 0003A SARSCOV2 VAC 30MCG/0.3ML IM: CPT

## 2021-09-28 ENCOUNTER — TELEPHONE (OUTPATIENT)
Dept: HEMATOLOGY/ONCOLOGY | Facility: HOSPITAL | Age: 51
End: 2021-09-28

## 2021-09-29 ENCOUNTER — TELEPHONE (OUTPATIENT)
Dept: HEMATOLOGY/ONCOLOGY | Facility: HOSPITAL | Age: 51
End: 2021-09-29

## 2021-09-29 DIAGNOSIS — Z08 ENCOUNTER FOR FOLLOW-UP SURVEILLANCE OF LYMPHOMA: Primary | ICD-10-CM

## 2021-09-29 DIAGNOSIS — Z85.72 ENCOUNTER FOR FOLLOW-UP SURVEILLANCE OF LYMPHOMA: Primary | ICD-10-CM

## 2021-09-29 NOTE — TELEPHONE ENCOUNTER
Lawanda Lam called to make a follow up appointment with outpatient labs. There are no labs orders entered.  Could you please enter the labs so she can go to the walk in?

## 2021-10-06 RX ORDER — LEVOTHYROXINE SODIUM 0.1 MG/1
TABLET ORAL
Qty: 90 TABLET | Refills: 0 | Status: SHIPPED | OUTPATIENT
Start: 2021-10-06

## 2021-10-06 NOTE — TELEPHONE ENCOUNTER
Last seen 3/12/21. Return to clinic in 6 months (9/21) No follow up scheduled. Refill(s) pended and routed to Dr. Juanjose Betancourt.

## 2021-10-07 ENCOUNTER — LAB ENCOUNTER (OUTPATIENT)
Dept: LAB | Age: 51
End: 2021-10-07
Attending: INTERNAL MEDICINE
Payer: COMMERCIAL

## 2021-10-07 DIAGNOSIS — Z08 ENCOUNTER FOR FOLLOW-UP SURVEILLANCE OF LYMPHOMA: ICD-10-CM

## 2021-10-07 DIAGNOSIS — Z85.72 ENCOUNTER FOR FOLLOW-UP SURVEILLANCE OF LYMPHOMA: ICD-10-CM

## 2021-10-07 PROCEDURE — 85025 COMPLETE CBC W/AUTO DIFF WBC: CPT

## 2021-10-07 PROCEDURE — 36415 COLL VENOUS BLD VENIPUNCTURE: CPT

## 2021-10-07 PROCEDURE — 83615 LACTATE (LD) (LDH) ENZYME: CPT

## 2021-10-07 PROCEDURE — 80053 COMPREHEN METABOLIC PANEL: CPT

## 2021-10-09 ENCOUNTER — LAB ENCOUNTER (OUTPATIENT)
Dept: LAB | Age: 51
End: 2021-10-09
Attending: INTERNAL MEDICINE
Payer: COMMERCIAL

## 2021-10-09 DIAGNOSIS — E78.00 PURE HYPERCHOLESTEROLEMIA: ICD-10-CM

## 2021-10-09 PROCEDURE — 36415 COLL VENOUS BLD VENIPUNCTURE: CPT

## 2021-10-09 PROCEDURE — 80061 LIPID PANEL: CPT

## 2021-10-12 ENCOUNTER — OFFICE VISIT (OUTPATIENT)
Dept: HEMATOLOGY/ONCOLOGY | Facility: HOSPITAL | Age: 51
End: 2021-10-12
Attending: INTERNAL MEDICINE
Payer: COMMERCIAL

## 2021-10-12 VITALS
SYSTOLIC BLOOD PRESSURE: 108 MMHG | HEIGHT: 66 IN | WEIGHT: 250 LBS | TEMPERATURE: 98 F | BODY MASS INDEX: 40.18 KG/M2 | OXYGEN SATURATION: 98 % | DIASTOLIC BLOOD PRESSURE: 84 MMHG | RESPIRATION RATE: 16 BRPM | HEART RATE: 84 BPM

## 2021-10-12 DIAGNOSIS — Z08 ENCOUNTER FOR FOLLOW-UP SURVEILLANCE OF LYMPHOMA: Primary | ICD-10-CM

## 2021-10-12 DIAGNOSIS — Z85.72 ENCOUNTER FOR FOLLOW-UP SURVEILLANCE OF LYMPHOMA: Primary | ICD-10-CM

## 2021-10-12 DIAGNOSIS — Z94.84 HISTORY OF STEM CELL TRANSPLANT (HCC): ICD-10-CM

## 2021-10-12 PROCEDURE — 99214 OFFICE O/P EST MOD 30 MIN: CPT | Performed by: INTERNAL MEDICINE

## 2021-10-12 NOTE — PROGRESS NOTES
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 Cecilio Hurd is seen in the office for follow up regarding ALK negative ALCL.   Erik Negative for activity change, appetite change, chills, diaphoresis, fatigue, fever and unexpected weight change. HENT: Negative for congestion, ear pain, nosebleeds, postnasal drip, sore throat, trouble swallowing and voice change.     Eyes: Negative for Swelling of limb 05/20/2014     Past Surgical History:   Procedure Laterality Date   • BONE MARROW ASPIRATE &BIOPSY  05/15/2014   • BONE MARROW/BLOOD-DERIVED PERIPHERAL STEM CELL TRANSPLANTAT; ALLOGENEIC DONOR LYMPHOCYTE INFUSIONS  2014    stem cell transp local anesthetic: Not Asked    Social History Narrative      Not on file    Social Determinants of Health  Financial Resource Strain:       Difficulty of Paying Living Expenses: Not on file  Food Insecurity:       Worried About Running Out of Food in the L (Oral)   Resp 16   Ht 1.676 m (5' 6\")   Wt 113.4 kg (250 lb)   SpO2 98%   BMI 40.35 kg/m²     Physical Exam  Constitutional:       General: She is not in acute distress. Appearance: She is well-developed.    HENT:      Head: Normocephalic and atraumati normal.         Judgment: Judgment normal.       Lab Results   Component Value Date    WBC 7.2 10/07/2021    RBC 4.65 10/07/2021    HGB 14.1 10/07/2021    HCT 42.6 10/07/2021    MCV 91.6 10/07/2021    MCH 30.3 10/07/2021    MCHC 33.1 10/07/2021    RDW 13. 3 return to clinic sooner as clinically indicated. Patient is also following-up with [facility] where she was transplanted by Dr. Rachael Ruiz, who she sees once a year.      MDM: Moderate Risk    Cuauhtemoc Carpenter MD  [facility]

## 2021-11-11 ENCOUNTER — PATIENT MESSAGE (OUTPATIENT)
Dept: NEPHROLOGY | Facility: CLINIC | Age: 51
End: 2021-11-11

## 2021-11-11 DIAGNOSIS — R60.9 EDEMA, UNSPECIFIED TYPE: Primary | ICD-10-CM

## 2021-11-12 NOTE — TELEPHONE ENCOUNTER
From: Melanie Arenas  To: Dipti Bennett MD  Sent: 11/11/2021 4:06 PM CST  Subject: Leg swelling     Good afternoon     I am having leg swelling in my left leg at the end of each day.  I was looking for advice to keep the fluid down in my left leg

## 2021-12-02 ENCOUNTER — PATIENT MESSAGE (OUTPATIENT)
Dept: NEPHROLOGY | Facility: CLINIC | Age: 51
End: 2021-12-02

## 2021-12-02 ENCOUNTER — HOSPITAL ENCOUNTER (OUTPATIENT)
Dept: ULTRASOUND IMAGING | Facility: HOSPITAL | Age: 51
Discharge: HOME | End: 2021-12-02
Attending: INTERNAL MEDICINE
Payer: COMMERCIAL

## 2021-12-02 DIAGNOSIS — R60.9 EDEMA, UNSPECIFIED TYPE: ICD-10-CM

## 2021-12-02 PROCEDURE — 93971 EXTREMITY STUDY: CPT | Performed by: INTERNAL MEDICINE

## 2021-12-03 ENCOUNTER — OFFICE VISIT (OUTPATIENT)
Dept: NEPHROLOGY | Facility: CLINIC | Age: 51
End: 2021-12-03
Payer: COMMERCIAL

## 2021-12-03 ENCOUNTER — LAB ENCOUNTER (OUTPATIENT)
Dept: LAB | Facility: HOSPITAL | Age: 51
End: 2021-12-03
Attending: INTERNAL MEDICINE
Payer: COMMERCIAL

## 2021-12-03 VITALS
SYSTOLIC BLOOD PRESSURE: 115 MMHG | WEIGHT: 253 LBS | HEIGHT: 66 IN | DIASTOLIC BLOOD PRESSURE: 75 MMHG | HEART RATE: 91 BPM | BODY MASS INDEX: 40.66 KG/M2

## 2021-12-03 DIAGNOSIS — I82.432 ACUTE DEEP VEIN THROMBOSIS (DVT) OF POPLITEAL VEIN OF LEFT LOWER EXTREMITY (HCC): ICD-10-CM

## 2021-12-03 DIAGNOSIS — I82.432 ACUTE DEEP VEIN THROMBOSIS (DVT) OF POPLITEAL VEIN OF LEFT LOWER EXTREMITY (HCC): Primary | ICD-10-CM

## 2021-12-03 PROCEDURE — 99214 OFFICE O/P EST MOD 30 MIN: CPT | Performed by: INTERNAL MEDICINE

## 2021-12-03 PROCEDURE — 86147 CARDIOLIPIN ANTIBODY EA IG: CPT

## 2021-12-03 PROCEDURE — 86146 BETA-2 GLYCOPROTEIN ANTIBODY: CPT

## 2021-12-03 PROCEDURE — 85610 PROTHROMBIN TIME: CPT

## 2021-12-03 PROCEDURE — 3078F DIAST BP <80 MM HG: CPT | Performed by: INTERNAL MEDICINE

## 2021-12-03 PROCEDURE — 85390 FIBRINOLYSINS SCREEN I&R: CPT

## 2021-12-03 PROCEDURE — 85598 HEXAGNAL PHOSPH PLTLT NEUTRL: CPT

## 2021-12-03 PROCEDURE — 3008F BODY MASS INDEX DOCD: CPT | Performed by: INTERNAL MEDICINE

## 2021-12-03 PROCEDURE — 85613 RUSSELL VIPER VENOM DILUTED: CPT

## 2021-12-03 PROCEDURE — 85730 THROMBOPLASTIN TIME PARTIAL: CPT

## 2021-12-03 PROCEDURE — 36415 COLL VENOUS BLD VENIPUNCTURE: CPT

## 2021-12-03 PROCEDURE — 3074F SYST BP LT 130 MM HG: CPT | Performed by: INTERNAL MEDICINE

## 2021-12-03 NOTE — TELEPHONE ENCOUNTER
From: Fitz Fry  Sent: 12/2/2021 6:44 PM CST  To: [facility] Clinical Staff  Subject: Leg swelling     Good morning     I just had my ultrasound. They found something behind my knee. They said I should contact Dr Arnold Claros to follow up right away.

## 2021-12-04 NOTE — PROGRESS NOTES
[facility], [facility]  Nephrology Daily Progress Note    Bhavna Barrett  [id number]  46year old  Patient presents with: Follow - Up: Follow up regular check up u/s done yesterday      HPI:   Bhavna Barrett is a 46year old female.   68-year-old female with easy bleeding and easy bruising  Integumentary:  Negative for pruritus and rash  Musculoskeletal:  Negative for bone/joint symptoms  Neurological:  Negative for gait disturbance  Psychiatric:  Negative for inappropriate interaction and psychiatric symptoms 8. 6   ALB 3.6   BUNCREA 20.5*   ANIONGAP 3   OSMOCALC 297*   GFRNAA 87   GFRAA 100       Imaging  US VENOUS DOPPLER LEG LEFT - DIAG IMG ([id number]=[id number])    Result Date: 12/2/2021  CONCLUSION:   There is a nonocclusive thrombus in the distal popliteal vein which obtain a lupus anticoagulant and antiphospholipid panel given her history of a positive PAUL. I have however advised her to see Dr. Homero Russell as soon as possible to get his input on this treatment plan and duration of treatment.   Usual bleeding precautions we

## 2021-12-16 ENCOUNTER — LAB ENCOUNTER (OUTPATIENT)
Dept: LAB | Facility: HOSPITAL | Age: 51
End: 2021-12-16
Attending: INTERNAL MEDICINE
Payer: COMMERCIAL

## 2021-12-16 ENCOUNTER — OFFICE VISIT (OUTPATIENT)
Dept: HEMATOLOGY/ONCOLOGY | Facility: HOSPITAL | Age: 51
End: 2021-12-16
Attending: INTERNAL MEDICINE
Payer: COMMERCIAL

## 2021-12-16 VITALS
BODY MASS INDEX: 41.37 KG/M2 | WEIGHT: 257.38 LBS | HEIGHT: 66 IN | RESPIRATION RATE: 16 BRPM | SYSTOLIC BLOOD PRESSURE: 122 MMHG | OXYGEN SATURATION: 100 % | TEMPERATURE: 98 F | HEART RATE: 82 BPM | DIASTOLIC BLOOD PRESSURE: 85 MMHG

## 2021-12-16 DIAGNOSIS — C84.70 ANAPLASTIC ALK-NEGATIVE LARGE CELL LYMPHOMA, UNSPECIFIED BODY REGION (HCC): ICD-10-CM

## 2021-12-16 DIAGNOSIS — Z79.01 CURRENT USE OF ANTICOAGULANT THERAPY: ICD-10-CM

## 2021-12-16 DIAGNOSIS — I82.402 DEEP VEIN THROMBOSIS (DVT) OF LEFT LOWER EXTREMITY, UNSPECIFIED CHRONICITY, UNSPECIFIED VEIN (HCC): Primary | ICD-10-CM

## 2021-12-16 DIAGNOSIS — I82.402 DEEP VEIN THROMBOSIS (DVT) OF LEFT LOWER EXTREMITY, UNSPECIFIED CHRONICITY, UNSPECIFIED VEIN (HCC): ICD-10-CM

## 2021-12-16 DIAGNOSIS — Z94.84 HISTORY OF STEM CELL TRANSPLANT (HCC): ICD-10-CM

## 2021-12-16 PROCEDURE — 80053 COMPREHEN METABOLIC PANEL: CPT

## 2021-12-16 PROCEDURE — 99214 OFFICE O/P EST MOD 30 MIN: CPT | Performed by: INTERNAL MEDICINE

## 2021-12-16 PROCEDURE — 85025 COMPLETE CBC W/AUTO DIFF WBC: CPT

## 2021-12-16 PROCEDURE — 36415 COLL VENOUS BLD VENIPUNCTURE: CPT

## 2021-12-16 PROCEDURE — 83615 LACTATE (LD) (LDH) ENZYME: CPT

## 2021-12-16 NOTE — PROGRESS NOTES
Name: Rod Becker  MRN: [id number]  YOB: 1970  CSN: [id number]  Date of Visit: 12/16/2021      Chief Complaint: ALCL; ALK negative, stage III, DVT of the left leg      HPI:  MsPierre Ellie Forrester is seen in the office for follow up regarding ALK Daisy's ROS is otherwise negative. September, 2019 marked this patient's 5yr anniversary from her lymphoma diagnosis& chemotherapy treatment.  Klaudia Kapadia mentions that the time of her diagnosis she presented with profound fatigue, profound itching through Take 1 tablet (20 mg total) by mouth daily with food. To begin AFTER your starter pack medications finish 30 tablet 6   • rivaroxaban 15 & 20 MG Oral Tablet Therapy Pack Take 15 mg twice daily x21 days then 20 mg daily.  1 each 0   • LEVOTHYROXINE 100 MCG O Never Used    Substance and Sexual Activity      Alcohol use: Yes        Comment:  Occ      Drug use: No      Sexual activity: Yes        Partners: Male        Birth control/protection: Tubal Ligation    Other Topics      Concerns:         Service: (5' 6\")   Wt 116.8 kg (257 lb 6.4 oz)   SpO2 100%   BMI 41.55 kg/m²     Physical Exam  Constitutional:       General: She is not in acute distress. Appearance: She is well-developed. HENT:      Head: Normocephalic and atraumatic.       Nose: Nose nor Judgment: Judgment normal.       Lab Results   Component Value Date    WBC 7.2 10/07/2021    RBC 4.65 10/07/2021    HGB 14.1 10/07/2021    HCT 42.6 10/07/2021    MCV 91.6 10/07/2021    MCH 30.3 10/07/2021    MCHC 33.1 10/07/2021    RDW 13.3 10/07/2021    P abdomen, or pelvis. Patient is also following-up with [facility] where she was transplanted by Dr. Mary Miller, who she sees once a year.    Previously discussed a plan to repeat CT imaging based on new clinical signs or symptoms suggestive of rec

## 2021-12-17 ENCOUNTER — TELEPHONE (OUTPATIENT)
Dept: HEMATOLOGY/ONCOLOGY | Facility: HOSPITAL | Age: 51
End: 2021-12-17

## 2021-12-17 NOTE — TELEPHONE ENCOUNTER
Patient called and said the earliest appointment for  CT Scan she can get in is 12/27/21 but Dr. Nancy Kovacs wanted the patient to have the scan before next appointment. The  told her to call Dr. Nancy Kovacs to see if he could get an earlier appointment.

## 2021-12-17 NOTE — TELEPHONE ENCOUNTER
Spoke with patient and informed her that I was able to schedule her CT scan on 12/22 at 6:30pm. Patient stated that this time would work for her and thanked me for calling.

## 2021-12-22 ENCOUNTER — HOSPITAL ENCOUNTER (OUTPATIENT)
Dept: CT IMAGING | Age: 51
Discharge: HOME | End: 2021-12-22
Attending: INTERNAL MEDICINE
Payer: COMMERCIAL

## 2021-12-22 DIAGNOSIS — Z94.84 HISTORY OF STEM CELL TRANSPLANT (HCC): ICD-10-CM

## 2021-12-22 DIAGNOSIS — C84.70 ANAPLASTIC ALK-NEGATIVE LARGE CELL LYMPHOMA, UNSPECIFIED BODY REGION (HCC): ICD-10-CM

## 2021-12-22 PROCEDURE — 71260 CT THORAX DX C+: CPT | Performed by: INTERNAL MEDICINE

## 2021-12-22 PROCEDURE — 70491 CT SOFT TISSUE NECK W/DYE: CPT | Performed by: INTERNAL MEDICINE

## 2021-12-22 PROCEDURE — 74177 CT ABD & PELVIS W/CONTRAST: CPT | Performed by: INTERNAL MEDICINE

## 2021-12-23 ENCOUNTER — OFFICE VISIT (OUTPATIENT)
Dept: HEMATOLOGY/ONCOLOGY | Facility: HOSPITAL | Age: 51
End: 2021-12-23
Attending: INTERNAL MEDICINE
Payer: COMMERCIAL

## 2021-12-23 VITALS
OXYGEN SATURATION: 98 % | SYSTOLIC BLOOD PRESSURE: 123 MMHG | DIASTOLIC BLOOD PRESSURE: 84 MMHG | HEART RATE: 92 BPM | TEMPERATURE: 98 F | RESPIRATION RATE: 18 BRPM

## 2021-12-23 DIAGNOSIS — Z08 ENCOUNTER FOR FOLLOW-UP SURVEILLANCE OF LYMPHOMA: Primary | ICD-10-CM

## 2021-12-23 DIAGNOSIS — Z85.72 ENCOUNTER FOR FOLLOW-UP SURVEILLANCE OF LYMPHOMA: Primary | ICD-10-CM

## 2021-12-23 PROCEDURE — 99214 OFFICE O/P EST MOD 30 MIN: CPT | Performed by: INTERNAL MEDICINE

## 2021-12-23 NOTE — PROGRESS NOTES
Name: Liberty Barker  MRN: [id number]  YOB: 1970  CSN: [id number]  Date of Visit: 12/23/2021      Chief Complaint: ALCL; ALK negative, stage III, DVT of the left leg      HPI:  Ms. Yuki Cole is seen in the office for follow up regarding ALK anticardiolipin IgM antibody measured at 10; values> 40 are considered to be positive. Sukhdeep Charles mentions her left leg which is typically larger than the right had suddenly become increasingly larger and painful which prompted ultrasound imaging.   Patient 30 tablet 6   • rivaroxaban 15 & 20 MG Oral Tablet Therapy Pack Take 15 mg twice daily x21 days then 20 mg daily.  1 each 0   • LEVOTHYROXINE 100 MCG Oral Tab TAKE 1 TABLET BY MOUTH DAILY BEFORE BREAKFAST 90 tablet 0   • ROSUVASTATIN CALCIUM 40 MG Oral Tab Sexual activity: Yes        Partners: Male        Birth control/protection: Tubal Ligation    Other Topics      Concerns:         Service: Not Asked        Blood Transfusions: Not Asked        Caffeine Concern: Yes          Soda, Coffee - 2 cups da well-developed. HENT:      Head: Normocephalic and atraumatic. Nose: Nose normal.      Mouth/Throat:      Pharynx: No oropharyngeal exudate.    Eyes:      Conjunctiva/sclera: Conjunctivae normal.      Pupils: Pupils are equal, round, and reactive to MCH 29.8 12/16/2021    MCHC 32.2 12/16/2021    RDW 13.2 12/16/2021    .0 12/16/2021    MPV 9.2 08/11/2018     Lab Results   Component Value Date     (H) 12/16/2021    BUN 17 12/16/2021    BUNCREA 23.0 (H) 12/16/2021    CREATSERUM 0.74 12/1 chest, abdomen, or pelvis. Patient is also following-up with [facility] where she was transplanted by Dr. Cherrie Wynn, who she sees once a year.    Previously discussed a plan to repeat CT imaging based on new clinical signs or symptoms suggestive

## 2021-12-30 RX ORDER — ROSUVASTATIN CALCIUM 40 MG/1
TABLET, COATED ORAL
Qty: 90 TABLET | Refills: 1 | Status: SHIPPED | OUTPATIENT
Start: 2021-12-30

## 2022-01-01 ENCOUNTER — PATIENT MESSAGE (OUTPATIENT)
Dept: NEPHROLOGY | Facility: CLINIC | Age: 52
End: 2022-01-01

## 2022-01-01 DIAGNOSIS — N39.0 URINARY TRACT INFECTION WITHOUT HEMATURIA, SITE UNSPECIFIED: Primary | ICD-10-CM

## 2022-01-03 ENCOUNTER — LAB ENCOUNTER (OUTPATIENT)
Dept: LAB | Age: 52
End: 2022-01-03
Attending: INTERNAL MEDICINE
Payer: COMMERCIAL

## 2022-01-03 DIAGNOSIS — N39.0 URINARY TRACT INFECTION WITHOUT HEMATURIA, SITE UNSPECIFIED: ICD-10-CM

## 2022-01-03 LAB
BILIRUB UR QL: NEGATIVE
COLOR UR: YELLOW
GLUCOSE UR-MCNC: NEGATIVE MG/DL
HGB UR QL STRIP.AUTO: NEGATIVE
KETONES UR-MCNC: NEGATIVE MG/DL
NITRITE UR QL STRIP.AUTO: NEGATIVE
PH UR: 5 [PH] (ref 5–8)
PROT UR-MCNC: NEGATIVE MG/DL
SP GR UR STRIP: 1.02 (ref 1–1.03)
UROBILINOGEN UR STRIP-ACNC: <2

## 2022-01-03 PROCEDURE — 81001 URINALYSIS AUTO W/SCOPE: CPT

## 2022-01-03 PROCEDURE — 87086 URINE CULTURE/COLONY COUNT: CPT

## 2022-01-03 NOTE — TELEPHONE ENCOUNTER
From: Ivan Burch  To: Rosemarie Rahman MD  Sent: 1/1/2022 6:03 PM CST  Subject: Uti    I believe I may have a uti or kidney infection.     Frequent somewhat painful urinating   Lower back pain - for the past two days    Is it possible to do a test to s

## 2022-01-03 NOTE — TELEPHONE ENCOUNTER
Spoke with patient regarding message below. Patient states she is still feeling symptoms. She states she has not found a new doctor yet but she will call today to get in.

## 2022-01-06 ENCOUNTER — TELEPHONE (OUTPATIENT)
Dept: NEPHROLOGY | Facility: CLINIC | Age: 52
End: 2022-01-06

## 2022-01-06 DIAGNOSIS — R30.0 DYSURIA: Primary | ICD-10-CM

## 2022-01-06 RX ORDER — NITROFURANTOIN 25; 75 MG/1; MG/1
100 CAPSULE ORAL 2 TIMES DAILY
Qty: 10 CAPSULE | Refills: 0 | Status: SHIPPED | OUTPATIENT
Start: 2022-01-06 | End: 2022-01-11

## 2022-01-06 NOTE — TELEPHONE ENCOUNTER
The urine culture just showed a very low-grade amount of bacteria. If asymptomatic no treatments but if she still has dysuria can treat with Macrobid 100 mg twice daily x5 days.

## 2022-01-06 NOTE — TELEPHONE ENCOUNTER
Spoke with pt, discussed below message. Pt verbalizes understanding. States she is still experiencing dysuria, rx sent.

## 2022-01-10 ENCOUNTER — OFFICE VISIT (OUTPATIENT)
Dept: FAMILY MEDICINE CLINIC | Facility: CLINIC | Age: 52
End: 2022-01-10
Payer: COMMERCIAL

## 2022-01-10 VITALS
HEIGHT: 66 IN | DIASTOLIC BLOOD PRESSURE: 73 MMHG | BODY MASS INDEX: 40.45 KG/M2 | RESPIRATION RATE: 17 BRPM | SYSTOLIC BLOOD PRESSURE: 109 MMHG | HEART RATE: 76 BPM | WEIGHT: 251.69 LBS

## 2022-01-10 DIAGNOSIS — Z85.79 HISTORY OF LYMPHOMA: ICD-10-CM

## 2022-01-10 DIAGNOSIS — E78.00 HYPERCHOLESTEREMIA: ICD-10-CM

## 2022-01-10 DIAGNOSIS — I82.402 DEEP VEIN THROMBOSIS (DVT) OF LEFT LOWER EXTREMITY, UNSPECIFIED CHRONICITY, UNSPECIFIED VEIN (HCC): Primary | ICD-10-CM

## 2022-01-10 DIAGNOSIS — E03.9 ACQUIRED HYPOTHYROIDISM: ICD-10-CM

## 2022-01-10 PROCEDURE — 3008F BODY MASS INDEX DOCD: CPT | Performed by: FAMILY MEDICINE

## 2022-01-10 PROCEDURE — 3078F DIAST BP <80 MM HG: CPT | Performed by: FAMILY MEDICINE

## 2022-01-10 PROCEDURE — 3074F SYST BP LT 130 MM HG: CPT | Performed by: FAMILY MEDICINE

## 2022-01-10 PROCEDURE — 99204 OFFICE O/P NEW MOD 45 MIN: CPT | Performed by: FAMILY MEDICINE

## 2022-01-10 NOTE — PROGRESS NOTES
Leticia Barrera is a 46year old female. HPI:   Patient is here to establish care. Patient was following with Dr. Sanjuana Lange and need to establish care with a new primary.     She was recently diagnosed with DVT, she is currently taking rivaroxaban 20 mg, s Anaplastic large cell lymphoma, unspecified site, extranodal and solid organ sites 05/29/2014   • High cholesterol    • History of blood transfusion    • Hypothyroidism     Hypothyroidism, primary   • Other malignant lymphomas of intra-abdominal lymph node ASSESSMENT AND PLAN:   Reviewed prior blood work, last mammogram, last colonoscopy, venous ultrasounds from 2021 as well as 2014, notes from oncology as well as nephrology.     1. Deep vein thrombosis (DVT) of left lower extremity, unspecified chronic

## 2022-02-25 ENCOUNTER — TELEPHONE (OUTPATIENT)
Dept: HEMATOLOGY/ONCOLOGY | Facility: HOSPITAL | Age: 52
End: 2022-02-25

## 2022-02-25 ENCOUNTER — HOSPITAL ENCOUNTER (OUTPATIENT)
Age: 52
Discharge: HOME | End: 2022-02-25
Attending: EMERGENCY MEDICINE
Payer: COMMERCIAL

## 2022-02-25 ENCOUNTER — APPOINTMENT (OUTPATIENT)
Dept: ULTRASOUND IMAGING | Age: 52
End: 2022-02-25
Attending: EMERGENCY MEDICINE
Payer: COMMERCIAL

## 2022-02-25 VITALS
HEART RATE: 80 BPM | DIASTOLIC BLOOD PRESSURE: 78 MMHG | TEMPERATURE: 98 F | OXYGEN SATURATION: 98 % | RESPIRATION RATE: 22 BRPM | SYSTOLIC BLOOD PRESSURE: 128 MMHG

## 2022-02-25 DIAGNOSIS — M79.89 LEFT LEG SWELLING: Primary | ICD-10-CM

## 2022-02-25 PROCEDURE — 93971 EXTREMITY STUDY: CPT | Performed by: EMERGENCY MEDICINE

## 2022-02-25 PROCEDURE — 99214 OFFICE O/P EST MOD 30 MIN: CPT

## 2022-02-25 PROCEDURE — 99203 OFFICE O/P NEW LOW 30 MIN: CPT

## 2022-02-25 RX ORDER — RIVAROXABAN 20 MG/1
20 TABLET, FILM COATED ORAL
COMMUNITY
Start: 2022-01-31 | End: 2022-03-03 | Stop reason: ALTCHOICE

## 2022-02-25 NOTE — ED INITIAL ASSESSMENT (HPI)
Hx of DVT to LLE since 12/2021. Taking Xarelto. C/o increased pain and swelling to LLE for 1 week. No trauma. Mild redness. Denies chest pain or SOB.

## 2022-02-25 NOTE — TELEPHONE ENCOUNTER
Toxicities: Rivaroxaban      L LE DVT: (Patient diagnosed with left lower extremity DVT on 12/16/2022. She has been taking her Rivaroxaban daily as ordered. She reports that her left leg is still swollen, warm and painful. She is having problems getting a shoe on. Sirena Rahman is asking why her leg has not improved?)    I asked Sirena Rahman to please hold. I updated YOSEPH Bales. She asked me to have Sirena Rahman to present to the [address] [place] [address] in [place] for evaluation. I updated Sirena Rahman and she agreed. She will have her  bring her there now.

## 2022-02-28 ENCOUNTER — TELEPHONE (OUTPATIENT)
Dept: HEMATOLOGY/ONCOLOGY | Facility: HOSPITAL | Age: 52
End: 2022-02-28

## 2022-02-28 NOTE — TELEPHONE ENCOUNTER
Spoke with patient to schedule an appointment with Dr. Tamia Echavarria for a PHFU. Informed patient that the provider wanted her to follow up in about 3 days with Dr. Tamia Echavarria. She stated she had just called and made an appointment for Thursday. I thanked patient.

## 2022-03-02 ENCOUNTER — PATIENT MESSAGE (OUTPATIENT)
Dept: OBGYN CLINIC | Facility: CLINIC | Age: 52
End: 2022-03-02

## 2022-03-02 NOTE — TELEPHONE ENCOUNTER
From: Rylie Hare  To: Geri Isidro MD  Sent: 3/2/2022 9:55 AM CST  Subject: Liliane Muñiz needed    I need an order for my mammogram     Thank you

## 2022-03-03 ENCOUNTER — LAB ENCOUNTER (OUTPATIENT)
Dept: LAB | Facility: HOSPITAL | Age: 52
End: 2022-03-03
Attending: INTERNAL MEDICINE
Payer: COMMERCIAL

## 2022-03-03 ENCOUNTER — OFFICE VISIT (OUTPATIENT)
Dept: HEMATOLOGY/ONCOLOGY | Facility: HOSPITAL | Age: 52
End: 2022-03-03
Attending: INTERNAL MEDICINE
Payer: COMMERCIAL

## 2022-03-03 VITALS
HEIGHT: 66 IN | OXYGEN SATURATION: 98 % | RESPIRATION RATE: 18 BRPM | SYSTOLIC BLOOD PRESSURE: 117 MMHG | WEIGHT: 258 LBS | BODY MASS INDEX: 41.46 KG/M2 | DIASTOLIC BLOOD PRESSURE: 76 MMHG | TEMPERATURE: 98 F | HEART RATE: 79 BPM

## 2022-03-03 DIAGNOSIS — Z08 ENCOUNTER FOR FOLLOW-UP SURVEILLANCE OF LYMPHOMA: ICD-10-CM

## 2022-03-03 DIAGNOSIS — Z86.718 HISTORY OF DVT (DEEP VEIN THROMBOSIS): ICD-10-CM

## 2022-03-03 DIAGNOSIS — Z85.72 ENCOUNTER FOR FOLLOW-UP SURVEILLANCE OF LYMPHOMA: Primary | ICD-10-CM

## 2022-03-03 DIAGNOSIS — C84.70 ANAPLASTIC ALK-NEGATIVE LARGE CELL LYMPHOMA, UNSPECIFIED BODY REGION (HCC): ICD-10-CM

## 2022-03-03 DIAGNOSIS — Z85.72 ENCOUNTER FOR FOLLOW-UP SURVEILLANCE OF LYMPHOMA: ICD-10-CM

## 2022-03-03 DIAGNOSIS — Z79.01 CURRENT USE OF ANTICOAGULANT THERAPY: ICD-10-CM

## 2022-03-03 DIAGNOSIS — Z94.84 HISTORY OF STEM CELL TRANSPLANT (HCC): ICD-10-CM

## 2022-03-03 DIAGNOSIS — Z08 ENCOUNTER FOR FOLLOW-UP SURVEILLANCE OF LYMPHOMA: Primary | ICD-10-CM

## 2022-03-03 LAB
ALBUMIN SERPL-MCNC: 3.9 G/DL (ref 3.4–5)
ALBUMIN/GLOB SERPL: 1.2 {RATIO} (ref 1–2)
ALP LIVER SERPL-CCNC: 83 U/L
ANION GAP SERPL CALC-SCNC: 2 MMOL/L (ref 0–18)
AST SERPL-CCNC: 21 U/L (ref 15–37)
BASOPHILS # BLD AUTO: 0.05 X10(3) UL (ref 0–0.2)
BASOPHILS NFR BLD AUTO: 0.7 %
BILIRUB SERPL-MCNC: 0.3 MG/DL (ref 0.1–2)
BUN BLD-MCNC: 9 MG/DL (ref 7–18)
BUN/CREAT SERPL: 11.8 (ref 10–20)
CALCIUM BLD-MCNC: 8.8 MG/DL (ref 8.5–10.1)
CHLORIDE SERPL-SCNC: 107 MMOL/L (ref 98–112)
CO2 SERPL-SCNC: 31 MMOL/L (ref 21–32)
CREAT BLD-MCNC: 0.76 MG/DL
DEPRECATED RDW RBC AUTO: 44.9 FL (ref 35.1–46.3)
EOSINOPHIL # BLD AUTO: 0.14 X10(3) UL (ref 0–0.7)
EOSINOPHIL NFR BLD AUTO: 2 %
ERYTHROCYTE [DISTWIDTH] IN BLOOD BY AUTOMATED COUNT: 13.4 % (ref 11–15)
FASTING STATUS PATIENT QL REPORTED: YES
GLOBULIN PLAS-MCNC: 3.2 G/DL (ref 2.8–4.4)
GLUCOSE BLD-MCNC: 75 MG/DL (ref 70–99)
HCT VFR BLD AUTO: 47.1 %
HGB BLD-MCNC: 15.2 G/DL
IMM GRANULOCYTES # BLD AUTO: 0.02 X10(3) UL (ref 0–1)
IMM GRANULOCYTES NFR BLD: 0.3 %
LDH SERPL L TO P-CCNC: 173 U/L
LYMPHOCYTES # BLD AUTO: 2.79 X10(3) UL (ref 1–4)
LYMPHOCYTES NFR BLD AUTO: 40.4 %
MCH RBC QN AUTO: 29.5 PG (ref 26–34)
MCHC RBC AUTO-ENTMCNC: 32.3 G/DL (ref 31–37)
MCV RBC AUTO: 91.3 FL
MONOCYTES # BLD AUTO: 0.57 X10(3) UL (ref 0.1–1)
MONOCYTES NFR BLD AUTO: 8.3 %
NEUTROPHILS # BLD AUTO: 3.33 X10 (3) UL (ref 1.5–7.7)
NEUTROPHILS # BLD AUTO: 3.33 X10(3) UL (ref 1.5–7.7)
NEUTROPHILS NFR BLD AUTO: 48.3 %
OSMOLALITY SERPL CALC.SUM OF ELEC: 287 MOSM/KG (ref 275–295)
PLATELET # BLD AUTO: 227 10(3)UL (ref 150–450)
POTASSIUM SERPL-SCNC: 3.9 MMOL/L (ref 3.5–5.1)
PROT SERPL-MCNC: 7.1 G/DL (ref 6.4–8.2)
RBC # BLD AUTO: 5.16 X10(6)UL
SODIUM SERPL-SCNC: 140 MMOL/L (ref 136–145)
WBC # BLD AUTO: 6.9 X10(3) UL (ref 4–11)

## 2022-03-03 PROCEDURE — 36415 COLL VENOUS BLD VENIPUNCTURE: CPT

## 2022-03-03 PROCEDURE — 99214 OFFICE O/P EST MOD 30 MIN: CPT | Performed by: INTERNAL MEDICINE

## 2022-03-03 PROCEDURE — 85025 COMPLETE CBC W/AUTO DIFF WBC: CPT

## 2022-03-03 PROCEDURE — 80053 COMPREHEN METABOLIC PANEL: CPT

## 2022-03-03 PROCEDURE — 83615 LACTATE (LD) (LDH) ENZYME: CPT

## 2022-03-08 ENCOUNTER — HOSPITAL ENCOUNTER (OUTPATIENT)
Dept: CT IMAGING | Age: 52
Discharge: HOME | End: 2022-03-08
Attending: INTERNAL MEDICINE
Payer: COMMERCIAL

## 2022-03-08 ENCOUNTER — APPOINTMENT (OUTPATIENT)
Dept: HEMATOLOGY/ONCOLOGY | Facility: HOSPITAL | Age: 52
End: 2022-03-08
Attending: INTERNAL MEDICINE
Payer: COMMERCIAL

## 2022-03-08 DIAGNOSIS — Z85.72 ENCOUNTER FOR FOLLOW-UP SURVEILLANCE OF LYMPHOMA: ICD-10-CM

## 2022-03-08 DIAGNOSIS — Z94.84 HISTORY OF STEM CELL TRANSPLANT (HCC): ICD-10-CM

## 2022-03-08 DIAGNOSIS — C84.70 ANAPLASTIC ALK-NEGATIVE LARGE CELL LYMPHOMA, UNSPECIFIED BODY REGION (HCC): ICD-10-CM

## 2022-03-08 DIAGNOSIS — Z08 ENCOUNTER FOR FOLLOW-UP SURVEILLANCE OF LYMPHOMA: ICD-10-CM

## 2022-03-08 PROCEDURE — 71260 CT THORAX DX C+: CPT | Performed by: INTERNAL MEDICINE

## 2022-03-08 PROCEDURE — 74177 CT ABD & PELVIS W/CONTRAST: CPT | Performed by: INTERNAL MEDICINE

## 2022-03-08 PROCEDURE — 73701 CT LOWER EXTREMITY W/DYE: CPT | Performed by: INTERNAL MEDICINE

## 2022-03-14 ENCOUNTER — OFFICE VISIT (OUTPATIENT)
Dept: FAMILY MEDICINE CLINIC | Facility: CLINIC | Age: 52
End: 2022-03-14
Payer: COMMERCIAL

## 2022-03-14 ENCOUNTER — TELEPHONE (OUTPATIENT)
Dept: HEMATOLOGY/ONCOLOGY | Facility: HOSPITAL | Age: 52
End: 2022-03-14

## 2022-03-14 VITALS
WEIGHT: 254.31 LBS | BODY MASS INDEX: 40.87 KG/M2 | HEART RATE: 78 BPM | DIASTOLIC BLOOD PRESSURE: 74 MMHG | HEIGHT: 66 IN | RESPIRATION RATE: 17 BRPM | SYSTOLIC BLOOD PRESSURE: 112 MMHG

## 2022-03-14 DIAGNOSIS — B07.9 VIRAL WART ON FINGER: ICD-10-CM

## 2022-03-14 DIAGNOSIS — Z00.00 WELLNESS EXAMINATION: Primary | ICD-10-CM

## 2022-03-14 DIAGNOSIS — Z23 NEED FOR SHINGLES VACCINE: ICD-10-CM

## 2022-03-14 DIAGNOSIS — Z13.220 LIPID SCREENING: ICD-10-CM

## 2022-03-14 DIAGNOSIS — Z13.29 THYROID DISORDER SCREEN: ICD-10-CM

## 2022-03-14 PROCEDURE — 3074F SYST BP LT 130 MM HG: CPT | Performed by: FAMILY MEDICINE

## 2022-03-14 PROCEDURE — 90750 HZV VACC RECOMBINANT IM: CPT | Performed by: FAMILY MEDICINE

## 2022-03-14 PROCEDURE — 3008F BODY MASS INDEX DOCD: CPT | Performed by: FAMILY MEDICINE

## 2022-03-14 PROCEDURE — 90471 IMMUNIZATION ADMIN: CPT | Performed by: FAMILY MEDICINE

## 2022-03-14 PROCEDURE — 3078F DIAST BP <80 MM HG: CPT | Performed by: FAMILY MEDICINE

## 2022-03-14 PROCEDURE — 99396 PREV VISIT EST AGE 40-64: CPT | Performed by: FAMILY MEDICINE

## 2022-03-15 ENCOUNTER — OFFICE VISIT (OUTPATIENT)
Dept: HEMATOLOGY/ONCOLOGY | Facility: HOSPITAL | Age: 52
End: 2022-03-15
Attending: INTERNAL MEDICINE
Payer: COMMERCIAL

## 2022-03-15 VITALS
RESPIRATION RATE: 18 BRPM | DIASTOLIC BLOOD PRESSURE: 80 MMHG | BODY MASS INDEX: 40.5 KG/M2 | SYSTOLIC BLOOD PRESSURE: 116 MMHG | HEART RATE: 83 BPM | HEIGHT: 66 IN | TEMPERATURE: 98 F | WEIGHT: 252 LBS | OXYGEN SATURATION: 98 %

## 2022-03-15 DIAGNOSIS — Z86.718 HISTORY OF DVT (DEEP VEIN THROMBOSIS): ICD-10-CM

## 2022-03-15 DIAGNOSIS — Z79.01 CURRENT USE OF ANTICOAGULANT THERAPY: ICD-10-CM

## 2022-03-15 DIAGNOSIS — Z85.72 ENCOUNTER FOR FOLLOW-UP SURVEILLANCE OF LYMPHOMA: Primary | ICD-10-CM

## 2022-03-15 DIAGNOSIS — Z08 ENCOUNTER FOR FOLLOW-UP SURVEILLANCE OF LYMPHOMA: Primary | ICD-10-CM

## 2022-03-15 PROCEDURE — 99213 OFFICE O/P EST LOW 20 MIN: CPT | Performed by: INTERNAL MEDICINE

## 2022-03-16 ENCOUNTER — OFFICE VISIT (OUTPATIENT)
Dept: PHYSICAL MEDICINE AND REHAB | Facility: CLINIC | Age: 52
End: 2022-03-16
Payer: COMMERCIAL

## 2022-03-16 ENCOUNTER — ORDER TRANSCRIPTION (OUTPATIENT)
Dept: PHYSICAL THERAPY | Facility: HOSPITAL | Age: 52
End: 2022-03-16

## 2022-03-16 VITALS — HEART RATE: 99 BPM | OXYGEN SATURATION: 96 % | BODY MASS INDEX: 40.5 KG/M2 | WEIGHT: 252 LBS | HEIGHT: 66 IN

## 2022-03-16 DIAGNOSIS — M76.822 TIBIALIS POSTERIOR TENDONITIS, LEFT: ICD-10-CM

## 2022-03-16 DIAGNOSIS — I89.0 LYMPHEDEMA: Primary | ICD-10-CM

## 2022-03-16 DIAGNOSIS — M17.12 PRIMARY OSTEOARTHRITIS OF LEFT KNEE: ICD-10-CM

## 2022-03-16 PROCEDURE — 99244 OFF/OP CNSLTJ NEW/EST MOD 40: CPT | Performed by: PHYSICAL MEDICINE & REHABILITATION

## 2022-03-16 PROCEDURE — 3008F BODY MASS INDEX DOCD: CPT | Performed by: PHYSICAL MEDICINE & REHABILITATION

## 2022-03-16 RX ORDER — METHYLPREDNISOLONE 4 MG/1
TABLET ORAL
Qty: 1 EACH | Refills: 0 | Status: SHIPPED | OUTPATIENT
Start: 2022-03-16 | End: 2022-03-28

## 2022-03-16 NOTE — PATIENT INSTRUCTIONS
-Start physical therapy and home exercises  -Medrol dose pack to be started  -Ice as much as tolerated  -Please stop the medication if you have any side effects and call the office if you have any questions or concerns  -Follow up in 4 weeks

## 2022-03-18 ENCOUNTER — ORDER TRANSCRIPTION (OUTPATIENT)
Dept: PHYSICAL THERAPY | Facility: HOSPITAL | Age: 52
End: 2022-03-18

## 2022-03-18 ENCOUNTER — TELEPHONE (OUTPATIENT)
Dept: PHYSICAL THERAPY | Facility: HOSPITAL | Age: 52
End: 2022-03-18

## 2022-03-21 ENCOUNTER — TELEPHONE (OUTPATIENT)
Dept: PHYSICAL THERAPY | Facility: HOSPITAL | Age: 52
End: 2022-03-21

## 2022-03-23 PROBLEM — M17.12 PRIMARY OSTEOARTHRITIS OF LEFT KNEE: Status: ACTIVE | Noted: 2022-03-23

## 2022-03-23 PROBLEM — M76.822 TIBIALIS POSTERIOR TENDONITIS, LEFT: Status: ACTIVE | Noted: 2022-03-23

## 2022-03-23 PROBLEM — I89.0 LYMPHEDEMA: Status: ACTIVE | Noted: 2022-03-23

## 2022-03-28 ENCOUNTER — HOSPITAL ENCOUNTER (OUTPATIENT)
Dept: MAMMOGRAPHY | Age: 52
Discharge: HOME | End: 2022-03-28
Attending: OBSTETRICS & GYNECOLOGY
Payer: COMMERCIAL

## 2022-03-28 ENCOUNTER — OFFICE VISIT (OUTPATIENT)
Dept: OBGYN CLINIC | Facility: CLINIC | Age: 52
End: 2022-03-28
Payer: COMMERCIAL

## 2022-03-28 VITALS
BODY MASS INDEX: 40 KG/M2 | DIASTOLIC BLOOD PRESSURE: 72 MMHG | HEART RATE: 108 BPM | SYSTOLIC BLOOD PRESSURE: 104 MMHG | WEIGHT: 247.63 LBS

## 2022-03-28 DIAGNOSIS — Z01.419 ENCOUNTER FOR GYNECOLOGICAL EXAMINATION: Primary | ICD-10-CM

## 2022-03-28 DIAGNOSIS — Z12.31 ENCOUNTER FOR SCREENING MAMMOGRAM FOR MALIGNANT NEOPLASM OF BREAST: ICD-10-CM

## 2022-03-28 PROCEDURE — 99396 PREV VISIT EST AGE 40-64: CPT | Performed by: OBSTETRICS & GYNECOLOGY

## 2022-03-28 PROCEDURE — 3074F SYST BP LT 130 MM HG: CPT | Performed by: OBSTETRICS & GYNECOLOGY

## 2022-03-28 PROCEDURE — 3078F DIAST BP <80 MM HG: CPT | Performed by: OBSTETRICS & GYNECOLOGY

## 2022-03-28 PROCEDURE — 77063 BREAST TOMOSYNTHESIS BI: CPT | Performed by: OBSTETRICS & GYNECOLOGY

## 2022-03-28 PROCEDURE — 77067 SCR MAMMO BI INCL CAD: CPT | Performed by: OBSTETRICS & GYNECOLOGY

## 2022-03-30 ENCOUNTER — HOSPITAL ENCOUNTER (OUTPATIENT)
Dept: MAMMOGRAPHY | Facility: HOSPITAL | Age: 52
Discharge: HOME | End: 2022-03-30
Attending: OBSTETRICS & GYNECOLOGY
Payer: COMMERCIAL

## 2022-03-30 DIAGNOSIS — R92.8 ABNORMAL MAMMOGRAM: ICD-10-CM

## 2022-03-30 PROCEDURE — 77065 DX MAMMO INCL CAD UNI: CPT | Performed by: OBSTETRICS & GYNECOLOGY

## 2022-03-30 PROCEDURE — 77061 BREAST TOMOSYNTHESIS UNI: CPT | Performed by: OBSTETRICS & GYNECOLOGY

## 2022-04-02 RX ORDER — LEVOTHYROXINE SODIUM 0.1 MG/1
TABLET ORAL
Qty: 90 TABLET | Refills: 0 | Status: SHIPPED | OUTPATIENT
Start: 2022-04-02

## 2022-04-07 ENCOUNTER — IMMUNIZATION (OUTPATIENT)
Dept: LAB | Age: 52
End: 2022-04-07
Attending: EMERGENCY MEDICINE
Payer: COMMERCIAL

## 2022-04-07 DIAGNOSIS — Z23 NEED FOR VACCINATION: Primary | ICD-10-CM

## 2022-04-07 PROCEDURE — 0054A SARSCOV2 VAC 30MCG TRS SUCR: CPT

## 2022-04-11 ENCOUNTER — OFFICE VISIT (OUTPATIENT)
Dept: FAMILY MEDICINE CLINIC | Facility: CLINIC | Age: 52
End: 2022-04-11
Payer: COMMERCIAL

## 2022-04-11 VITALS
BODY MASS INDEX: 40.34 KG/M2 | HEIGHT: 66 IN | HEART RATE: 81 BPM | WEIGHT: 251 LBS | SYSTOLIC BLOOD PRESSURE: 113 MMHG | DIASTOLIC BLOOD PRESSURE: 80 MMHG

## 2022-04-11 DIAGNOSIS — B07.9 VIRAL WART ON FINGER: Primary | ICD-10-CM

## 2022-04-11 PROCEDURE — 3008F BODY MASS INDEX DOCD: CPT | Performed by: FAMILY MEDICINE

## 2022-04-11 PROCEDURE — 17110 DESTRUCTION B9 LES UP TO 14: CPT | Performed by: FAMILY MEDICINE

## 2022-04-11 PROCEDURE — 3079F DIAST BP 80-89 MM HG: CPT | Performed by: FAMILY MEDICINE

## 2022-04-11 PROCEDURE — 3074F SYST BP LT 130 MM HG: CPT | Performed by: FAMILY MEDICINE

## 2022-04-13 ENCOUNTER — OFFICE VISIT (OUTPATIENT)
Dept: PHYSICAL MEDICINE AND REHAB | Facility: CLINIC | Age: 52
End: 2022-04-13
Payer: COMMERCIAL

## 2022-04-13 VITALS
DIASTOLIC BLOOD PRESSURE: 82 MMHG | SYSTOLIC BLOOD PRESSURE: 126 MMHG | BODY MASS INDEX: 40.34 KG/M2 | WEIGHT: 251 LBS | HEART RATE: 81 BPM | HEIGHT: 66 IN | OXYGEN SATURATION: 97 %

## 2022-04-13 DIAGNOSIS — M76.822 TIBIALIS POSTERIOR TENDONITIS, LEFT: Primary | ICD-10-CM

## 2022-04-13 DIAGNOSIS — M17.12 PRIMARY OSTEOARTHRITIS OF LEFT KNEE: ICD-10-CM

## 2022-04-13 DIAGNOSIS — I89.0 LYMPHEDEMA: ICD-10-CM

## 2022-04-13 PROCEDURE — 99214 OFFICE O/P EST MOD 30 MIN: CPT | Performed by: PHYSICAL MEDICINE & REHABILITATION

## 2022-04-13 PROCEDURE — 3074F SYST BP LT 130 MM HG: CPT | Performed by: PHYSICAL MEDICINE & REHABILITATION

## 2022-04-13 PROCEDURE — 3008F BODY MASS INDEX DOCD: CPT | Performed by: PHYSICAL MEDICINE & REHABILITATION

## 2022-04-13 PROCEDURE — 3079F DIAST BP 80-89 MM HG: CPT | Performed by: PHYSICAL MEDICINE & REHABILITATION

## 2022-04-13 NOTE — PATIENT INSTRUCTIONS
-Voltaren gel 3-4 x day   -Ice regularly 3-4 x day  -Start PT and home exercises  -Start tall boot  -Follow up in 4 weeks  -If no better, will discuss MRI and possible injection options

## 2022-04-20 ENCOUNTER — TELEPHONE (OUTPATIENT)
Dept: PHYSICAL MEDICINE AND REHAB | Facility: CLINIC | Age: 52
End: 2022-04-20

## 2022-05-17 ENCOUNTER — NURSE ONLY (OUTPATIENT)
Dept: FAMILY MEDICINE CLINIC | Facility: CLINIC | Age: 52
End: 2022-05-17
Payer: COMMERCIAL

## 2022-05-17 DIAGNOSIS — Z23 NEED FOR SHINGLES VACCINE: Primary | ICD-10-CM

## 2022-05-17 PROCEDURE — 90750 HZV VACC RECOMBINANT IM: CPT | Performed by: FAMILY MEDICINE

## 2022-05-17 PROCEDURE — 90471 IMMUNIZATION ADMIN: CPT | Performed by: FAMILY MEDICINE

## 2022-05-23 ENCOUNTER — OFFICE VISIT (OUTPATIENT)
Dept: FAMILY MEDICINE CLINIC | Facility: CLINIC | Age: 52
End: 2022-05-23
Payer: COMMERCIAL

## 2022-05-23 VITALS
WEIGHT: 251 LBS | SYSTOLIC BLOOD PRESSURE: 110 MMHG | DIASTOLIC BLOOD PRESSURE: 71 MMHG | HEART RATE: 87 BPM | RESPIRATION RATE: 18 BRPM | HEIGHT: 66 IN | BODY MASS INDEX: 40.34 KG/M2

## 2022-05-23 DIAGNOSIS — B07.9 VIRAL WART ON FINGER: Primary | ICD-10-CM

## 2022-05-23 PROCEDURE — 3078F DIAST BP <80 MM HG: CPT | Performed by: FAMILY MEDICINE

## 2022-05-23 PROCEDURE — 3008F BODY MASS INDEX DOCD: CPT | Performed by: FAMILY MEDICINE

## 2022-05-23 PROCEDURE — 99213 OFFICE O/P EST LOW 20 MIN: CPT | Performed by: FAMILY MEDICINE

## 2022-05-23 PROCEDURE — 3074F SYST BP LT 130 MM HG: CPT | Performed by: FAMILY MEDICINE

## 2022-06-13 ENCOUNTER — TELEPHONE (OUTPATIENT)
Dept: PHYSICAL THERAPY | Facility: HOSPITAL | Age: 52
End: 2022-06-13

## 2022-06-14 ENCOUNTER — OFFICE VISIT (OUTPATIENT)
Dept: PHYSICAL THERAPY | Facility: HOSPITAL | Age: 52
End: 2022-06-14
Attending: FAMILY MEDICINE
Payer: COMMERCIAL

## 2022-06-14 DIAGNOSIS — I89.0 LYMPHEDEMA: ICD-10-CM

## 2022-06-14 PROCEDURE — 97140 MANUAL THERAPY 1/> REGIONS: CPT | Performed by: PHYSICAL THERAPIST

## 2022-06-14 PROCEDURE — 97163 PT EVAL HIGH COMPLEX 45 MIN: CPT | Performed by: PHYSICAL THERAPIST

## 2022-06-16 ENCOUNTER — LAB ENCOUNTER (OUTPATIENT)
Dept: LAB | Age: 52
End: 2022-06-16
Attending: FAMILY MEDICINE
Payer: COMMERCIAL

## 2022-06-16 DIAGNOSIS — Z00.00 WELLNESS EXAMINATION: ICD-10-CM

## 2022-06-16 DIAGNOSIS — Z13.29 THYROID DISORDER SCREEN: ICD-10-CM

## 2022-06-16 DIAGNOSIS — Z13.220 LIPID SCREENING: ICD-10-CM

## 2022-06-16 LAB
CHOLEST SERPL-MCNC: 231 MG/DL (ref ?–200)
FASTING PATIENT LIPID ANSWER: YES
HDLC SERPL-MCNC: 56 MG/DL (ref 40–59)
LDLC SERPL CALC-MCNC: 160 MG/DL (ref ?–100)
NONHDLC SERPL-MCNC: 175 MG/DL (ref ?–130)
T4 FREE SERPL-MCNC: 0.8 NG/DL (ref 0.8–1.7)
TRIGL SERPL-MCNC: 88 MG/DL (ref 30–149)
TSI SER-ACNC: 11.4 MIU/ML (ref 0.36–3.74)
VLDLC SERPL CALC-MCNC: 17 MG/DL (ref 0–30)

## 2022-06-16 PROCEDURE — 80061 LIPID PANEL: CPT

## 2022-06-16 PROCEDURE — 84443 ASSAY THYROID STIM HORMONE: CPT

## 2022-06-16 PROCEDURE — 84439 ASSAY OF FREE THYROXINE: CPT

## 2022-06-16 PROCEDURE — 36415 COLL VENOUS BLD VENIPUNCTURE: CPT

## 2022-06-17 DIAGNOSIS — E03.9 ACQUIRED HYPOTHYROIDISM: Primary | ICD-10-CM

## 2022-06-17 DIAGNOSIS — E78.00 HYPERCHOLESTEREMIA: ICD-10-CM

## 2022-06-17 RX ORDER — LEVOTHYROXINE SODIUM 0.12 MG/1
125 TABLET ORAL
Qty: 30 TABLET | Refills: 1 | Status: SHIPPED | OUTPATIENT
Start: 2022-06-17

## 2022-06-22 ENCOUNTER — OFFICE VISIT (OUTPATIENT)
Dept: PHYSICAL THERAPY | Facility: HOSPITAL | Age: 52
End: 2022-06-22
Attending: FAMILY MEDICINE
Payer: COMMERCIAL

## 2022-06-22 DIAGNOSIS — I89.0 LYMPHEDEMA: ICD-10-CM

## 2022-06-22 PROCEDURE — 97140 MANUAL THERAPY 1/> REGIONS: CPT | Performed by: PHYSICAL THERAPIST

## 2022-06-23 ENCOUNTER — OFFICE VISIT (OUTPATIENT)
Dept: DERMATOLOGY CLINIC | Facility: CLINIC | Age: 52
End: 2022-06-23
Payer: COMMERCIAL

## 2022-06-23 ENCOUNTER — APPOINTMENT (OUTPATIENT)
Dept: HEMATOLOGY/ONCOLOGY | Facility: HOSPITAL | Age: 52
End: 2022-06-23
Attending: INTERNAL MEDICINE
Payer: COMMERCIAL

## 2022-06-23 DIAGNOSIS — D23.9 BENIGN NEOPLASM OF SKIN, UNSPECIFIED LOCATION: ICD-10-CM

## 2022-06-23 DIAGNOSIS — B07.9 VERRUCA(E): ICD-10-CM

## 2022-06-23 DIAGNOSIS — L82.1 SEBORRHEIC KERATOSES: ICD-10-CM

## 2022-06-23 DIAGNOSIS — D22.9 MULTIPLE NEVI: Primary | ICD-10-CM

## 2022-06-23 RX ORDER — FLUOROURACIL 50 MG/G
CREAM TOPICAL
Qty: 40 G | Refills: 0 | Status: SHIPPED | OUTPATIENT
Start: 2022-06-23

## 2022-06-24 ENCOUNTER — OFFICE VISIT (OUTPATIENT)
Dept: PHYSICAL THERAPY | Facility: HOSPITAL | Age: 52
End: 2022-06-24
Attending: FAMILY MEDICINE
Payer: COMMERCIAL

## 2022-06-24 PROCEDURE — 97110 THERAPEUTIC EXERCISES: CPT | Performed by: PHYSICAL THERAPIST

## 2022-06-24 PROCEDURE — 97140 MANUAL THERAPY 1/> REGIONS: CPT | Performed by: PHYSICAL THERAPIST

## 2022-06-27 ENCOUNTER — OFFICE VISIT (OUTPATIENT)
Dept: PHYSICAL THERAPY | Facility: HOSPITAL | Age: 52
End: 2022-06-27
Attending: FAMILY MEDICINE
Payer: COMMERCIAL

## 2022-06-27 PROCEDURE — 97140 MANUAL THERAPY 1/> REGIONS: CPT | Performed by: PHYSICAL THERAPIST

## 2022-06-27 PROCEDURE — 97110 THERAPEUTIC EXERCISES: CPT | Performed by: PHYSICAL THERAPIST

## 2022-06-29 ENCOUNTER — OFFICE VISIT (OUTPATIENT)
Dept: PHYSICAL THERAPY | Facility: HOSPITAL | Age: 52
End: 2022-06-29
Attending: FAMILY MEDICINE
Payer: COMMERCIAL

## 2022-06-29 PROCEDURE — 97140 MANUAL THERAPY 1/> REGIONS: CPT | Performed by: PHYSICAL THERAPIST

## 2022-06-29 PROCEDURE — 97110 THERAPEUTIC EXERCISES: CPT | Performed by: PHYSICAL THERAPIST

## 2022-07-01 ENCOUNTER — OFFICE VISIT (OUTPATIENT)
Dept: PHYSICAL THERAPY | Facility: HOSPITAL | Age: 52
End: 2022-07-01
Attending: FAMILY MEDICINE
Payer: COMMERCIAL

## 2022-07-01 PROCEDURE — 97110 THERAPEUTIC EXERCISES: CPT | Performed by: PHYSICAL THERAPIST

## 2022-07-01 PROCEDURE — 97140 MANUAL THERAPY 1/> REGIONS: CPT | Performed by: PHYSICAL THERAPIST

## 2022-07-05 ENCOUNTER — MED REC SCAN ONLY (OUTPATIENT)
Dept: PHYSICAL MEDICINE AND REHAB | Facility: CLINIC | Age: 52
End: 2022-07-05

## 2022-07-05 ENCOUNTER — APPOINTMENT (OUTPATIENT)
Dept: PHYSICAL THERAPY | Facility: HOSPITAL | Age: 52
End: 2022-07-05
Attending: FAMILY MEDICINE
Payer: COMMERCIAL

## 2022-07-05 ENCOUNTER — TELEPHONE (OUTPATIENT)
Dept: PHYSICAL MEDICINE AND REHAB | Facility: CLINIC | Age: 52
End: 2022-07-05

## 2022-07-05 NOTE — TELEPHONE ENCOUNTER
Phillip from [employer] came to drop off prescrioption for Pnuematic compression device flexitouch. Placed in doctors bin. -NL

## 2022-07-06 ENCOUNTER — OFFICE VISIT (OUTPATIENT)
Dept: PHYSICAL MEDICINE AND REHAB | Facility: CLINIC | Age: 52
End: 2022-07-06
Payer: COMMERCIAL

## 2022-07-06 ENCOUNTER — OFFICE VISIT (OUTPATIENT)
Dept: PHYSICAL THERAPY | Facility: HOSPITAL | Age: 52
End: 2022-07-06
Attending: FAMILY MEDICINE
Payer: COMMERCIAL

## 2022-07-06 VITALS
HEART RATE: 77 BPM | BODY MASS INDEX: 40.98 KG/M2 | OXYGEN SATURATION: 97 % | HEIGHT: 66 IN | DIASTOLIC BLOOD PRESSURE: 66 MMHG | WEIGHT: 255 LBS | SYSTOLIC BLOOD PRESSURE: 118 MMHG

## 2022-07-06 DIAGNOSIS — M17.12 PRIMARY OSTEOARTHRITIS OF LEFT KNEE: ICD-10-CM

## 2022-07-06 DIAGNOSIS — M76.822 TIBIALIS POSTERIOR TENDONITIS, LEFT: Primary | ICD-10-CM

## 2022-07-06 DIAGNOSIS — I89.0 LYMPHEDEMA: ICD-10-CM

## 2022-07-06 PROCEDURE — 99213 OFFICE O/P EST LOW 20 MIN: CPT | Performed by: PHYSICAL MEDICINE & REHABILITATION

## 2022-07-06 PROCEDURE — 3074F SYST BP LT 130 MM HG: CPT | Performed by: PHYSICAL MEDICINE & REHABILITATION

## 2022-07-06 PROCEDURE — 3078F DIAST BP <80 MM HG: CPT | Performed by: PHYSICAL MEDICINE & REHABILITATION

## 2022-07-06 PROCEDURE — 97140 MANUAL THERAPY 1/> REGIONS: CPT | Performed by: PHYSICAL THERAPIST

## 2022-07-06 PROCEDURE — 3008F BODY MASS INDEX DOCD: CPT | Performed by: PHYSICAL MEDICINE & REHABILITATION

## 2022-07-06 NOTE — TELEPHONE ENCOUNTER
Received Prescrition for Pneumatic Compression Device Flexitouch.  Placed in [id number] folder by nurse bin

## 2022-07-06 NOTE — PATIENT INSTRUCTIONS
-Continue PT and home exercises  -Ice as needed  -Advil/Aleve as needed  -Follow up as needed if any worsening symptoms

## 2022-07-08 ENCOUNTER — OFFICE VISIT (OUTPATIENT)
Dept: PHYSICAL THERAPY | Facility: HOSPITAL | Age: 52
End: 2022-07-08
Attending: FAMILY MEDICINE
Payer: COMMERCIAL

## 2022-07-08 PROCEDURE — 97140 MANUAL THERAPY 1/> REGIONS: CPT | Performed by: PHYSICAL THERAPIST

## 2022-07-11 ENCOUNTER — TELEPHONE (OUTPATIENT)
Dept: PHYSICAL MEDICINE AND REHAB | Facility: CLINIC | Age: 52
End: 2022-07-11

## 2022-07-11 ENCOUNTER — APPOINTMENT (OUTPATIENT)
Dept: PHYSICAL THERAPY | Facility: HOSPITAL | Age: 52
End: 2022-07-11
Attending: FAMILY MEDICINE
Payer: COMMERCIAL

## 2022-07-13 ENCOUNTER — OFFICE VISIT (OUTPATIENT)
Dept: PHYSICAL THERAPY | Facility: HOSPITAL | Age: 52
End: 2022-07-13
Attending: FAMILY MEDICINE
Payer: COMMERCIAL

## 2022-07-13 PROCEDURE — 97140 MANUAL THERAPY 1/> REGIONS: CPT | Performed by: PHYSICAL THERAPIST

## 2022-07-14 NOTE — TELEPHONE ENCOUNTER
Received call for compression garments, paperwork needs to be corrected, they will fax paperwork again.  -NL

## 2022-07-15 ENCOUNTER — APPOINTMENT (OUTPATIENT)
Dept: PHYSICAL THERAPY | Facility: HOSPITAL | Age: 52
End: 2022-07-15
Attending: FAMILY MEDICINE
Payer: COMMERCIAL

## 2022-07-18 ENCOUNTER — APPOINTMENT (OUTPATIENT)
Dept: PHYSICAL THERAPY | Facility: HOSPITAL | Age: 52
End: 2022-07-18
Attending: FAMILY MEDICINE
Payer: COMMERCIAL

## 2022-07-18 DIAGNOSIS — E03.9 ACQUIRED HYPOTHYROIDISM: ICD-10-CM

## 2022-07-18 RX ORDER — LEVOTHYROXINE SODIUM 0.12 MG/1
125 TABLET ORAL
Qty: 30 TABLET | Refills: 0 | Status: SHIPPED | OUTPATIENT
Start: 2022-07-18 | End: 2022-09-19

## 2022-07-20 ENCOUNTER — OFFICE VISIT (OUTPATIENT)
Dept: PHYSICAL THERAPY | Facility: HOSPITAL | Age: 52
End: 2022-07-20
Attending: FAMILY MEDICINE
Payer: COMMERCIAL

## 2022-07-22 ENCOUNTER — APPOINTMENT (OUTPATIENT)
Dept: PHYSICAL THERAPY | Facility: HOSPITAL | Age: 52
End: 2022-07-22
Attending: FAMILY MEDICINE
Payer: COMMERCIAL

## 2022-07-25 ENCOUNTER — APPOINTMENT (OUTPATIENT)
Dept: PHYSICAL THERAPY | Facility: HOSPITAL | Age: 52
End: 2022-07-25
Attending: FAMILY MEDICINE
Payer: COMMERCIAL

## 2022-07-26 ENCOUNTER — TELEPHONE (OUTPATIENT)
Dept: PHYSICAL THERAPY | Facility: HOSPITAL | Age: 52
End: 2022-07-26

## 2022-07-27 ENCOUNTER — APPOINTMENT (OUTPATIENT)
Dept: PHYSICAL THERAPY | Facility: HOSPITAL | Age: 52
End: 2022-07-27
Attending: FAMILY MEDICINE
Payer: COMMERCIAL

## 2022-07-27 ENCOUNTER — TELEPHONE (OUTPATIENT)
Dept: PHYSICAL THERAPY | Facility: HOSPITAL | Age: 52
End: 2022-07-27

## 2022-07-27 NOTE — TELEPHONE ENCOUNTER
Spoke with Clive Morton to explain that her therapist will not be in tomorrow. We will need to cancel her PT appointment. Her therapist will contact her to reschedule if needed.

## 2022-07-29 ENCOUNTER — APPOINTMENT (OUTPATIENT)
Dept: PHYSICAL THERAPY | Facility: HOSPITAL | Age: 52
End: 2022-07-29
Attending: FAMILY MEDICINE
Payer: COMMERCIAL

## 2022-08-01 ENCOUNTER — OFFICE VISIT (OUTPATIENT)
Dept: PHYSICAL THERAPY | Facility: HOSPITAL | Age: 52
End: 2022-08-01
Attending: PHYSICAL MEDICINE & REHABILITATION
Payer: COMMERCIAL

## 2022-08-01 PROCEDURE — 97140 MANUAL THERAPY 1/> REGIONS: CPT | Performed by: PHYSICAL THERAPIST

## 2022-08-05 ENCOUNTER — LAB ENCOUNTER (OUTPATIENT)
Dept: LAB | Age: 52
End: 2022-08-05
Attending: FAMILY MEDICINE
Payer: COMMERCIAL

## 2022-08-05 DIAGNOSIS — E03.9 ACQUIRED HYPOTHYROIDISM: ICD-10-CM

## 2022-08-05 DIAGNOSIS — E78.00 HYPERCHOLESTEREMIA: ICD-10-CM

## 2022-08-05 LAB
CHOLEST SERPL-MCNC: 114 MG/DL (ref ?–200)
FASTING PATIENT LIPID ANSWER: YES
HDLC SERPL-MCNC: 60 MG/DL (ref 40–59)
LDLC SERPL CALC-MCNC: 40 MG/DL (ref ?–100)
NONHDLC SERPL-MCNC: 54 MG/DL (ref ?–130)
TRIGL SERPL-MCNC: 64 MG/DL (ref 30–149)
TSI SER-ACNC: 0.57 MIU/ML (ref 0.36–3.74)
VLDLC SERPL CALC-MCNC: 9 MG/DL (ref 0–30)

## 2022-08-05 PROCEDURE — 84443 ASSAY THYROID STIM HORMONE: CPT

## 2022-08-05 PROCEDURE — 36415 COLL VENOUS BLD VENIPUNCTURE: CPT

## 2022-08-05 PROCEDURE — 80061 LIPID PANEL: CPT

## 2022-08-12 ENCOUNTER — OFFICE VISIT (OUTPATIENT)
Dept: PHYSICAL THERAPY | Facility: HOSPITAL | Age: 52
End: 2022-08-12
Attending: PHYSICAL MEDICINE & REHABILITATION
Payer: COMMERCIAL

## 2022-08-12 PROCEDURE — 97140 MANUAL THERAPY 1/> REGIONS: CPT | Performed by: PHYSICAL THERAPIST

## 2022-08-16 ENCOUNTER — TELEPHONE (OUTPATIENT)
Dept: PHYSICAL MEDICINE AND REHAB | Facility: CLINIC | Age: 52
End: 2022-08-16

## 2022-08-16 NOTE — TELEPHONE ENCOUNTER
Signed by Dr. Jeramy Peck. Cover letter attached, faxed, copied & sent into scanning. F: [phone]    Patient was notified.

## 2022-08-17 ENCOUNTER — MED REC SCAN ONLY (OUTPATIENT)
Dept: PHYSICAL MEDICINE AND REHAB | Facility: CLINIC | Age: 52
End: 2022-08-17

## 2022-08-18 NOTE — TELEPHONE ENCOUNTER
Per Titi Ghosh according to his internal team in order to submit the letter of Medical necessity they need a letter head on the Medical necessity forms when completed by Chandana Dougherty this can  be fax to [phone]

## 2022-08-24 ENCOUNTER — TELEPHONE (OUTPATIENT)
Dept: NEUROLOGY | Facility: CLINIC | Age: 52
End: 2022-08-24

## 2022-08-24 NOTE — TELEPHONE ENCOUNTER
Received prescription for medical compression garments form to be completed by physician. Request placed in 's folder by nurse soliman.

## 2022-08-24 NOTE — TELEPHONE ENCOUNTER
Completed paperwork, placed in Dr. Alize Ryan in office bin for signature.    -> fax -> scanning.      D:[phone]

## 2022-09-01 ENCOUNTER — TELEPHONE (OUTPATIENT)
Dept: PHYSICAL MEDICINE AND REHAB | Facility: CLINIC | Age: 52
End: 2022-09-01

## 2022-09-18 DIAGNOSIS — E03.9 ACQUIRED HYPOTHYROIDISM: ICD-10-CM

## 2022-09-19 RX ORDER — LEVOTHYROXINE SODIUM 0.12 MG/1
125 TABLET ORAL
Qty: 90 TABLET | Refills: 1 | Status: SHIPPED | OUTPATIENT
Start: 2022-09-19

## 2022-09-19 NOTE — TELEPHONE ENCOUNTER
Refill passed per [address] [place] protocol    Requested Prescriptions   Pending Prescriptions Disp Refills    LEVOTHYROXINE 125 MCG Oral Tab [Pharmacy Med Name: levothyroxine 125 mcg tablet] 30 tablet 0     Sig: Take 1 tablet (125 mcg total) by mouth before breakfast.        Thyroid Medication Protocol Passed - 9/18/2022  3:59 PM        Passed - TSH in past 12 months        Passed - Last TSH value is normal     Lab Results   Component Value Date    TSH 0.567 08/05/2022    [facility] 1.67 03/20/2014                 Passed - In person appointment or virtual visit in the past 12 mos or appointment in next 3 mos       Recent Outpatient Visits              1 month ago     [address], Cheril Mc, [address]    Office Visit    1 month ago     [address], Cheril Mc, [address]    Office Visit    2 months ago     [address], [place]il Mc, [address]    Office Visit    2 months ago     [address], Cheril Mc, [address] [place] Visit    2 months ago     [address], Cheril Mc, [address]    Office Visit     Future Appointments         Provider Department Appt Notes    In 3 weeks Edu Soliman MD [facility] AND [facility] Hematology Oncology FOLLOW UP VISIT. CL  1Y    In 1 month Navya Harris, [address] [place] PPO  no c/p, no Mervat [facility]Clarence monet pcy                     Future Appointments         Provider Department Appt Notes    In 3 weeks Edu Soliman MD [facility] AND [facility] Hematology Oncology FOLLOW UP VISIT. CL  1Y    In 1 month Navya Harris, [address] [place] PPO  no c/p, no auth, [place] pcy            Recent Outpatient Visits              1 month ago     [address] [place], [address]    Office Visit    1 month ago     [facility] Navya Harris, [address]    Office Visit    2 months ago     [facility] Navya [facility], [address]    Office Visit    2 months ago     [facility] Gwen Torres [place]    Office Visit    2 months ago     [address] [place], [place]    Office Visit normal...

## 2022-09-21 ENCOUNTER — TELEPHONE (OUTPATIENT)
Dept: PHYSICAL MEDICINE AND REHAB | Facility: CLINIC | Age: 52
End: 2022-09-21

## 2022-09-28 ENCOUNTER — TELEPHONE (OUTPATIENT)
Dept: PHYSICAL MEDICINE AND REHAB | Facility: CLINIC | Age: 52
End: 2022-09-28

## 2022-09-28 NOTE — TELEPHONE ENCOUNTER
Alexy from [employer] calling inquiring about updated form. Nothing in Arnie's nurse folder.   #[phone]  Fax #'s: [phone] & [phone]

## 2022-09-28 NOTE — TELEPHONE ENCOUNTER
Corrected order to reflect the accurate duration of need to \"99\" = lifetime. Faxed back to given number by Alexy.

## 2022-09-30 ENCOUNTER — TELEPHONE (OUTPATIENT)
Dept: HEMATOLOGY/ONCOLOGY | Facility: HOSPITAL | Age: 52
End: 2022-09-30

## 2022-09-30 DIAGNOSIS — Z85.72 ENCOUNTER FOR FOLLOW-UP SURVEILLANCE OF LYMPHOMA: Primary | ICD-10-CM

## 2022-09-30 DIAGNOSIS — Z08 ENCOUNTER FOR FOLLOW-UP SURVEILLANCE OF LYMPHOMA: Primary | ICD-10-CM

## 2022-09-30 DIAGNOSIS — C84.70 ANAPLASTIC ALK-NEGATIVE LARGE CELL LYMPHOMA, UNSPECIFIED BODY REGION (HCC): ICD-10-CM

## 2022-09-30 NOTE — TELEPHONE ENCOUNTER
Returned call to [facility] added let her know to have drawn before her appointment.   She thanked me for the call

## 2022-09-30 NOTE — TELEPHONE ENCOUNTER
Patient calling and has a F/U scheduled with Dr Kelsy Forbes 10/12/2022.     She is asking if labs are needed

## 2022-10-08 ENCOUNTER — LAB ENCOUNTER (OUTPATIENT)
Dept: LAB | Age: 52
End: 2022-10-08
Attending: INTERNAL MEDICINE
Payer: COMMERCIAL

## 2022-10-08 DIAGNOSIS — Z85.72 ENCOUNTER FOR FOLLOW-UP SURVEILLANCE OF LYMPHOMA: ICD-10-CM

## 2022-10-08 DIAGNOSIS — Z08 ENCOUNTER FOR FOLLOW-UP SURVEILLANCE OF LYMPHOMA: ICD-10-CM

## 2022-10-08 DIAGNOSIS — C84.70 ANAPLASTIC ALK-NEGATIVE LARGE CELL LYMPHOMA, UNSPECIFIED BODY REGION (HCC): ICD-10-CM

## 2022-10-08 LAB
ALBUMIN SERPL-MCNC: 3.6 G/DL (ref 3.4–5)
ALBUMIN/GLOB SERPL: 1.1 {RATIO} (ref 1–2)
ALP LIVER SERPL-CCNC: 79 U/L
ALT SERPL-CCNC: 37 U/L
ANION GAP SERPL CALC-SCNC: 5 MMOL/L (ref 0–18)
AST SERPL-CCNC: 21 U/L (ref 15–37)
BASOPHILS # BLD AUTO: 0.05 X10(3) UL (ref 0–0.2)
BASOPHILS NFR BLD AUTO: 0.8 %
BILIRUB SERPL-MCNC: 0.6 MG/DL (ref 0.1–2)
BUN BLD-MCNC: 10 MG/DL (ref 7–18)
BUN/CREAT SERPL: 14.9 (ref 10–20)
CALCIUM BLD-MCNC: 9.4 MG/DL (ref 8.5–10.1)
CHLORIDE SERPL-SCNC: 109 MMOL/L (ref 98–112)
CO2 SERPL-SCNC: 29 MMOL/L (ref 21–32)
CREAT BLD-MCNC: 0.67 MG/DL
DEPRECATED RDW RBC AUTO: 44.2 FL (ref 35.1–46.3)
EOSINOPHIL # BLD AUTO: 0.12 X10(3) UL (ref 0–0.7)
EOSINOPHIL NFR BLD AUTO: 2 %
ERYTHROCYTE [DISTWIDTH] IN BLOOD BY AUTOMATED COUNT: 13.2 % (ref 11–15)
FASTING STATUS PATIENT QL REPORTED: YES
GFR SERPLBLD BASED ON 1.73 SQ M-ARVRAT: 104 ML/MIN/1.73M2 (ref 60–?)
GLOBULIN PLAS-MCNC: 3.2 G/DL (ref 2.8–4.4)
GLUCOSE BLD-MCNC: 98 MG/DL (ref 70–99)
HCT VFR BLD AUTO: 46.1 %
HGB BLD-MCNC: 14.9 G/DL
IMM GRANULOCYTES # BLD AUTO: 0.01 X10(3) UL (ref 0–1)
IMM GRANULOCYTES NFR BLD: 0.2 %
LDH SERPL L TO P-CCNC: 166 U/L
LYMPHOCYTES # BLD AUTO: 2.34 X10(3) UL (ref 1–4)
LYMPHOCYTES NFR BLD AUTO: 39.3 %
MCH RBC QN AUTO: 29.4 PG (ref 26–34)
MCHC RBC AUTO-ENTMCNC: 32.3 G/DL (ref 31–37)
MCV RBC AUTO: 90.9 FL
MONOCYTES # BLD AUTO: 0.43 X10(3) UL (ref 0.1–1)
MONOCYTES NFR BLD AUTO: 7.2 %
NEUTROPHILS # BLD AUTO: 3 X10 (3) UL (ref 1.5–7.7)
NEUTROPHILS # BLD AUTO: 3 X10(3) UL (ref 1.5–7.7)
NEUTROPHILS NFR BLD AUTO: 50.5 %
OSMOLALITY SERPL CALC.SUM OF ELEC: 295 MOSM/KG (ref 275–295)
PLATELET # BLD AUTO: 224 10(3)UL (ref 150–450)
POTASSIUM SERPL-SCNC: 4 MMOL/L (ref 3.5–5.1)
PROT SERPL-MCNC: 6.8 G/DL (ref 6.4–8.2)
RBC # BLD AUTO: 5.07 X10(6)UL
SODIUM SERPL-SCNC: 143 MMOL/L (ref 136–145)
WBC # BLD AUTO: 6 X10(3) UL (ref 4–11)

## 2022-10-08 PROCEDURE — 85025 COMPLETE CBC W/AUTO DIFF WBC: CPT

## 2022-10-08 PROCEDURE — 83615 LACTATE (LD) (LDH) ENZYME: CPT

## 2022-10-08 PROCEDURE — 36415 COLL VENOUS BLD VENIPUNCTURE: CPT

## 2022-10-08 PROCEDURE — 80053 COMPREHEN METABOLIC PANEL: CPT

## 2022-10-12 ENCOUNTER — OFFICE VISIT (OUTPATIENT)
Dept: HEMATOLOGY/ONCOLOGY | Facility: HOSPITAL | Age: 52
End: 2022-10-12
Attending: INTERNAL MEDICINE
Payer: COMMERCIAL

## 2022-10-12 VITALS
BODY MASS INDEX: 40.02 KG/M2 | WEIGHT: 249 LBS | TEMPERATURE: 98 F | SYSTOLIC BLOOD PRESSURE: 118 MMHG | HEIGHT: 66 IN | DIASTOLIC BLOOD PRESSURE: 83 MMHG | HEART RATE: 74 BPM | RESPIRATION RATE: 18 BRPM | OXYGEN SATURATION: 98 %

## 2022-10-12 DIAGNOSIS — Z85.72 ENCOUNTER FOR FOLLOW-UP SURVEILLANCE OF LYMPHOMA: Primary | ICD-10-CM

## 2022-10-12 DIAGNOSIS — Z08 ENCOUNTER FOR FOLLOW-UP SURVEILLANCE OF LYMPHOMA: Primary | ICD-10-CM

## 2022-10-12 DIAGNOSIS — Z79.01 CURRENT USE OF ANTICOAGULANT THERAPY: ICD-10-CM

## 2022-10-12 DIAGNOSIS — Z86.718 HISTORY OF DVT (DEEP VEIN THROMBOSIS): ICD-10-CM

## 2022-10-12 PROCEDURE — 99214 OFFICE O/P EST MOD 30 MIN: CPT | Performed by: INTERNAL MEDICINE

## 2022-10-26 RX ORDER — ROSUVASTATIN CALCIUM 40 MG/1
TABLET, COATED ORAL
Qty: 90 TABLET | Refills: 1 | Status: SHIPPED | OUTPATIENT
Start: 2022-10-26

## 2022-11-08 ENCOUNTER — OFFICE VISIT (OUTPATIENT)
Dept: PHYSICAL THERAPY | Facility: HOSPITAL | Age: 52
End: 2022-11-08
Attending: PHYSICAL MEDICINE & REHABILITATION
Payer: COMMERCIAL

## 2022-11-08 ENCOUNTER — TELEMEDICINE (OUTPATIENT)
Dept: TELEHEALTH | Age: 52
End: 2022-11-08

## 2022-11-08 VITALS — BODY MASS INDEX: 39.7 KG/M2 | WEIGHT: 247 LBS | HEIGHT: 66 IN

## 2022-11-08 DIAGNOSIS — J01.10 ACUTE FRONTAL SINUSITIS, RECURRENCE NOT SPECIFIED: Primary | ICD-10-CM

## 2022-11-08 DIAGNOSIS — R05.1 ACUTE COUGH: ICD-10-CM

## 2022-11-08 PROCEDURE — 99213 OFFICE O/P EST LOW 20 MIN: CPT | Performed by: NURSE PRACTITIONER

## 2022-11-08 PROCEDURE — 97140 MANUAL THERAPY 1/> REGIONS: CPT | Performed by: PHYSICAL THERAPIST

## 2022-11-08 RX ORDER — AMOXICILLIN AND CLAVULANATE POTASSIUM 875; 125 MG/1; MG/1
1 TABLET, FILM COATED ORAL 2 TIMES DAILY WITH MEALS
Qty: 20 TABLET | Refills: 0 | Status: SHIPPED | OUTPATIENT
Start: 2022-11-08 | End: 2022-11-18

## 2022-11-08 RX ORDER — FLUTICASONE PROPIONATE 50 MCG
2 SPRAY, SUSPENSION (ML) NASAL DAILY
Qty: 1 EACH | Refills: 0 | Status: SHIPPED | OUTPATIENT
Start: 2022-11-08

## 2022-11-08 RX ORDER — BENZONATATE 200 MG/1
200 CAPSULE ORAL 3 TIMES DAILY PRN
Qty: 20 CAPSULE | Refills: 0 | Status: SHIPPED | OUTPATIENT
Start: 2022-11-08 | End: 2022-11-15

## 2022-11-11 ENCOUNTER — MED REC SCAN ONLY (OUTPATIENT)
Dept: PHYSICAL MEDICINE AND REHAB | Facility: CLINIC | Age: 52
End: 2022-11-11

## 2023-02-13 DIAGNOSIS — E03.9 ACQUIRED HYPOTHYROIDISM: ICD-10-CM

## 2023-02-13 RX ORDER — LEVOTHYROXINE SODIUM 0.12 MG/1
125 TABLET ORAL
Qty: 90 TABLET | Refills: 1 | Status: SHIPPED | OUTPATIENT
Start: 2023-02-13

## 2023-02-14 NOTE — TELEPHONE ENCOUNTER
Refill passed per [facility] protocol.     .  Requested Prescriptions   Pending Prescriptions Disp Refills    LEVOTHYROXINE 125 MCG Oral Tab [Pharmacy Med Name: levothyroxine 125 mcg tablet] 90 tablet 1     Sig: Take 1 tablet (125 mcg total) by mouth before breakfast.       Thyroid Medication Protocol Passed - 2/13/2023  8:07 AM        Passed - TSH in past 12 months        Passed - Last TSH value is normal     Lab Results   Component Value Date    TSH 0.567 08/05/2022    [facility] 1.67 03/20/2014                 Passed - In person appointment or virtual visit in the past 12 mos or appointment in next 3 mos     Recent Outpatient Visits              3 months ago Acute frontal sinusitis, recurrence not specified    Naomi Purvis, Virtual Visit CAROLANN Garduno    Telemedicine    3 months ago     [facility] Barb John, PT    Office Visit    4 months ago Encounter for follow-up surveillance of [address] [place] [address] Hematology Oncology Bruce Cisneros MD    Office Visit    6 months ago     [facility] Barb John, [place]    Office Visit    6 months ago     [address] [place], [place]    Office Visit          Future Appointments         Provider Department Appt Notes    In 4 months MD Naomi Cameron, [address] [facility], [address] [place] [address] Drive up    In 8 months Darwin Nguyen 19 Hematology Oncology 1yr F/U                    Recent Outpatient Visits              3 months ago Acute frontal sinusitis, recurrence not specified    [facility], Virtual Visit CAROLANN Garduno    Telemedicine    3 months ago     [address],     Office Visit    4 months ago Encounter for follow-up surveillance of [address] [place] [address] Hematology Oncology Bruce Cisneros MD    Office Visit    6 months ago     [facility] Talia Hitchcock, PT    Office Visit    6 months ago     [address], [place]    Office Visit            Future Appointments         Provider Department Appt Notes    In 4 months Kyle Kohli MD [address], [address] [facility], [address] [place] [address] Drive up    In 8 months Darwin Bejarano 19 Hematology Oncology 1yr F/U

## 2023-02-25 ENCOUNTER — PATIENT MESSAGE (OUTPATIENT)
Dept: OBGYN CLINIC | Facility: CLINIC | Age: 53
End: 2023-02-25

## 2023-02-25 DIAGNOSIS — Z12.31 ENCOUNTER FOR SCREENING MAMMOGRAM FOR MALIGNANT NEOPLASM OF BREAST: Primary | ICD-10-CM

## 2023-02-25 NOTE — TELEPHONE ENCOUNTER
From: Rose Marie Moran  To: Angelika Jones MD  Sent: 2/25/2023 8:17 AM CST  Subject: Mammogram     I am unable to get an appointment until the summer should I wait until then to get my mammogram or can I stay on schedule and go now and review the results at the summer appointment?

## 2023-02-25 NOTE — TELEPHONE ENCOUNTER
Lmtcb to assist in scheduling a sooner annual appt. LA 3/28/22  Last pap normal HPV neg 3/1/21  Last mathew screening 3/28/22 incomplete. AVs right breast 3/30/22 benign findings recs for routine annual mammogram.   Has next annual scheduled on 7/10. Screening mathew Mammo order placed and pt notified via [employer].

## 2023-03-11 DIAGNOSIS — Z86.718 HISTORY OF DVT (DEEP VEIN THROMBOSIS): ICD-10-CM

## 2023-03-11 DIAGNOSIS — Z79.01 CURRENT USE OF ANTICOAGULANT THERAPY: ICD-10-CM

## 2023-03-13 DIAGNOSIS — Z79.01 CURRENT USE OF ANTICOAGULANT THERAPY: ICD-10-CM

## 2023-03-13 DIAGNOSIS — Z86.718 HISTORY OF DVT (DEEP VEIN THROMBOSIS): ICD-10-CM

## 2023-03-15 ENCOUNTER — OFFICE VISIT (OUTPATIENT)
Dept: FAMILY MEDICINE CLINIC | Facility: CLINIC | Age: 53
End: 2023-03-15

## 2023-03-15 VITALS
SYSTOLIC BLOOD PRESSURE: 122 MMHG | HEART RATE: 76 BPM | WEIGHT: 249 LBS | BODY MASS INDEX: 40.02 KG/M2 | HEIGHT: 66 IN | DIASTOLIC BLOOD PRESSURE: 77 MMHG | RESPIRATION RATE: 17 BRPM

## 2023-03-15 DIAGNOSIS — E78.00 HYPERCHOLESTEREMIA: ICD-10-CM

## 2023-03-15 DIAGNOSIS — Z00.00 WELLNESS EXAMINATION: Primary | ICD-10-CM

## 2023-03-15 DIAGNOSIS — Z13.0 SCREENING FOR DEFICIENCY ANEMIA: ICD-10-CM

## 2023-03-15 DIAGNOSIS — E03.9 ACQUIRED HYPOTHYROIDISM: ICD-10-CM

## 2023-03-15 PROCEDURE — 3078F DIAST BP <80 MM HG: CPT | Performed by: FAMILY MEDICINE

## 2023-03-15 PROCEDURE — 3008F BODY MASS INDEX DOCD: CPT | Performed by: FAMILY MEDICINE

## 2023-03-15 PROCEDURE — 3074F SYST BP LT 130 MM HG: CPT | Performed by: FAMILY MEDICINE

## 2023-03-15 PROCEDURE — 99396 PREV VISIT EST AGE 40-64: CPT | Performed by: FAMILY MEDICINE

## 2023-04-01 ENCOUNTER — LAB ENCOUNTER (OUTPATIENT)
Dept: LAB | Age: 53
End: 2023-04-01
Attending: FAMILY MEDICINE
Payer: COMMERCIAL

## 2023-04-01 DIAGNOSIS — Z00.00 WELLNESS EXAMINATION: ICD-10-CM

## 2023-04-01 DIAGNOSIS — E78.00 HYPERCHOLESTEREMIA: ICD-10-CM

## 2023-04-01 DIAGNOSIS — R73.09 ELEVATED GLUCOSE: ICD-10-CM

## 2023-04-01 DIAGNOSIS — Z13.0 SCREENING FOR DEFICIENCY ANEMIA: ICD-10-CM

## 2023-04-01 DIAGNOSIS — E03.9 ACQUIRED HYPOTHYROIDISM: ICD-10-CM

## 2023-04-01 LAB
ALBUMIN SERPL-MCNC: 3.5 G/DL (ref 3.4–5)
ALBUMIN/GLOB SERPL: 1.1 {RATIO} (ref 1–2)
ALP LIVER SERPL-CCNC: 80 U/L
ALT SERPL-CCNC: 29 U/L
ANION GAP SERPL CALC-SCNC: 8 MMOL/L (ref 0–18)
AST SERPL-CCNC: 20 U/L (ref 15–37)
BASOPHILS # BLD AUTO: 0.06 X10(3) UL (ref 0–0.2)
BASOPHILS NFR BLD AUTO: 1 %
BILIRUB SERPL-MCNC: 0.4 MG/DL (ref 0.1–2)
BUN BLD-MCNC: 10 MG/DL (ref 7–18)
BUN/CREAT SERPL: 14.1 (ref 10–20)
CALCIUM BLD-MCNC: 9.2 MG/DL (ref 8.5–10.1)
CHLORIDE SERPL-SCNC: 109 MMOL/L (ref 98–112)
CHOLEST SERPL-MCNC: 136 MG/DL (ref ?–200)
CO2 SERPL-SCNC: 27 MMOL/L (ref 21–32)
CREAT BLD-MCNC: 0.71 MG/DL
DEPRECATED RDW RBC AUTO: 45.1 FL (ref 35.1–46.3)
EOSINOPHIL # BLD AUTO: 0.18 X10(3) UL (ref 0–0.7)
EOSINOPHIL NFR BLD AUTO: 3.1 %
ERYTHROCYTE [DISTWIDTH] IN BLOOD BY AUTOMATED COUNT: 13.7 % (ref 11–15)
FASTING PATIENT LIPID ANSWER: YES
FASTING STATUS PATIENT QL REPORTED: YES
GFR SERPLBLD BASED ON 1.73 SQ M-ARVRAT: 100 ML/MIN/1.73M2 (ref 60–?)
GLOBULIN PLAS-MCNC: 3.3 G/DL (ref 2.8–4.4)
GLUCOSE BLD-MCNC: 111 MG/DL (ref 70–99)
HCT VFR BLD AUTO: 46.6 %
HDLC SERPL-MCNC: 66 MG/DL (ref 40–59)
HGB BLD-MCNC: 15.2 G/DL
IMM GRANULOCYTES # BLD AUTO: 0.01 X10(3) UL (ref 0–1)
IMM GRANULOCYTES NFR BLD: 0.2 %
LDLC SERPL CALC-MCNC: 58 MG/DL (ref ?–100)
LYMPHOCYTES # BLD AUTO: 2.56 X10(3) UL (ref 1–4)
LYMPHOCYTES NFR BLD AUTO: 44.3 %
MCH RBC QN AUTO: 29.2 PG (ref 26–34)
MCHC RBC AUTO-ENTMCNC: 32.6 G/DL (ref 31–37)
MCV RBC AUTO: 89.4 FL
MONOCYTES # BLD AUTO: 0.48 X10(3) UL (ref 0.1–1)
MONOCYTES NFR BLD AUTO: 8.3 %
NEUTROPHILS # BLD AUTO: 2.49 X10 (3) UL (ref 1.5–7.7)
NEUTROPHILS # BLD AUTO: 2.49 X10(3) UL (ref 1.5–7.7)
NEUTROPHILS NFR BLD AUTO: 43.1 %
NONHDLC SERPL-MCNC: 70 MG/DL (ref ?–130)
OSMOLALITY SERPL CALC.SUM OF ELEC: 298 MOSM/KG (ref 275–295)
PLATELET # BLD AUTO: 229 10(3)UL (ref 150–450)
POTASSIUM SERPL-SCNC: 4 MMOL/L (ref 3.5–5.1)
PROT SERPL-MCNC: 6.8 G/DL (ref 6.4–8.2)
RBC # BLD AUTO: 5.21 X10(6)UL
SODIUM SERPL-SCNC: 144 MMOL/L (ref 136–145)
TRIGL SERPL-MCNC: 55 MG/DL (ref 30–149)
TSI SER-ACNC: 2.4 MIU/ML (ref 0.36–3.74)
VLDLC SERPL CALC-MCNC: 8 MG/DL (ref 0–30)
WBC # BLD AUTO: 5.8 X10(3) UL (ref 4–11)

## 2023-04-01 PROCEDURE — 80061 LIPID PANEL: CPT

## 2023-04-01 PROCEDURE — 36415 COLL VENOUS BLD VENIPUNCTURE: CPT

## 2023-04-01 PROCEDURE — 83036 HEMOGLOBIN GLYCOSYLATED A1C: CPT

## 2023-04-01 PROCEDURE — 85025 COMPLETE CBC W/AUTO DIFF WBC: CPT

## 2023-04-01 PROCEDURE — 80053 COMPREHEN METABOLIC PANEL: CPT

## 2023-04-01 PROCEDURE — 84443 ASSAY THYROID STIM HORMONE: CPT

## 2023-04-03 ENCOUNTER — HOSPITAL ENCOUNTER (OUTPATIENT)
Dept: MAMMOGRAPHY | Age: 53
Discharge: HOME | End: 2023-04-03
Attending: OBSTETRICS & GYNECOLOGY
Payer: COMMERCIAL

## 2023-04-03 DIAGNOSIS — Z12.31 ENCOUNTER FOR SCREENING MAMMOGRAM FOR MALIGNANT NEOPLASM OF BREAST: ICD-10-CM

## 2023-04-03 DIAGNOSIS — R73.09 ELEVATED GLUCOSE: Primary | ICD-10-CM

## 2023-04-03 LAB
EST. AVERAGE GLUCOSE BLD GHB EST-MCNC: 126 MG/DL (ref 68–126)
HBA1C MFR BLD: 6 % (ref ?–5.7)

## 2023-04-03 PROCEDURE — 77063 BREAST TOMOSYNTHESIS BI: CPT | Performed by: OBSTETRICS & GYNECOLOGY

## 2023-04-03 PROCEDURE — 77067 SCR MAMMO BI INCL CAD: CPT | Performed by: OBSTETRICS & GYNECOLOGY

## 2023-04-04 PROBLEM — Z86.39 HISTORY OF DIABETES MELLITUS: Status: ACTIVE | Noted: 2023-04-04

## 2023-05-01 ENCOUNTER — OFFICE VISIT (OUTPATIENT)
Dept: OBGYN CLINIC | Facility: CLINIC | Age: 53
End: 2023-05-01

## 2023-05-01 VITALS
HEART RATE: 91 BPM | WEIGHT: 251.81 LBS | BODY MASS INDEX: 41 KG/M2 | SYSTOLIC BLOOD PRESSURE: 111 MMHG | DIASTOLIC BLOOD PRESSURE: 81 MMHG

## 2023-05-01 DIAGNOSIS — Z01.419 ENCOUNTER FOR GYNECOLOGICAL EXAMINATION: Primary | ICD-10-CM

## 2023-05-01 PROCEDURE — 3074F SYST BP LT 130 MM HG: CPT | Performed by: OBSTETRICS & GYNECOLOGY

## 2023-05-01 PROCEDURE — 3079F DIAST BP 80-89 MM HG: CPT | Performed by: OBSTETRICS & GYNECOLOGY

## 2023-05-01 PROCEDURE — 99396 PREV VISIT EST AGE 40-64: CPT | Performed by: OBSTETRICS & GYNECOLOGY

## 2023-05-06 ENCOUNTER — APPOINTMENT (OUTPATIENT)
Dept: GENERAL RADIOLOGY | Facility: HOSPITAL | Age: 53
End: 2023-05-06
Attending: NURSE PRACTITIONER
Payer: COMMERCIAL

## 2023-05-06 ENCOUNTER — HOSPITAL ENCOUNTER (EMERGENCY)
Facility: HOSPITAL | Age: 53
Discharge: HOME | End: 2023-05-07
Payer: COMMERCIAL

## 2023-05-06 DIAGNOSIS — M67.40 GANGLION CYST: ICD-10-CM

## 2023-05-06 DIAGNOSIS — M79.89 LEFT ARM SWELLING: Primary | ICD-10-CM

## 2023-05-06 PROCEDURE — 36415 COLL VENOUS BLD VENIPUNCTURE: CPT

## 2023-05-06 PROCEDURE — 99285 EMERGENCY DEPT VISIT HI MDM: CPT

## 2023-05-06 PROCEDURE — 99284 EMERGENCY DEPT VISIT MOD MDM: CPT

## 2023-05-06 PROCEDURE — 73110 X-RAY EXAM OF WRIST: CPT | Performed by: NURSE PRACTITIONER

## 2023-05-07 ENCOUNTER — APPOINTMENT (OUTPATIENT)
Dept: ULTRASOUND IMAGING | Facility: HOSPITAL | Age: 53
End: 2023-05-07
Attending: NURSE PRACTITIONER
Payer: COMMERCIAL

## 2023-05-07 ENCOUNTER — APPOINTMENT (OUTPATIENT)
Dept: GENERAL RADIOLOGY | Facility: HOSPITAL | Age: 53
End: 2023-05-07
Attending: NURSE PRACTITIONER
Payer: COMMERCIAL

## 2023-05-07 VITALS
HEIGHT: 65 IN | RESPIRATION RATE: 18 BRPM | WEIGHT: 230 LBS | DIASTOLIC BLOOD PRESSURE: 93 MMHG | SYSTOLIC BLOOD PRESSURE: 128 MMHG | BODY MASS INDEX: 38.32 KG/M2 | TEMPERATURE: 98 F | HEART RATE: 79 BPM | OXYGEN SATURATION: 98 %

## 2023-05-07 LAB
BASOPHILS # BLD AUTO: 0.05 X10(3) UL (ref 0–0.2)
BASOPHILS NFR BLD AUTO: 0.7 %
DEPRECATED RDW RBC AUTO: 45.1 FL (ref 35.1–46.3)
EOSINOPHIL # BLD AUTO: 0.19 X10(3) UL (ref 0–0.7)
EOSINOPHIL NFR BLD AUTO: 2.5 %
ERYTHROCYTE [DISTWIDTH] IN BLOOD BY AUTOMATED COUNT: 14 % (ref 11–15)
HCT VFR BLD AUTO: 44.5 %
HGB BLD-MCNC: 14.7 G/DL
IMM GRANULOCYTES # BLD AUTO: 0.02 X10(3) UL (ref 0–1)
IMM GRANULOCYTES NFR BLD: 0.3 %
LYMPHOCYTES # BLD AUTO: 3.73 X10(3) UL (ref 1–4)
LYMPHOCYTES NFR BLD AUTO: 49.4 %
MCH RBC QN AUTO: 29.3 PG (ref 26–34)
MCHC RBC AUTO-ENTMCNC: 33 G/DL (ref 31–37)
MCV RBC AUTO: 88.8 FL
MONOCYTES # BLD AUTO: 0.67 X10(3) UL (ref 0.1–1)
MONOCYTES NFR BLD AUTO: 8.9 %
NEUTROPHILS # BLD AUTO: 2.89 X10 (3) UL (ref 1.5–7.7)
NEUTROPHILS # BLD AUTO: 2.89 X10(3) UL (ref 1.5–7.7)
NEUTROPHILS NFR BLD AUTO: 38.2 %
PLATELET # BLD AUTO: 223 10(3)UL (ref 150–450)
RBC # BLD AUTO: 5.01 X10(6)UL
WBC # BLD AUTO: 7.6 X10(3) UL (ref 4–11)

## 2023-05-07 PROCEDURE — 85025 COMPLETE CBC W/AUTO DIFF WBC: CPT | Performed by: NURSE PRACTITIONER

## 2023-05-07 PROCEDURE — 71045 X-RAY EXAM CHEST 1 VIEW: CPT | Performed by: NURSE PRACTITIONER

## 2023-05-07 PROCEDURE — 93971 EXTREMITY STUDY: CPT | Performed by: NURSE PRACTITIONER

## 2023-05-07 NOTE — ED INITIAL ASSESSMENT (HPI)
Pt c/o of left wrist pain that started yesterday denies trauma, swelling noted CMS intact, pulses palpable.

## 2023-05-07 NOTE — ED PROVIDER NOTES
I reviewed the chart and discussed the case with the RAMSES. I provided a substantive portion of care for this patient. I personally performed the medical decision making for this encounter in conjunction with RAMSES. I have examined the patient and noted LUE with slightly tender mobile soft masses along ulnar wrist, mid forearm, upper arm. Ranging shoulder without difficulty. No induration. radial pulse 2+, cap refill all digits <2sec  sensation intact and equal all digits  thumb + index finger ring strength normal  abduction all digits strength normal  dorsiflexion wrist strength normal    Exam is most consistent with possible cysts, possible lipomas as noted by ultrasound although atypical for these to develop so acutely. No evidence of infection, neurovascularly intact. Patient was also lymphadenopathy. CBC with reassuring cell counts, no compressive etiology on chest x-ray. Patient given brace for comfort, continue supportive measures and follow-up with heme/onc Monday.       I agree with the following clinical impression(s):  Left arm swelling  (primary encounter diagnosis)  Ganglion cyst.

## 2023-05-07 NOTE — ED QUICK NOTES
Patient is alert and oriented x4. Showing no signs or symptoms of any respiratory distress. Ambulating independently without any assistive devices. Swollen lump to the wrist- denies trauma. Lump started yesterday and has gotten bigger. +1 radial pulse; numbness to the left pinky finger. Refused ice pack- its uncomfortable. Comfort measures in place. Family member at bedside.

## 2023-05-07 NOTE — ED QUICK NOTES
Splint put in place. Pulse strong and brisk cap refill.  Pt. C/o of numbness in her 5th finger still but is unchanged prior to placement,

## 2023-05-09 ENCOUNTER — OFFICE VISIT (OUTPATIENT)
Dept: HEMATOLOGY/ONCOLOGY | Facility: HOSPITAL | Age: 53
End: 2023-05-09
Attending: INTERNAL MEDICINE
Payer: COMMERCIAL

## 2023-05-09 VITALS
HEART RATE: 81 BPM | BODY MASS INDEX: 39.64 KG/M2 | WEIGHT: 246.63 LBS | RESPIRATION RATE: 18 BRPM | OXYGEN SATURATION: 96 % | SYSTOLIC BLOOD PRESSURE: 124 MMHG | HEIGHT: 66 IN | TEMPERATURE: 98 F | DIASTOLIC BLOOD PRESSURE: 81 MMHG

## 2023-05-09 DIAGNOSIS — Z86.718 HISTORY OF DVT (DEEP VEIN THROMBOSIS): ICD-10-CM

## 2023-05-09 DIAGNOSIS — Z08 ENCOUNTER FOR FOLLOW-UP SURVEILLANCE OF LYMPHOMA: ICD-10-CM

## 2023-05-09 DIAGNOSIS — M25.432 SWELLING OF LEFT WRIST: Primary | ICD-10-CM

## 2023-05-09 DIAGNOSIS — Z79.01 CURRENT USE OF ANTICOAGULANT THERAPY: ICD-10-CM

## 2023-05-09 DIAGNOSIS — Z85.72 ENCOUNTER FOR FOLLOW-UP SURVEILLANCE OF LYMPHOMA: ICD-10-CM

## 2023-05-09 PROCEDURE — 99213 OFFICE O/P EST LOW 20 MIN: CPT | Performed by: INTERNAL MEDICINE

## 2023-05-18 ENCOUNTER — HOSPITAL ENCOUNTER (OUTPATIENT)
Dept: CT IMAGING | Age: 53
Discharge: HOME | End: 2023-05-18
Attending: INTERNAL MEDICINE
Payer: COMMERCIAL

## 2023-05-18 DIAGNOSIS — M25.432 SWELLING OF LEFT WRIST: ICD-10-CM

## 2023-05-18 LAB
CREAT BLD-MCNC: 0.6 MG/DL
GFR SERPLBLD BASED ON 1.73 SQ M-ARVRAT: 106 ML/MIN/1.73M2 (ref 60–?)

## 2023-05-18 PROCEDURE — 82565 ASSAY OF CREATININE: CPT

## 2023-05-18 PROCEDURE — 73201 CT UPPER EXTREMITY W/DYE: CPT | Performed by: INTERNAL MEDICINE

## 2023-05-27 RX ORDER — ROSUVASTATIN CALCIUM 40 MG/1
TABLET, COATED ORAL
Qty: 90 TABLET | Refills: 1 | Status: SHIPPED | OUTPATIENT
Start: 2023-05-27

## 2023-06-10 ENCOUNTER — LAB ENCOUNTER (OUTPATIENT)
Dept: LAB | Age: 53
End: 2023-06-10
Attending: INTERNAL MEDICINE
Payer: COMMERCIAL

## 2023-06-10 DIAGNOSIS — Z85.72 ENCOUNTER FOR FOLLOW-UP SURVEILLANCE OF LYMPHOMA: ICD-10-CM

## 2023-06-10 DIAGNOSIS — Z08 ENCOUNTER FOR FOLLOW-UP SURVEILLANCE OF LYMPHOMA: ICD-10-CM

## 2023-06-10 LAB — LDH SERPL L TO P-CCNC: 158 U/L

## 2023-06-10 PROCEDURE — 83615 LACTATE (LD) (LDH) ENZYME: CPT

## 2023-06-10 PROCEDURE — 36415 COLL VENOUS BLD VENIPUNCTURE: CPT

## 2023-06-19 ENCOUNTER — OFFICE VISIT (OUTPATIENT)
Dept: FAMILY MEDICINE CLINIC | Facility: CLINIC | Age: 53
End: 2023-06-19

## 2023-06-19 VITALS
HEART RATE: 88 BPM | RESPIRATION RATE: 18 BRPM | SYSTOLIC BLOOD PRESSURE: 122 MMHG | DIASTOLIC BLOOD PRESSURE: 79 MMHG | WEIGHT: 246 LBS | HEIGHT: 66 IN | BODY MASS INDEX: 39.53 KG/M2

## 2023-06-19 DIAGNOSIS — M67.40 GANGLION CYST: Primary | ICD-10-CM

## 2023-07-13 ENCOUNTER — OFFICE VISIT (OUTPATIENT)
Dept: SURGERY | Facility: CLINIC | Age: 53
End: 2023-07-13

## 2023-07-13 DIAGNOSIS — M67.432 GANGLION OF LEFT WRIST: Primary | ICD-10-CM

## 2023-07-13 DIAGNOSIS — R29.898 LEFT HAND WEAKNESS: ICD-10-CM

## 2023-07-13 PROCEDURE — 99244 OFF/OP CNSLTJ NEW/EST MOD 40: CPT | Performed by: PLASTIC SURGERY

## 2023-07-13 NOTE — H&P
Nacho Mena is a 52year old female that presents with No chief complaint on file. Hitesh Callahan REFERRED BY:  Fred Zamarripa    Pacemaker: No  Latex Allergy: no  Coumadin: No  Plavix: No  Other anticoagulants: Xarelto  Cardiac stents: No    HAND DOMINANCE:  Right  Profession: Teacher middle school    RECONSTRUCTIVE HISTORY    SUN EXPOSURE   Current no   Past no   Sunburns no   Tanning salons current no   Tanning salons past no   Radiation treatments No     SKIN CANCER    Personal history of skin cancer: none    HAND     Previous hand injury (personal)    No      HPI:       75-year-old female right-hand-dominant with a left wrist ganglion    2 months duration, no increase in size    Moderate pain in the hand unrelieved with splinting    Concern for mass, as she has lymphoma. CT reveals a ganglion    Ganglion itself is not painful    She is noted some tingling on the dorsal ulnar hand    She has hand pain and it \"wears out\"      Review of Systems:   Constitutional: No change in appetite, chill/rigors, or fatigue  GI: No jaundice  Endocrine: No generalized weakness  Neurological: No aphasia, loss of consciousness, or seizures    Musculoskeletal:     Duration  2 months    Location:       Left ulnar wrist        Size: unchanged    Pain:  yes Rates 4/10 sharp, intermittent. Pain worsens throughout day and waking at night. Taking Tylenol, somewhat helpful. Functional problems:  yes  Pt reports decreased  strength, weakness, and hand hurts with use.       PMH:     MEDICAL  Past Medical History:   Diagnosis Date    Abnormal mammogram of left breast 08/10/2015    Anaplastic large cell lymphoma, unspecified site, extranodal and solid organ sites 05/29/2014    DVT (deep venous thrombosis) ([facility] [address] 2022    High cholesterol     History of blood transfusion     Hypothyroidism     Hypothyroidism, primary    Other malignant lymphomas of intra-abdominal lymph nodes 05/15/2014    Port catheter in place 06/14/2014 Rheumatoid arthritis ([facility] [address]     Sleep apnea     Swelling of limb 05/20/2014        SURGICAL  Past Surgical History:   Procedure Laterality Date    BONE MARROW ASPIRATE &BIOPSY  05/15/2014    BONE MARROW/BLOOD-DERIVED PERIPHERAL STEM CELL TRANSPLANTAT; ALLOGENEIC DONOR LYMPHOCYTE INFUSIONS  2014    stem cell transplant    COLONOSCOPY N/A 2/18/2019    Procedure: COLONOSCOPY;  Surgeon: Daphne Johnson MD;  Location: [facility] ENDOSCOPY    HERNIA SURGERY  37/4392    Umbilical hernia repair    FUAD BIOPSY STEREO NODULE 1 SITE LEFT (CPT=19081)      2015    FUAD BIOPSY STEREO NODULE 2 SITE BILAT (CPT=19081/36091)  08/19/2015    OTHER SURGICAL HISTORY      Injection Tendon Sheath, Ligament    TUBAL LIGATION  2001        ALLERIGIES    Aspirin                 OTHER (SEE COMMENTS)    Comment:Other reaction(s): GI problems     MEDICATIONS  Current Outpatient Medications   Medication Sig Dispense Refill    ROSUVASTATIN 40 MG Oral Tab TAKE 1 TABLET BY MOUTH EVERY EVENING 90 tablet 1    rivaroxaban (XARELTO) 10 MG Oral Tab Take 1 tablet (10 mg total) by mouth daily with food. 90 tablet 3    levothyroxine 125 MCG Oral Tab Take 1 tablet (125 mcg total) by mouth before breakfast. 90 tablet 1    Cholecalciferol (VITAMIN D) 1000 units Oral Tab Take 1,000 Units by mouth daily.             SOCIAL HISTORY  Social History     Socioeconomic History    Marital status:    Tobacco Use    Smoking status: Never    Smokeless tobacco: Never   Vaping Use    Vaping Use: Never used   Substance and Sexual Activity    Alcohol use: Yes     Comment: Occ, once a month    Drug use: No    Sexual activity: Yes     Partners: Male     Birth control/protection: Tubal Ligation   Other Topics Concern    Caffeine Concern Yes     Comment: Soda, Coffee - 2 cups daily        FAMILY HISTORY  Family History   Problem Relation Age of Onset    Diabetes Father     Heart Disease Father     Other (Other) Father     Thyroid disease Sister     Other (lupus) Sister Other (sjogren's) Sister     Heart Disease Other         Grandparents    Other (Other) Mother         Rheumatoid Arthritis    Breast Cancer Sister 52    Other (Other) Brother         Hemochromatosis    Melanoma Maternal Grandfather     Breast Cancer Paternal Aunt 72          PHYSICAL EXAM:     CONSTITUTIONAL: Overall appearance - Normal  HEENT: Normocephalic  EYES: Conjunctiva - Right: Normal, Left: Normal; EOMI  EARS: Inspection - Right: Normal, Left: Normal  NECK/THYROID: Inspection - Normal, Palpation - Normal, Thyroid gland - Normal, No adenopathy  RESPIRATORY: Inspection - Normal, Effort - Normal  CARDIOVASCULAR: Regular rhythm, No murmurs  ABDOMEN: Inspection - Normal, No abdominal tenderness  NEURO: Memory intact  PSYCH: Oriented to person, place, time, and situation, Appropriate mood and affect      Hand Physical Exam:     Left ulnar head prominence, nontender  Just ulnar is a 3 x 2 fullness, nontender    Full range of motion    No MP lag but weak extension against resistance EDC, EIP, EDQ  No pain with extension against resistance     strength 10 versus 60    ASSESSMENT/PLAN:     GANGLION     left ulnar wrist  Hand weakness    We discussed what a ganglion/mass is, including treatment options. Questions were answered and the patient wishes to proceed with treatment. That ganglion is asymptomatic. It is not tender and is not the cause of the hand weakness. I would not recommend excision of the ganglion.     She needs a weakness evaluated by physiatry    7/13/2023  Mikki Tavarez MD      +++++++++++++++++++++++++++++++++++++++++      MEDICAL DECISION MAKING    PROBLEMS      MODERATE    (number / complexity)          Undiagnosed new problem with uncertain prognosis    DATA         STRAIGHTFORWARD    (amount / complexity)           MANAGEMENT RISK  MODERATE    (complications/ morbidity)       Decision regarding major surgery                  MDM LEVEL    MODERATE

## 2023-07-26 ENCOUNTER — OFFICE VISIT (OUTPATIENT)
Dept: PHYSICAL MEDICINE AND REHAB | Facility: CLINIC | Age: 53
End: 2023-07-26
Payer: COMMERCIAL

## 2023-07-26 VITALS — BODY MASS INDEX: 40.33 KG/M2 | WEIGHT: 245 LBS | HEIGHT: 65.5 IN | HEART RATE: 78 BPM | OXYGEN SATURATION: 97 %

## 2023-07-26 DIAGNOSIS — M54.12 CERVICAL MYELOPATHY WITH CERVICAL RADICULOPATHY: Primary | ICD-10-CM

## 2023-07-26 DIAGNOSIS — R29.898 LEFT HAND WEAKNESS: ICD-10-CM

## 2023-07-26 DIAGNOSIS — G95.9 CERVICAL MYELOPATHY WITH CERVICAL RADICULOPATHY: Primary | ICD-10-CM

## 2023-07-26 PROCEDURE — 99214 OFFICE O/P EST MOD 30 MIN: CPT | Performed by: PHYSICAL MEDICINE & REHABILITATION

## 2023-07-26 PROCEDURE — 3008F BODY MASS INDEX DOCD: CPT | Performed by: PHYSICAL MEDICINE & REHABILITATION

## 2023-08-26 ENCOUNTER — HOSPITAL ENCOUNTER (OUTPATIENT)
Dept: MRI IMAGING | Facility: HOSPITAL | Age: 53
Discharge: HOME | End: 2023-08-26
Attending: PHYSICAL MEDICINE & REHABILITATION
Payer: COMMERCIAL

## 2023-08-26 DIAGNOSIS — M54.12 CERVICAL MYELOPATHY WITH CERVICAL RADICULOPATHY: ICD-10-CM

## 2023-08-26 DIAGNOSIS — G95.9 CERVICAL MYELOPATHY WITH CERVICAL RADICULOPATHY: ICD-10-CM

## 2023-08-26 PROCEDURE — 72141 MRI NECK SPINE W/O DYE: CPT | Performed by: PHYSICAL MEDICINE & REHABILITATION

## 2023-08-31 ENCOUNTER — PROCEDURE VISIT (OUTPATIENT)
Dept: PHYSICAL MEDICINE AND REHAB | Facility: CLINIC | Age: 53
End: 2023-08-31
Payer: COMMERCIAL

## 2023-08-31 DIAGNOSIS — M54.12 CERVICAL MYELOPATHY WITH CERVICAL RADICULOPATHY: Primary | ICD-10-CM

## 2023-08-31 DIAGNOSIS — G95.9 CERVICAL MYELOPATHY WITH CERVICAL RADICULOPATHY: Primary | ICD-10-CM

## 2023-09-20 ENCOUNTER — OFFICE VISIT (OUTPATIENT)
Dept: PHYSICAL MEDICINE AND REHAB | Facility: CLINIC | Age: 53
End: 2023-09-20
Payer: COMMERCIAL

## 2023-09-20 VITALS
HEIGHT: 65.5 IN | BODY MASS INDEX: 40.33 KG/M2 | OXYGEN SATURATION: 97 % | WEIGHT: 245 LBS | RESPIRATION RATE: 18 BRPM | HEART RATE: 77 BPM

## 2023-09-20 DIAGNOSIS — G56.22 CUBITAL TUNNEL SYNDROME ON LEFT: Primary | ICD-10-CM

## 2023-09-20 PROCEDURE — 3008F BODY MASS INDEX DOCD: CPT | Performed by: PHYSICAL MEDICINE & REHABILITATION

## 2023-09-20 PROCEDURE — 99214 OFFICE O/P EST MOD 30 MIN: CPT | Performed by: PHYSICAL MEDICINE & REHABILITATION

## 2023-09-27 ENCOUNTER — TELEPHONE (OUTPATIENT)
Dept: ORTHOPEDICS CLINIC | Facility: CLINIC | Age: 53
End: 2023-09-27

## 2023-09-27 NOTE — TELEPHONE ENCOUNTER
Patient called for left arm pain she stated its from wrist to elbow, prior imaging in epic, please advise for further imaging  Future Appointments   Date Time Provider Michele Borja   10/12/2023  4:00 PM Azucena Long MD [address] [place] [address] HEM ONC EMO   11/1/2023  3:45 PM Eric Escalona MD [facility]

## 2023-09-28 NOTE — TELEPHONE ENCOUNTER
Do you want further imaging? EMG done on 08/31/23  conclusion: This is an ABNORMAL study. There is electrodiagnostic evidence of a BILATERAL demyelinating and axonal ulnar mononeuropathy at the elbow with likely cubital tunnel syndrome. There is sensory involvement in bilateral upper extremities. There is borderline prolonged latency with decreased conduction velocity on motor ulnar nerve conduction studies as well. Patient has a positive Wartenberg's sign in the left hand. There are signs of active denervation in the left FCU muscle with signs of chronic reinnervation and signs of chronic reinnervation in the left FDI muscle. Recommend cubital tunnel brace at nighttime in the left hand and orthopedic evaluation for consideration of surgical intervention. There is electrodiagnostic evidence of a BILATERAL median sensory demyelinating more than axonal mononeuropathy at the wrist consistent with carpal tunnel syndrome. There are signs of active denervation in the left APB muscle. This moderate in nature in the left hand advised patient to start wearing the night splint for the next 4 to 6 weeks. Recommend orthopedic evaluation as well. There is no evidence of a BILATERAL brachial plexopathy, myopathy, peripheral neuropathy or radiculopathy in this study. CT Left wrist 5/18/23  CONCLUSION: Possible ganglion along the dorsum of the wrist adjacent to the ulna. This is suboptimally assessed on this CT. Consider further assessment with MRI as clinically warranted. The outside ultrasound images demonstrating a ganglion are not   available for comparison. When and if these images become available for comparison, an addendum to this report can be issued to correlate today's CT finding to the prior ultrasound finding.      X-Ray 5/6/23  CONCLUSION:   No radiographically visible acute osseous injury of the left wrist.

## 2023-10-02 ENCOUNTER — IMMUNIZATION (OUTPATIENT)
Dept: LAB | Age: 53
End: 2023-10-02
Attending: EMERGENCY MEDICINE
Payer: COMMERCIAL

## 2023-10-02 DIAGNOSIS — Z23 NEED FOR VACCINATION: Primary | ICD-10-CM

## 2023-10-02 PROCEDURE — 90480 ADMN SARSCOV2 VAC 1/ONLY CMP: CPT

## 2023-10-09 ENCOUNTER — LAB ENCOUNTER (OUTPATIENT)
Dept: LAB | Age: 53
End: 2023-10-09
Attending: INTERNAL MEDICINE
Payer: COMMERCIAL

## 2023-10-09 DIAGNOSIS — Z86.718 HISTORY OF DVT (DEEP VEIN THROMBOSIS): ICD-10-CM

## 2023-10-09 DIAGNOSIS — Z08 ENCOUNTER FOR FOLLOW-UP SURVEILLANCE OF LYMPHOMA: Primary | ICD-10-CM

## 2023-10-09 DIAGNOSIS — Z79.01 CURRENT USE OF ANTICOAGULANT THERAPY: ICD-10-CM

## 2023-10-09 DIAGNOSIS — Z85.72 ENCOUNTER FOR FOLLOW-UP SURVEILLANCE OF LYMPHOMA: Primary | ICD-10-CM

## 2023-10-09 DIAGNOSIS — Z08 ENCOUNTER FOR FOLLOW-UP SURVEILLANCE OF LYMPHOMA: ICD-10-CM

## 2023-10-09 DIAGNOSIS — Z85.72 ENCOUNTER FOR FOLLOW-UP SURVEILLANCE OF LYMPHOMA: ICD-10-CM

## 2023-10-09 LAB
ALBUMIN SERPL-MCNC: 3.5 G/DL (ref 3.4–5)
ALBUMIN/GLOB SERPL: 1 {RATIO} (ref 1–2)
ALP LIVER SERPL-CCNC: 88 U/L
ALT SERPL-CCNC: 36 U/L
ANION GAP SERPL CALC-SCNC: 6 MMOL/L (ref 0–18)
AST SERPL-CCNC: 23 U/L (ref 15–37)
BASOPHILS # BLD AUTO: 0.07 X10(3) UL (ref 0–0.2)
BASOPHILS NFR BLD AUTO: 1.1 %
BILIRUB SERPL-MCNC: 0.4 MG/DL (ref 0.1–2)
BUN BLD-MCNC: 11 MG/DL (ref 7–18)
BUN/CREAT SERPL: 14.9 (ref 10–20)
CALCIUM BLD-MCNC: 8.8 MG/DL (ref 8.5–10.1)
CHLORIDE SERPL-SCNC: 109 MMOL/L (ref 98–112)
CO2 SERPL-SCNC: 29 MMOL/L (ref 21–32)
CREAT BLD-MCNC: 0.74 MG/DL
DEPRECATED RDW RBC AUTO: 45.3 FL (ref 35.1–46.3)
EGFRCR SERPLBLD CKD-EPI 2021: 95 ML/MIN/1.73M2 (ref 60–?)
EOSINOPHIL # BLD AUTO: 0.13 X10(3) UL (ref 0–0.7)
EOSINOPHIL NFR BLD AUTO: 2.1 %
ERYTHROCYTE [DISTWIDTH] IN BLOOD BY AUTOMATED COUNT: 13.9 % (ref 11–15)
FASTING STATUS PATIENT QL REPORTED: YES
GLOBULIN PLAS-MCNC: 3.6 G/DL (ref 2.8–4.4)
GLUCOSE BLD-MCNC: 114 MG/DL (ref 70–99)
HCT VFR BLD AUTO: 45.3 %
HGB BLD-MCNC: 15 G/DL
IMM GRANULOCYTES # BLD AUTO: 0.02 X10(3) UL (ref 0–1)
IMM GRANULOCYTES NFR BLD: 0.3 %
LDH SERPL L TO P-CCNC: 164 U/L
LYMPHOCYTES # BLD AUTO: 2.34 X10(3) UL (ref 1–4)
LYMPHOCYTES NFR BLD AUTO: 37.4 %
MCH RBC QN AUTO: 29.4 PG (ref 26–34)
MCHC RBC AUTO-ENTMCNC: 33.1 G/DL (ref 31–37)
MCV RBC AUTO: 88.6 FL
MONOCYTES # BLD AUTO: 0.47 X10(3) UL (ref 0.1–1)
MONOCYTES NFR BLD AUTO: 7.5 %
NEUTROPHILS # BLD AUTO: 3.22 X10 (3) UL (ref 1.5–7.7)
NEUTROPHILS # BLD AUTO: 3.22 X10(3) UL (ref 1.5–7.7)
NEUTROPHILS NFR BLD AUTO: 51.6 %
OSMOLALITY SERPL CALC.SUM OF ELEC: 298 MOSM/KG (ref 275–295)
PLATELET # BLD AUTO: 207 10(3)UL (ref 150–450)
POTASSIUM SERPL-SCNC: 4.3 MMOL/L (ref 3.5–5.1)
PROT SERPL-MCNC: 7.1 G/DL (ref 6.4–8.2)
RBC # BLD AUTO: 5.11 X10(6)UL
SODIUM SERPL-SCNC: 144 MMOL/L (ref 136–145)
WBC # BLD AUTO: 6.3 X10(3) UL (ref 4–11)

## 2023-10-09 PROCEDURE — 36415 COLL VENOUS BLD VENIPUNCTURE: CPT

## 2023-10-09 PROCEDURE — 80053 COMPREHEN METABOLIC PANEL: CPT

## 2023-10-09 PROCEDURE — 85025 COMPLETE CBC W/AUTO DIFF WBC: CPT

## 2023-10-09 PROCEDURE — 83615 LACTATE (LD) (LDH) ENZYME: CPT

## 2023-10-12 ENCOUNTER — OFFICE VISIT (OUTPATIENT)
Dept: HEMATOLOGY/ONCOLOGY | Facility: HOSPITAL | Age: 53
End: 2023-10-12
Attending: INTERNAL MEDICINE
Payer: COMMERCIAL

## 2023-10-12 ENCOUNTER — TELEPHONE (OUTPATIENT)
Dept: HEMATOLOGY/ONCOLOGY | Facility: HOSPITAL | Age: 53
End: 2023-10-12

## 2023-10-12 VITALS
BODY MASS INDEX: 40.02 KG/M2 | SYSTOLIC BLOOD PRESSURE: 126 MMHG | HEIGHT: 66 IN | TEMPERATURE: 98 F | OXYGEN SATURATION: 98 % | HEART RATE: 83 BPM | RESPIRATION RATE: 18 BRPM | WEIGHT: 249 LBS | DIASTOLIC BLOOD PRESSURE: 75 MMHG

## 2023-10-12 DIAGNOSIS — Z08 ENCOUNTER FOR FOLLOW-UP SURVEILLANCE OF LYMPHOMA: Primary | ICD-10-CM

## 2023-10-12 DIAGNOSIS — Z79.01 CURRENT USE OF ANTICOAGULANT THERAPY: ICD-10-CM

## 2023-10-12 DIAGNOSIS — Z85.72 ENCOUNTER FOR FOLLOW-UP SURVEILLANCE OF LYMPHOMA: Primary | ICD-10-CM

## 2023-10-12 DIAGNOSIS — Z86.718 HISTORY OF DVT (DEEP VEIN THROMBOSIS): ICD-10-CM

## 2023-10-12 PROCEDURE — 99214 OFFICE O/P EST MOD 30 MIN: CPT | Performed by: INTERNAL MEDICINE

## 2023-10-12 NOTE — TELEPHONE ENCOUNTER
Called and unable to reach patient. Left VM letting her know we had some cancellations and have open spots for earlier times. Told if she would like to come earlier than 4:00 pm, to call our office back. Provided office phone number.

## 2023-11-01 ENCOUNTER — OFFICE VISIT (OUTPATIENT)
Dept: ORTHOPEDICS CLINIC | Facility: CLINIC | Age: 53
End: 2023-11-01
Payer: COMMERCIAL

## 2023-11-01 ENCOUNTER — PATIENT MESSAGE (OUTPATIENT)
Dept: ORTHOPEDICS CLINIC | Facility: CLINIC | Age: 53
End: 2023-11-01

## 2023-11-01 VITALS — WEIGHT: 249 LBS | HEIGHT: 66 IN | BODY MASS INDEX: 40.02 KG/M2

## 2023-11-01 DIAGNOSIS — M71.332 SYNOVIAL CYST OF LEFT WRIST: Primary | ICD-10-CM

## 2023-11-01 PROCEDURE — 3008F BODY MASS INDEX DOCD: CPT | Performed by: ORTHOPAEDIC SURGERY

## 2023-11-01 PROCEDURE — 99205 OFFICE O/P NEW HI 60 MIN: CPT | Performed by: ORTHOPAEDIC SURGERY

## 2023-11-02 NOTE — TELEPHONE ENCOUNTER
From: Zaki Fragoso  To: Janelle Aparicio  Sent: 11/1/2023 4:41 PM CDT  Subject: Appointment change     I need to reschedule my appointment 11/15   The earliest I could schedule an MRI is 11/24  I will need to move my appointment to after that date  I tried to change it online but the only dates available are in January. I was hoping for an early date?   Thank you for your help with this matter

## 2023-11-06 NOTE — TELEPHONE ENCOUNTER
Future Appointments   Date Time Provider Michele Borja   11/29/2023  3:30 PM Tisha Eastman MD [facility] [place]       Patient already scheduled her own appt via [address] [place] [address] [address].

## 2023-11-15 DIAGNOSIS — Z08 ENCOUNTER FOR FOLLOW-UP SURVEILLANCE OF LYMPHOMA: Primary | ICD-10-CM

## 2023-11-15 DIAGNOSIS — Z85.72 ENCOUNTER FOR FOLLOW-UP SURVEILLANCE OF LYMPHOMA: Primary | ICD-10-CM

## 2023-11-24 ENCOUNTER — HOSPITAL ENCOUNTER (OUTPATIENT)
Dept: MRI IMAGING | Facility: HOSPITAL | Age: 53
Discharge: HOME | End: 2023-11-24
Attending: ORTHOPAEDIC SURGERY
Payer: COMMERCIAL

## 2023-11-24 DIAGNOSIS — M71.332 SYNOVIAL CYST OF LEFT WRIST: ICD-10-CM

## 2023-11-24 PROCEDURE — 73221 MRI JOINT UPR EXTREM W/O DYE: CPT | Performed by: ORTHOPAEDIC SURGERY

## 2023-11-29 ENCOUNTER — OFFICE VISIT (OUTPATIENT)
Dept: ORTHOPEDICS CLINIC | Facility: CLINIC | Age: 53
End: 2023-11-29
Payer: COMMERCIAL

## 2023-11-29 VITALS — BODY MASS INDEX: 40.02 KG/M2 | HEIGHT: 66 IN | WEIGHT: 249 LBS

## 2023-11-29 DIAGNOSIS — G56.02 CARPAL TUNNEL SYNDROME OF LEFT WRIST: Primary | ICD-10-CM

## 2023-11-29 DIAGNOSIS — G56.22 CUBITAL TUNNEL SYNDROME ON LEFT: ICD-10-CM

## 2023-11-29 DIAGNOSIS — G56.22 GUYON SYNDROME, LEFT: ICD-10-CM

## 2023-11-29 PROCEDURE — 3008F BODY MASS INDEX DOCD: CPT | Performed by: ORTHOPAEDIC SURGERY

## 2023-11-29 PROCEDURE — 99215 OFFICE O/P EST HI 40 MIN: CPT | Performed by: ORTHOPAEDIC SURGERY

## 2023-12-08 RX ORDER — ROSUVASTATIN CALCIUM 40 MG/1
TABLET, COATED ORAL
Qty: 90 TABLET | Refills: 3 | Status: SHIPPED | OUTPATIENT
Start: 2023-12-08

## 2023-12-08 NOTE — TELEPHONE ENCOUNTER
Refill passed per [place] [facility] protocol.   Requested Prescriptions   Pending Prescriptions Disp Refills    rosuvastatin 40 MG Oral Tab 90 tablet 1     Sig: TAKE 1 TABLET BY MOUTH EVERY EVENING       Cholesterol Medication Protocol Passed - 12/7/2023 12:50 PM        Passed - ALT in past 12 months        Passed - LDL in past 12 months        Passed - Last ALT < 80     Lab Results   Component Value Date    ALT 36 10/09/2023             Passed - Last LDL < 130     Lab Results   Component Value Date    LDL 58 04/01/2023             Passed - In person appointment or virtual visit in the past 12 mos or appointment in next 3 mos     Recent Outpatient Visits              1 week ago     [address] [facility], Ernestina Nails MD    Office Visit    1 month ago Synovial cyst of left wrist    Lola Viola, [facility], Ernestina Nails MD    Office Visit    1 month ago Encounter for follow-up surveillance of [address] [place] [address] Hematology Oncology Edu Soliman MD    Office Visit    2 months ago Cubital tunnel syndrome on left    [facility], [place] Londell Jean, DO    Office Visit    4 months ago Cervical myelopathy with cervical radiculopathy     [facility], Viraj Maki, DO    Office Visit          Future Appointments         Provider Department Appt Notes    In 10 months Darwin Madrid 19 Hematology Oncology 1 year f/u.md                  Recent Outpatient Visits              1 week ago     [address] [facility], Ernestina Nails MD    Office Visit    1 month ago Synovial cyst of left wrist    Lola Rod, [facility], Ernestina Nails MD    Office Visit    1 month ago Encounter for follow-up surveillance of [address] [place] [address] Hematology Oncology Edu Soliman MD    Office Visit    2 months ago Cubital tunnel syndrome on left    [facility], [address], [place] Du Gaitan,     Office Visit    4 months ago Cervical myelopathy with cervical radiculopathy     [facility], [address], [place] Jan Muñoz DO    Office Visit          Future Appointments         Provider Department Appt Notes    In 10 months Evelyn Barakat, [address] [place] [address] Hematology Oncology 1 year f/u.md no apparent

## 2023-12-27 ENCOUNTER — TELEPHONE (OUTPATIENT)
Dept: ORTHOPEDICS CLINIC | Facility: CLINIC | Age: 53
End: 2023-12-27

## 2023-12-27 ENCOUNTER — HOSPITAL ENCOUNTER (OUTPATIENT)
Facility: HOSPITAL | Age: 53
Setting detail: HOSPITAL OUTPATIENT SURGERY
End: 2023-12-27
Attending: ORTHOPAEDIC SURGERY | Admitting: ORTHOPAEDIC SURGERY

## 2023-12-27 DIAGNOSIS — G56.02 CARPAL TUNNEL SYNDROME OF LEFT WRIST: Primary | ICD-10-CM

## 2023-12-27 DIAGNOSIS — G56.22 CUBITAL TUNNEL SYNDROME ON LEFT: ICD-10-CM

## 2023-12-27 DIAGNOSIS — G56.22 GUYON SYNDROME, LEFT: ICD-10-CM

## 2023-12-27 NOTE — TELEPHONE ENCOUNTER
Date of Surgery: 3/22/24       Post Op Appt: 3/25/24 @ 0920, 24 @ 0300    Case ID: [id number]    Notes:       [id number]   Name: Zaki Fragoso  MRN: [id number]   : 3/22/1970     Surgical Date:    3/22/24   Surgical Consent:    Left carpal tunnel release, guyon's canal release, and ulnar nerve transposition   Diagnosis:         ICD-10-CM   1. Carpal tunnel syndrome of left wrist  G56.02   2. Cubital tunnel syndrome on left  G56.22   3.  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       Procedure Codes:    Open carpal tunnel release (CPT 57163)  Guyon's canal release (CPT 17695)  Cubital tunnel release (CPT 87185  Flexor pronator tendon lengthening (CPT 43330)   Disposition:    Outpatient   Operative Time:    2 hrs   Antibiotics:    Ancef 2g   Anesthesia Type:    Monitored Anesthesia Care (MAC) and Regional - Supraclavicular   Clearance:     NONE   Equipment:    Submuscular Ulnar Nerve Transposition Sterile Tourniquet, 10 Danish Drain, 1% lidocaine with epinephrine, Vessel loops x2, Suture: 0 Vicryl UR-6, 3-0 Vicryl, 3-0 Monocryl, 4-0 Monocryl, 3-0 nylon, Skin glue & Steri-Strips , and Sling   Assistant:    Gladys Allen Assistant: Yes, Bri Tipton PA-C   Follow Up:    2 days post op, 12-14 days post op with Bri Tipton   Pain Medication:    Norco 325-5mg   Therapy:    None

## 2023-12-27 NOTE — PROGRESS NOTES
SURGICAL BOOKING SHEET   Name: Jennifer Cedeno  MRN: [id number]   : 3/22/1970    Surgical Date:   24   Surgical Consent:   Right carpal tunnel release and ulnar nerve transposition   Diagnosis:      ICD-10-CM   1. Carpal tunnel syndrome of left wrist  G56.01   2.  Cubital tunnel syndrome on left  G56.21            Procedure Codes:   Open carpal tunnel release (CPT 94174)  Cubital tunnel release (CPT 72591  Flexor pronator tendon lengthening (CPT 28234)   Disposition:   Outpatient   Operative Time:   1.5 hrs   Antibiotics:   Ancef 2g   Anesthesia Type:   Monitored Anesthesia Care (MAC) and Regional   Clearance:    NONE   Equipment:   Submuscular Ulnar Nerve Transposition Sterile Tourniquet, 10 Turkish Drain, 1% lidocaine with epinephrine, Vessel loops x2, Suture: 0 Vicryl UR-6, 3-0 Vicryl, 3-0 Monocryl, 4-0 Monocryl, 3-0 nylon, Skin glue & Steri-Strips , and Sling   Assistant:   Oralia Bacon Assistant: Yes, Denisha Ortega PA-C   Follow Up:   2 days post op, 12-14 days post op with Denisha Ortega   Pain Medication:   Norco 325-5mg   Therapy:   None

## 2023-12-27 NOTE — PROGRESS NOTES
SURGICAL BOOKING SHEET   Name: Yana Lazo  MRN: [id number]   : 3/22/1970    Surgical Date:   3/22/24   Surgical Consent:   Left carpal tunnel release, guyon's canal release, and ulnar nerve transposition   Diagnosis:      ICD-10-CM   1. Carpal tunnel syndrome of left wrist  G56.02   2. Cubital tunnel syndrome on left  G56.22   3.  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       Procedure Codes:   Open carpal tunnel release (CPT 85888)  Guyon's canal release (CPT 48662)  Cubital tunnel release (CPT 56075  Flexor pronator tendon lengthening (CPT 35111)   Disposition:   Outpatient   Operative Time:   2 hrs   Antibiotics:   Ancef 2g   Anesthesia Type:   Monitored Anesthesia Care (MAC) and Regional - Supraclavicular   Clearance:    NONE   Equipment:   Submuscular Ulnar Nerve Transposition Sterile Tourniquet, 10 Danish Drain, 1% lidocaine with epinephrine, Vessel loops x2, Suture: 0 Vicryl UR-6, 3-0 Vicryl, 3-0 Monocryl, 4-0 Monocryl, 3-0 nylon, Skin glue & Steri-Strips , and Sling   Assistant:   Emeterio Rivas Assistant: Yes, Juan A Edwards PA-C   Follow Up:   2 days post op, 12-14 days post op with Juan A Edwards   Pain Medication:   Norco 325-5mg   Therapy:   None

## 2023-12-27 NOTE — TELEPHONE ENCOUNTER
Date of Surgery: 24       Post Op Appt:      Case ID: [id number]    Notes:         SURGICAL BOOKING SHEET   Name: Daisy Hamm  MRN: [id number]   : 3/22/1970     Surgical Date:    24   Surgical Consent:    Right carpal tunnel release and ulnar nerve transposition   Diagnosis:         ICD-10-CM   1. Carpal tunnel syndrome of left wrist  G56.01   2. Cubital tunnel syndrome on left  G56.21               Procedure Codes:    Open carpal tunnel release (CPT 49262)  Cubital tunnel release (CPT 23736  Flexor pronator tendon lengthening (CPT 65031)   Disposition:    Outpatient   Operative Time:    1.5 hrs   Antibiotics:    Ancef 2g   Anesthesia Type:    Monitored Anesthesia Care (MAC) and Regional   Clearance:     NONE   Equipment:    Submuscular Ulnar Nerve Transposition Sterile Tourniquet, 10 Kyrgyz Drain, 1% lidocaine with epinephrine, Vessel loops x2, Suture: 0 Vicryl UR-6, 3-0 Vicryl, 3-0 Monocryl, 4-0 Monocryl, 3-0 nylon, Skin glue & Steri-Strips , and Sling   Assistant:    Arnie Assistant: Yes, Mae Cunha PA-C   Follow Up:    2 days post op, 12-14 days post op with Mae Cunha   Pain Medication:    Norco 325-5mg   Therapy:    None

## 2023-12-29 NOTE — TELEPHONE ENCOUNTER
Called and spoke with Sanda Cranker in regards to her upcoming surgery. I did confirm with her that she will be having surgery at [address] [place] [address]. I did discuss the surgical protocol and post op appt with her. She states understanding with no questions or concerns.

## 2024-02-22 ENCOUNTER — OFFICE VISIT (OUTPATIENT)
Dept: FAMILY MEDICINE CLINIC | Facility: CLINIC | Age: 54
End: 2024-02-22

## 2024-02-22 ENCOUNTER — NURSE TRIAGE (OUTPATIENT)
Dept: FAMILY MEDICINE CLINIC | Facility: CLINIC | Age: 54
End: 2024-02-22

## 2024-02-22 ENCOUNTER — TELEPHONE (OUTPATIENT)
Dept: FAMILY MEDICINE CLINIC | Facility: CLINIC | Age: 54
End: 2024-02-22

## 2024-02-22 VITALS
HEART RATE: 86 BPM | WEIGHT: 250 LBS | BODY MASS INDEX: 40.18 KG/M2 | HEIGHT: 66 IN | SYSTOLIC BLOOD PRESSURE: 130 MMHG | DIASTOLIC BLOOD PRESSURE: 81 MMHG

## 2024-02-22 DIAGNOSIS — R35.0 URINARY FREQUENCY: Primary | ICD-10-CM

## 2024-02-22 LAB
APPEARANCE: CLEAR
BILIRUBIN: NEGATIVE
GLUCOSE (URINE DIPSTICK): NEGATIVE MG/DL
KETONES (URINE DIPSTICK): NEGATIVE MG/DL
MULTISTIX LOT#: ABNORMAL NUMERIC
NITRITE, URINE: NEGATIVE
PH, URINE: 5.5 (ref 4.5–8)
PROTEIN (URINE DIPSTICK): 30 MG/DL
SPECIFIC GRAVITY: 1.02 (ref 1–1.03)
URINE-COLOR: YELLOW
UROBILINOGEN,SEMI-QN: 0.2 MG/DL (ref 0–1.9)

## 2024-02-22 PROCEDURE — 3079F DIAST BP 80-89 MM HG: CPT | Performed by: FAMILY MEDICINE

## 2024-02-22 PROCEDURE — 3075F SYST BP GE 130 - 139MM HG: CPT | Performed by: FAMILY MEDICINE

## 2024-02-22 PROCEDURE — 3008F BODY MASS INDEX DOCD: CPT | Performed by: FAMILY MEDICINE

## 2024-02-22 PROCEDURE — 99213 OFFICE O/P EST LOW 20 MIN: CPT | Performed by: FAMILY MEDICINE

## 2024-02-22 PROCEDURE — 81003 URINALYSIS AUTO W/O SCOPE: CPT | Performed by: FAMILY MEDICINE

## 2024-02-22 RX ORDER — CIPROFLOXACIN 500 MG/1
500 TABLET, FILM COATED ORAL 2 TIMES DAILY
Qty: 10 TABLET | Refills: 0 | Status: SHIPPED | OUTPATIENT
Start: 2024-02-22 | End: 2024-02-27

## 2024-02-22 NOTE — TELEPHONE ENCOUNTER
----- Message from Cordelia Montiel RN sent at 2/22/2024  3:33 PM CST -----  Regarding: FW: Infection   Contact: [phone]      ----- Message -----  From: Daisy Hamm  Sent: 2/22/2024   7:45 AM CST  To: Em Triage Support  Subject: Infection                                        Good morning     Over the past two days I have had a little pain while urinating.  Last night there was a small trace of blood in my urine.  This morning there is increased blood.   I am not sure if it is because I am on a blood thinner.  Just wondering wit there is something I could take to help.  I have taken Tylenol

## 2024-02-22 NOTE — TELEPHONE ENCOUNTER
Action Requested: Summary for Provider     []  Critical Lab, Recommendations Needed  [] Need Additional Advice  []   FYI    []   Need Orders  [] Need Medications Sent to Pharmacy  []  Other     SUMMARY: Per protocol, patient should be seen in the office today. 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 Appointment scheduled.    Future Appointments   Date Time Provider Department Center   2/22/2024  4:40 PM Jama Jackson,  [facility] [place]   3/25/2024  9:20 AM Mae King PA [facility] [id number]   4/8/2024  8:00 AM Willy Gaitan MD [facility] [id number]   10/11/2024  4:00 PM Ritchie Tolbert MD [facility]     Reason for call: Urinary Frequency  Onset: 2/21    Patient reports of frequency (2-3 times overnight), urgency, and burning sensation with urination. States she already noticed pain with urination a few days ago. Also noted small amount of blood in the urine last night and this morning. She is on Xarelto for 2 years now. Denies back or flank pain. Care advice given per protocol - push fluids. Patient verbalized understanding and agreed with plan of care.     Reason for Disposition   Urinating more frequently than usual (i.e., frequency)    Protocols used: Urinary Symptoms-A-OH

## 2024-02-22 NOTE — PROGRESS NOTES
Blood pressure 130/81, pulse 86, height 5' 6\" (1.676 m), weight 250 lb (113.4 kg), not currently breastfeeding.          Complaining of blood in urine since yesterday.  Incomplete emptying also.  No fever no back pain no vomiting.    Objective urine dipstick showing blood and leukocytes    Back without CVA tenderness patient comfortable no apparent distress    Assessment hematuria patient does take a DOAC    Plan urine sent for culture also Cipro that was esterase in urine patient is symptomatic we will follow with results

## 2024-02-23 LAB
BILIRUB UR QL: NEGATIVE
GLUCOSE UR-MCNC: NORMAL MG/DL
KETONES UR-MCNC: NEGATIVE MG/DL
LEUKOCYTE ESTERASE UR QL STRIP.AUTO: 500
NITRITE UR QL STRIP.AUTO: NEGATIVE
PH UR: 5 [PH] (ref 5–8)
PROT UR-MCNC: 20 MG/DL
RBC #/AREA URNS AUTO: >10 /HPF
SP GR UR STRIP: 1.02 (ref 1–1.03)
UROBILINOGEN UR STRIP-ACNC: NORMAL
WBC #/AREA URNS AUTO: >50 /HPF
YEAST UR QL: PRESENT /HPF

## 2024-02-26 ENCOUNTER — TELEPHONE (OUTPATIENT)
Dept: FAMILY MEDICINE CLINIC | Facility: CLINIC | Age: 54
End: 2024-02-26

## 2024-02-26 NOTE — TELEPHONE ENCOUNTER
Patient scheduled a mychart appointment noting the following on appointment notes:    Feeling shaky at times     2/26/24 TE and mychart message sent.    Please advise

## 2024-02-27 ENCOUNTER — OFFICE VISIT (OUTPATIENT)
Dept: FAMILY MEDICINE CLINIC | Facility: CLINIC | Age: 54
End: 2024-02-27
Payer: COMMERCIAL

## 2024-02-27 VITALS
WEIGHT: 247 LBS | DIASTOLIC BLOOD PRESSURE: 75 MMHG | BODY MASS INDEX: 39.7 KG/M2 | HEART RATE: 84 BPM | HEIGHT: 66 IN | SYSTOLIC BLOOD PRESSURE: 112 MMHG

## 2024-02-27 DIAGNOSIS — R73.03 PREDIABETES: Primary | ICD-10-CM

## 2024-02-27 DIAGNOSIS — E03.9 ACQUIRED HYPOTHYROIDISM: ICD-10-CM

## 2024-02-27 DIAGNOSIS — R31.9 HEMATURIA, UNSPECIFIED TYPE: ICD-10-CM

## 2024-02-27 PROCEDURE — 3078F DIAST BP <80 MM HG: CPT | Performed by: PHYSICIAN ASSISTANT

## 2024-02-27 PROCEDURE — 3008F BODY MASS INDEX DOCD: CPT | Performed by: PHYSICIAN ASSISTANT

## 2024-02-27 PROCEDURE — 81002 URINALYSIS NONAUTO W/O SCOPE: CPT | Performed by: PHYSICIAN ASSISTANT

## 2024-02-27 PROCEDURE — 83036 HEMOGLOBIN GLYCOSYLATED A1C: CPT | Performed by: PHYSICIAN ASSISTANT

## 2024-02-27 PROCEDURE — 99213 OFFICE O/P EST LOW 20 MIN: CPT | Performed by: PHYSICIAN ASSISTANT

## 2024-02-27 PROCEDURE — 3074F SYST BP LT 130 MM HG: CPT | Performed by: PHYSICIAN ASSISTANT

## 2024-02-27 NOTE — PROGRESS NOTES
HPI:     HPI  53 year-old female is here in the office complaining of intermittent feeling shaky for the past week.  Patient denies of chest pain, SOB, N/V/C/D, fever, dizziness, syncope, abdominal pain, cough, sore throat, headache, anxiety, stress. There are no other concerns today.    Medications:     Current Outpatient Medications   Medication Sig Dispense Refill    rosuvastatin 40 MG Oral Tab TAKE 1 TABLET BY MOUTH EVERY EVENING 90 tablet 3    levothyroxine 125 MCG Oral Tab Take 1 tablet (125 mcg total) by mouth before breakfast. 90 tablet 3    rivaroxaban (XARELTO) 10 MG Oral Tab Take 1 tablet (10 mg total) by mouth daily with food. 90 tablet 3    Cholecalciferol (VITAMIN D) 1000 units Oral Tab Take 1,000 Units by mouth daily.           Allergies:     Allergies   Allergen Reactions    Aspirin OTHER (SEE COMMENTS)     Other reaction(s): GI problems       History:     Health Maintenance   Topic Date Due    Pneumococcal Vaccine: Birth to 64yrs (3 of 3 - PPSV23 or PCV20) 01/19/2022    COVID-19 Vaccine (7 - 2023-24 season) 11/27/2023    Annual Depression Screening  01/01/2024    Pap Smear  03/01/2024    Annual Physical  03/15/2024    Mammogram  04/03/2024    DTaP,Tdap,and Td Vaccines (4 - Td or Tdap) 09/30/2025    Colorectal Cancer Screening  02/18/2029    Influenza Vaccine  Completed    Zoster Vaccines  Completed       No LMP recorded (lmp unknown). (Menstrual status: Chemo).   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    Swelling of limb 05/20/2014    Visual impairment     GLASSES       Past Surgical History:     Past Surgical History:   Procedure Laterality Date    Bone marrow aspirate &biopsy  05/15/2014    Bone marrow/blood-derived peripheral stem cell transplantat; allogeneic donor lymphocyte infusions  2014    stem cell transplant    Colonoscopy N/A 2/18/2019    Procedure: COLONOSCOPY;  Surgeon: Larry Ladd MD;  Location: [facility] ENDOSCOPY    Hernia surgery  11/1999    Umbilical hernia repair    Ofe biopsy stereo nodule 1 site left (cpt=19081)      2015    Ofe biopsy stereo nodule 2 site bilat (cpt=19081/93991)  08/19/2015    Other surgical history      Injection Tendon Sheath, Ligament    Tubal ligation  2001       Family History:     Family History   Problem Relation Age of Onset    Diabetes Father     Heart Disease Father     Other (Other) Father     Thyroid disease Sister     Other (lupus) Sister     Other (sjogren's) Sister     Heart Disease Other         Grandparents    Other (Other) Mother         Rheumatoid Arthritis    Breast Cancer Sister 47    Other (Other) Brother         Hemochromatosis    Melanoma Maternal Grandfather     Breast Cancer Paternal Aunt 65       Social History:     Social History     Socioeconomic History    Marital status:      Spouse name: Not on file    Number of children: Not on file    Years of education: Not on file    Highest education level: Not on file   Occupational History    Not on file   Tobacco Use    Smoking status: Never    Smokeless tobacco: Never   Vaping Use    Vaping Use: Never used   Substance and Sexual Activity    Alcohol use: Yes     Comment: Occ, once a month    Drug use: No    Sexual activity: Yes     Partners: Male     Birth control/protection: Tubal Ligation   Other Topics Concern     Service Not Asked    Blood Transfusions Not Asked    Caffeine Concern Yes     Comment:  Soda, Coffee - 2 cups daily    Occupational Exposure Not Asked    Hobby Hazards Not Asked    Sleep Concern Not Asked    Stress Concern Not Asked    Weight Concern Not Asked    Special Diet Not Asked    Back Care Not Asked    Exercise Not Asked    Bike Helmet Not Asked    Seat Belt Not Asked    Self-Exams Not Asked    Grew up on a farm Not Asked    History of tanning Not Asked    Outdoor occupation Not Asked    Pt has a pacemaker Not Asked    Pt has a defibrillator Not Asked    Breast feeding Not Asked    Reaction to local anesthetic Not Asked   Social History Narrative    Not on file     Social Determinants of Health     Financial Resource Strain: Not on file   Food Insecurity: Not on file   Transportation Needs: Not on file   Physical Activity: Not on file   Stress: Not on file   Social Connections: Not on file   Housing Stability: Not on file       Review of Systems:   Review of Systems   Constitutional:  Negative for activity change, chills, fatigue and fever.   HENT:  Negative for congestion, ear discharge, ear pain, postnasal drip, rhinorrhea, sinus pressure, sinus pain and sore throat.    Respiratory:  Negative for cough, chest tightness, shortness of breath and wheezing.    Cardiovascular:  Negative for chest pain and palpitations.   Gastrointestinal:  Negative for abdominal distention, abdominal pain, blood in stool, constipation, diarrhea, nausea and vomiting.   Skin:  Negative for rash.        Vitals:    02/27/24 1611   BP: 112/75   Pulse: 84   Weight: 247 lb (112 kg)   Height: 5' 6\" (1.676 m)     Body mass index is 39.87 kg/m².    Physical Exam:   Physical Exam  Vitals reviewed.   Constitutional:       General: She is not in acute distress.     Appearance: She is well-developed.   HENT:      Head: Normocephalic and atraumatic.      Right Ear: Tympanic membrane, ear canal and external ear normal.      Left Ear: Tympanic membrane, ear canal and external ear normal.      Nose: Nose normal.      Mouth/Throat:       Mouth: Mucous membranes are moist.      Pharynx: Oropharynx is clear. No oropharyngeal exudate or posterior oropharyngeal erythema.   Eyes:      General:         Right eye: No discharge.         Left eye: No discharge.      Conjunctiva/sclera: Conjunctivae normal.   Cardiovascular:      Rate and Rhythm: Normal rate and regular rhythm.      Heart sounds: Normal heart sounds, S1 normal and S2 normal. No murmur heard.  Pulmonary:      Effort: Pulmonary effort is normal.      Breath sounds: Normal breath sounds. No wheezing or rales.   Chest:      Chest wall: No tenderness.   Lymphadenopathy:      Cervical: No cervical adenopathy.   Skin:     Findings: No rash.   Neurological:      Mental Status: She is alert and oriented to person, place, and time.   Psychiatric:         Behavior: Behavior is cooperative.          Assessment and Plan::     Problem List Items Addressed This Visit          Endocrine and Metabolic    Hypothyroidism    Relevant Orders    TSH W Reflex To Free T4     Other Visit Diagnoses       Prediabetes    -  Primary    Relevant Orders    POC Glycohemoglobin [13524] (Completed)    Hematuria, unspecified type        Relevant Orders    POC Urinalysis, Manual Dip without microscopy [94962] (Completed)        Discussed with patient regarding UA is normal. Prediabetes is stable. Will check her thyroid.

## 2024-02-28 LAB
APPEARANCE: CLEAR
BILIRUBIN: NEGATIVE
CARTRIDGE LOT#: ABNORMAL NUMERIC
GLUCOSE (URINE DIPSTICK): NEGATIVE MG/DL
HEMOGLOBIN A1C: 6.3 % (ref 4.3–5.6)
KETONES (URINE DIPSTICK): NEGATIVE MG/DL
MULTISTIX LOT#: ABNORMAL NUMERIC
NITRITE, URINE: NEGATIVE
OCCULT BLOOD: NEGATIVE
PH, URINE: 5.5 (ref 4.5–8)
PROTEIN (URINE DIPSTICK): NEGATIVE MG/DL
SPECIFIC GRAVITY: 1.03 (ref 1–1.03)
URINE-COLOR: YELLOW
UROBILINOGEN,SEMI-QN: 0.2 MG/DL (ref 0–1.9)

## 2024-03-05 DIAGNOSIS — Z79.01 CURRENT USE OF ANTICOAGULANT THERAPY: ICD-10-CM

## 2024-03-05 DIAGNOSIS — Z86.718 HISTORY OF DVT (DEEP VEIN THROMBOSIS): ICD-10-CM

## 2024-03-08 ENCOUNTER — TELEPHONE (OUTPATIENT)
Dept: HEMATOLOGY/ONCOLOGY | Facility: HOSPITAL | Age: 54
End: 2024-03-08

## 2024-03-08 NOTE — TELEPHONE ENCOUNTER
** LVM to move the pt time up to an earlier time same day, Dr. Tolbert is out of the office, Called 3/8/24 gr**

## 2024-03-21 ENCOUNTER — ANESTHESIA (OUTPATIENT)
Dept: SURGERY | Facility: HOSPITAL | Age: 54
End: 2024-03-21
Payer: COMMERCIAL

## 2024-03-21 ENCOUNTER — HOSPITAL ENCOUNTER (OUTPATIENT)
Facility: HOSPITAL | Age: 54
Setting detail: HOSPITAL OUTPATIENT SURGERY
Discharge: HOME | End: 2024-03-21
Attending: ORTHOPAEDIC SURGERY | Admitting: ORTHOPAEDIC SURGERY
Payer: COMMERCIAL

## 2024-03-21 ENCOUNTER — ANESTHESIA EVENT (OUTPATIENT)
Dept: SURGERY | Facility: HOSPITAL | Age: 54
End: 2024-03-21
Payer: COMMERCIAL

## 2024-03-21 VITALS
DIASTOLIC BLOOD PRESSURE: 91 MMHG | TEMPERATURE: 98 F | OXYGEN SATURATION: 99 % | SYSTOLIC BLOOD PRESSURE: 137 MMHG | HEART RATE: 83 BPM | BODY MASS INDEX: 41.14 KG/M2 | HEIGHT: 66 IN | WEIGHT: 256 LBS | RESPIRATION RATE: 16 BRPM

## 2024-03-21 DIAGNOSIS — G56.02 CARPAL TUNNEL SYNDROME OF LEFT WRIST: Primary | ICD-10-CM

## 2024-03-21 LAB — B-HCG UR QL: NEGATIVE

## 2024-03-21 PROCEDURE — 81025 URINE PREGNANCY TEST: CPT

## 2024-03-21 PROCEDURE — 0L840ZZ DIVISION OF LEFT UPPER ARM TENDON, OPEN APPROACH: ICD-10-PCS | Performed by: ORTHOPAEDIC SURGERY

## 2024-03-21 PROCEDURE — 01Q40ZZ REPAIR ULNAR NERVE, OPEN APPROACH: ICD-10-PCS | Performed by: ORTHOPAEDIC SURGERY

## 2024-03-21 PROCEDURE — 76942 ECHO GUIDE FOR BIOPSY: CPT | Performed by: STUDENT IN AN ORGANIZED HEALTH CARE EDUCATION/TRAINING PROGRAM

## 2024-03-21 PROCEDURE — 93005 ELECTROCARDIOGRAM TRACING: CPT

## 2024-03-21 PROCEDURE — 01N40ZZ RELEASE ULNAR NERVE, OPEN APPROACH: ICD-10-PCS | Performed by: ORTHOPAEDIC SURGERY

## 2024-03-21 PROCEDURE — 93010 ELECTROCARDIOGRAM REPORT: CPT | Performed by: INTERNAL MEDICINE

## 2024-03-21 PROCEDURE — 01N50ZZ RELEASE MEDIAN NERVE, OPEN APPROACH: ICD-10-PCS | Performed by: ORTHOPAEDIC SURGERY

## 2024-03-21 RX ORDER — PROCHLORPERAZINE EDISYLATE 5 MG/ML
5 INJECTION INTRAMUSCULAR; INTRAVENOUS EVERY 8 HOURS PRN
Status: DISCONTINUED | OUTPATIENT
Start: 2024-03-21 | End: 2024-03-21

## 2024-03-21 RX ORDER — ONDANSETRON 2 MG/ML
INJECTION INTRAMUSCULAR; INTRAVENOUS AS NEEDED
Status: DISCONTINUED | OUTPATIENT
Start: 2024-03-21 | End: 2024-03-21 | Stop reason: SURG

## 2024-03-21 RX ORDER — SODIUM CHLORIDE, SODIUM LACTATE, POTASSIUM CHLORIDE, CALCIUM CHLORIDE 600; 310; 30; 20 MG/100ML; MG/100ML; MG/100ML; MG/100ML
INJECTION, SOLUTION INTRAVENOUS CONTINUOUS
Status: DISCONTINUED | OUTPATIENT
Start: 2024-03-21 | End: 2024-03-21

## 2024-03-21 RX ORDER — TRANEXAMIC ACID 10 MG/ML
INJECTION, SOLUTION INTRAVENOUS AS NEEDED
Status: DISCONTINUED | OUTPATIENT
Start: 2024-03-21 | End: 2024-03-21 | Stop reason: SURG

## 2024-03-21 RX ORDER — BUPIVACAINE HYDROCHLORIDE 5 MG/ML
INJECTION, SOLUTION EPIDURAL; INTRACAUDAL AS NEEDED
Status: DISCONTINUED | OUTPATIENT
Start: 2024-03-21 | End: 2024-03-21 | Stop reason: HOSPADM

## 2024-03-21 RX ORDER — HYDROCODONE BITARTRATE AND ACETAMINOPHEN 5; 325 MG/1; MG/1
2 TABLET ORAL ONCE AS NEEDED
Status: COMPLETED | OUTPATIENT
Start: 2024-03-21 | End: 2024-03-21

## 2024-03-21 RX ORDER — HYDROCODONE BITARTRATE AND ACETAMINOPHEN 5; 325 MG/1; MG/1
1 TABLET ORAL EVERY 6 HOURS PRN
Qty: 20 TABLET | Refills: 0 | Status: SHIPPED | OUTPATIENT
Start: 2024-03-21

## 2024-03-21 RX ORDER — HYDROMORPHONE HYDROCHLORIDE 1 MG/ML
0.2 INJECTION, SOLUTION INTRAMUSCULAR; INTRAVENOUS; SUBCUTANEOUS EVERY 5 MIN PRN
Status: DISCONTINUED | OUTPATIENT
Start: 2024-03-21 | End: 2024-03-21

## 2024-03-21 RX ORDER — SCOLOPAMINE TRANSDERMAL SYSTEM 1 MG/1
1 PATCH, EXTENDED RELEASE TRANSDERMAL ONCE
Status: DISCONTINUED | OUTPATIENT
Start: 2024-03-21 | End: 2024-03-21 | Stop reason: HOSPADM

## 2024-03-21 RX ORDER — GLYCOPYRROLATE 0.2 MG/ML
INJECTION, SOLUTION INTRAMUSCULAR; INTRAVENOUS AS NEEDED
Status: DISCONTINUED | OUTPATIENT
Start: 2024-03-21 | End: 2024-03-21 | Stop reason: SURG

## 2024-03-21 RX ORDER — CEFAZOLIN SODIUM/WATER 2 G/20 ML
2 SYRINGE (ML) INTRAVENOUS ONCE
Status: COMPLETED | OUTPATIENT
Start: 2024-03-21 | End: 2024-03-21

## 2024-03-21 RX ORDER — HYDROMORPHONE HYDROCHLORIDE 1 MG/ML
0.4 INJECTION, SOLUTION INTRAMUSCULAR; INTRAVENOUS; SUBCUTANEOUS EVERY 5 MIN PRN
Status: DISCONTINUED | OUTPATIENT
Start: 2024-03-21 | End: 2024-03-21

## 2024-03-21 RX ORDER — PHENYLEPHRINE HCL 10 MG/ML
VIAL (ML) INJECTION AS NEEDED
Status: DISCONTINUED | OUTPATIENT
Start: 2024-03-21 | End: 2024-03-21 | Stop reason: SURG

## 2024-03-21 RX ORDER — LIDOCAINE HYDROCHLORIDE 10 MG/ML
INJECTION, SOLUTION EPIDURAL; INFILTRATION; INTRACAUDAL; PERINEURAL AS NEEDED
Status: DISCONTINUED | OUTPATIENT
Start: 2024-03-21 | End: 2024-03-21 | Stop reason: SURG

## 2024-03-21 RX ORDER — ONDANSETRON 2 MG/ML
4 INJECTION INTRAMUSCULAR; INTRAVENOUS EVERY 6 HOURS PRN
Status: DISCONTINUED | OUTPATIENT
Start: 2024-03-21 | End: 2024-03-21

## 2024-03-21 RX ORDER — LIDOCAINE HYDROCHLORIDE AND EPINEPHRINE 10; 10 MG/ML; UG/ML
INJECTION, SOLUTION INFILTRATION; PERINEURAL AS NEEDED
Status: DISCONTINUED | OUTPATIENT
Start: 2024-03-21 | End: 2024-03-21 | Stop reason: HOSPADM

## 2024-03-21 RX ORDER — HYDROCODONE BITARTRATE AND ACETAMINOPHEN 5; 325 MG/1; MG/1
1 TABLET ORAL ONCE AS NEEDED
Status: COMPLETED | OUTPATIENT
Start: 2024-03-21 | End: 2024-03-21

## 2024-03-21 RX ORDER — ESMOLOL HYDROCHLORIDE 10 MG/ML
INJECTION INTRAVENOUS AS NEEDED
Status: DISCONTINUED | OUTPATIENT
Start: 2024-03-21 | End: 2024-03-21 | Stop reason: SURG

## 2024-03-21 RX ORDER — HYDROMORPHONE HYDROCHLORIDE 1 MG/ML
0.6 INJECTION, SOLUTION INTRAMUSCULAR; INTRAVENOUS; SUBCUTANEOUS EVERY 5 MIN PRN
Status: DISCONTINUED | OUTPATIENT
Start: 2024-03-21 | End: 2024-03-21

## 2024-03-21 RX ORDER — METOPROLOL TARTRATE 1 MG/ML
INJECTION, SOLUTION INTRAVENOUS AS NEEDED
Status: DISCONTINUED | OUTPATIENT
Start: 2024-03-21 | End: 2024-03-21 | Stop reason: SURG

## 2024-03-21 RX ORDER — NALOXONE HYDROCHLORIDE 0.4 MG/ML
0.08 INJECTION, SOLUTION INTRAMUSCULAR; INTRAVENOUS; SUBCUTANEOUS AS NEEDED
Status: DISCONTINUED | OUTPATIENT
Start: 2024-03-21 | End: 2024-03-21

## 2024-03-21 RX ORDER — ACETAMINOPHEN 500 MG
1000 TABLET ORAL ONCE AS NEEDED
Status: COMPLETED | OUTPATIENT
Start: 2024-03-21 | End: 2024-03-21

## 2024-03-21 RX ORDER — ACETAMINOPHEN 500 MG
1000 TABLET ORAL ONCE
Status: DISCONTINUED | OUTPATIENT
Start: 2024-03-21 | End: 2024-03-21 | Stop reason: HOSPADM

## 2024-03-21 RX ORDER — ROPIVACAINE HYDROCHLORIDE 5 MG/ML
INJECTION, SOLUTION EPIDURAL; INFILTRATION; PERINEURAL AS NEEDED
Status: DISCONTINUED | OUTPATIENT
Start: 2024-03-21 | End: 2024-03-21 | Stop reason: SURG

## 2024-03-21 RX ADMIN — TRANEXAMIC ACID 1000 MG: 10 INJECTION, SOLUTION INTRAVENOUS at 16:30:00

## 2024-03-21 RX ADMIN — ESMOLOL HYDROCHLORIDE 30 MG: 10 INJECTION INTRAVENOUS at 16:39:00

## 2024-03-21 RX ADMIN — ESMOLOL HYDROCHLORIDE 30 MG: 10 INJECTION INTRAVENOUS at 17:05:00

## 2024-03-21 RX ADMIN — LIDOCAINE HYDROCHLORIDE 5 MG: 10 INJECTION, SOLUTION EPIDURAL; INFILTRATION; INTRACAUDAL; PERINEURAL at 16:20:00

## 2024-03-21 RX ADMIN — SODIUM CHLORIDE, SODIUM LACTATE, POTASSIUM CHLORIDE, CALCIUM CHLORIDE: 600; 310; 30; 20 INJECTION, SOLUTION INTRAVENOUS at 16:05:00

## 2024-03-21 RX ADMIN — PHENYLEPHRINE HCL 100 MCG: 10 MG/ML VIAL (ML) INJECTION at 17:40:00

## 2024-03-21 RX ADMIN — PHENYLEPHRINE HCL 100 MCG: 10 MG/ML VIAL (ML) INJECTION at 17:48:00

## 2024-03-21 RX ADMIN — CEFAZOLIN SODIUM/WATER 2 G: 2 G/20 ML SYRINGE (ML) INTRAVENOUS at 16:22:00

## 2024-03-21 RX ADMIN — ROPIVACAINE HYDROCHLORIDE 20 ML: 5 INJECTION, SOLUTION EPIDURAL; INFILTRATION; PERINEURAL at 16:20:00

## 2024-03-21 RX ADMIN — METOPROLOL TARTRATE 2.5 MG: 1 INJECTION, SOLUTION INTRAVENOUS at 17:34:00

## 2024-03-21 RX ADMIN — SODIUM CHLORIDE, SODIUM LACTATE, POTASSIUM CHLORIDE, CALCIUM CHLORIDE: 600; 310; 30; 20 INJECTION, SOLUTION INTRAVENOUS at 18:21:00

## 2024-03-21 RX ADMIN — GLYCOPYRROLATE 0.2 MG: 0.2 INJECTION, SOLUTION INTRAMUSCULAR; INTRAVENOUS at 16:23:00

## 2024-03-21 RX ADMIN — ONDANSETRON 4 MG: 2 INJECTION INTRAMUSCULAR; INTRAVENOUS at 17:34:00

## 2024-03-21 RX ADMIN — PHENYLEPHRINE HCL 100 MCG: 10 MG/ML VIAL (ML) INJECTION at 17:55:00

## 2024-03-21 RX ADMIN — PHENYLEPHRINE HCL 100 MCG: 10 MG/ML VIAL (ML) INJECTION at 18:01:00

## 2024-03-21 NOTE — ANESTHESIA PREPROCEDURE EVALUATION
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  Temp: 98 °F (36.7 °C)  Pulse: 76  Resp: 18  BP: 117/77  SpO2: 96 %  Body mass index is 41.32 kg/m².    Current medications reviewed.  Hospital Medications:   [MAR Hold] acetaminophen (Tylenol Extra Strength) tab 1,000 mg  1,000 mg Oral Once    [MAR Hold] scopolamine (Transderm-Scop) 1 MG/3DAYS patch 1 patch  1 patch Transdermal Once    lactated ringers infusion   Intravenous Continuous    ceFAZolin (Ancef) 2 g in 20mL IV syringe premix  2 g Intravenous Once       Outpatient Medications:     Medications Prior to Admission   Medication Sig Dispense Refill Last Dose    rosuvastatin 40 MG Oral Tab TAKE 1 TABLET BY MOUTH EVERY EVENING 90 tablet 3 3/20/2024 at 2000    levothyroxine 125 MCG Oral Tab Take 1 tablet (125 mcg total) by mouth before breakfast. 90 tablet 3 3/21/2024 at 1045    Cholecalciferol (VITAMIN D) 1000 units Oral Tab Take 1,000 Units by mouth daily.     3/18/2024    rivaroxaban (XARELTO) 10 MG Oral Tab Take 1 tablet (10 mg total) by mouth daily with food. 90 tablet 3 3/18/2024       Allergies: Aspirin      Anesthesia Evaluation    Patient summary reviewed.    Anesthetic Complications           GI/Hepatic/Renal             (-) chronic renal disease   (-) liver disease                 Cardiovascular        Exercise tolerance: good     MET: >4    (+) obesity     (+) hyperlipidemia  (-) CAD  (-) past  MI                (-) angina     (-) ADEN         Endo/Other      (-) diabetes     (+) hypothyroidism                (+) arthritis       Pulmonary                    (+) sleep apnea and noncompliant      Neuro/Psych                                      Past Surgical History:   Procedure Laterality Date    BONE MARROW ASPIRATE &BIOPSY  05/15/2014    BONE MARROW/BLOOD-DERIVED PERIPHERAL STEM CELL TRANSPLANTAT; ALLOGENEIC DONOR LYMPHOCYTE INFUSIONS  2014    stem cell transplant    COLONOSCOPY N/A 2/18/2019    Procedure: COLONOSCOPY;  Surgeon: Larry Ladd MD;  Location: [facility] ENDOSCOPY    HERNIA SURGERY  11/1999    Umbilical hernia repair    FUAD BIOPSY STEREO NODULE 1 SITE LEFT (CPT=19081)      2015    FUAD BIOPSY STEREO NODULE 2 SITE BILAT (CPT=19081/15058)  08/19/2015    OTHER SURGICAL HISTORY      Injection Tendon Sheath, Ligament    TUBAL LIGATION  2001     Social History     Socioeconomic History    Marital status:    Tobacco Use    Smoking status: Never    Smokeless tobacco: Never   Vaping Use    Vaping Use: Never used   Substance and Sexual Activity    Alcohol use: Yes     Comment: Occ, once a month    Drug use: No    Sexual activity: Yes     Partners: Male     Birth control/protection: Tubal Ligation   Other Topics Concern    Caffeine Concern Yes     Comment: Soda, Coffee - 2 cups daily     History   Drug Use No     Available pre-op labs reviewed.               Airway      Mallampati: III  Mouth opening: >3 FB  TM distance: > 6 cm  Neck ROM: full Cardiovascular    Cardiovascular exam normal.         Dental             Pulmonary    Pulmonary exam normal.                 Other findings              ASA: 2   Plan: MAC  NPO status verified and Patient does not meet NPO guidelines.  Patient has not taken beta blockers in last 24 hours.  Post-procedure pain management plan discussed with surgeon and patient.    Comment:     A detailed discussion about the anesthetic plan was held with Daisy Hamm  in the preoperative area. Benefits and risks of MAC anesthesia were discussed, including intraoperative awareness/recall, PONV, reasonable expectations of post-operative pain/discomfort, aspiration, conversion to general anesthesia, dental injury, pressure/nerve injuries from positioning, and other serious but rare complications (life-threatening cardiopulmonary events). All questions were answered appropriately and patient demonstrated understanding of realistic expectations and risks of undergoing anesthesia. Daisy Hamm consents to receiving anesthesia and wishes to proceed.    I also explained option and benefit of brachial plexus block including significant pain relief following the surgical procedure, decreased need for postoperative opioid use. Realistic expectation for block duration was discussed as well. I also explained possible downsides of peripheral nerve blocks including failed block, prolonged nerve blockade leading to prolonged numbness in upper extremity and hand, with possible muscle weakness necessitating arm sling, phrenic nerve involvement, temporary facial droopiness, and hematoma formation. Other exceedingly rare complications such as local anesthetic systemic toxicity (LAST), infection, and permanent nerve damage was also discussed. All questions were answered and patient demonstrated understanding of realistic expectations and risks of peripheral nerve blocks, and wishes to proceed with the procedure. Sites of block were marked by me with patient confirmation.     Plan/risks discussed with: patient                Present on Admission:  **None**

## 2024-03-21 NOTE — ANESTHESIA PROCEDURE NOTES
Regional Block    Date/Time: 3/21/2024 4:15 PM    Performed by: Dede Sheikh MD  Authorized by: Dede Sheikh MD      General Information and Staff    Start Time:  3/21/2024 4:15 PM  End Time:  3/21/2024 4:20 PM  Anesthesiologist:  Dede Sheikh MD  Performed by:  Anesthesiologist  Patient Location:  OR    Block Placement: Pre Induction  Site Identification: real time ultrasound guided and image stored and retrievable    Block site/laterality marked before start: site marked  Reason for Block: at surgeon's request and post-op pain management    Preanesthetic Checklist: 2 patient identifers, IV checked, risks and benefits discussed, monitors and equipment checked, pre-op evaluation, timeout performed, anesthesia consent, sterile technique used, no prohibitive neurological deficits and no local skin infection at insertion site      Procedure Details    Patient Position:  Sitting  Prep: ChloraPrep    Monitoring:  Cardiac monitor, continuous pulse ox and blood pressure cuff  Block Type:  Supraclavicular  Laterality:  Left  Injection Technique:  Single-shot    Needle    Needle Type:  Short-bevel and echogenic  Needle Gauge:  22 G  Needle Length:  50 mm  Needle Localization:  Ultrasound guidance  Reason for Ultrasound Use: appropriate spread of the medication was noted in real time and no ultrasound evidence of intravascular and/or intraneural injection            Assessment    Injection Assessment:  Good spread noted, negative resistance, negative aspiration for heme, incremental injection, low pressure, local visualized surrounding nerve on ultrasound and no pain on injection  Paresthesia Pain:  None  Heart Rate Change: No    - Patient tolerated block procedure well without evidence of immediate block related complications.     Medications  3/21/2024 4:15 PM      Additional Comments    Medication:  Ropivacaine 0.5% 20mL

## 2024-03-21 NOTE — H&P
Clinic Note [facility]     Assessment/Plan:  53 year old female    Left cubital and carpal tunnel syndrome- confirmed by [facility]. Surgical and nonsurgical treatment options were reviewed as well as their expected outcome. Surgical intervention through cubital + carpal tunnel release and Guyon's canal release was recommended to provide the best possible functional outcome.  We discussed the risk associated surgery, expected outcomes, time to recovery and the possible need for therapy postoperatively.  Patient consent to the operation having understood all the above. Scheduled Surgery: March 22,2024.Surgical packet provided. Informed patient to discontinue Xarelto prior to surgery.  Right cubital and carpal tunnel syndrome- confirmed by EMG. Surgical intervention was recommended to provide the best possible functional outcome. Left hand will be addressed before the right hand. Scheduled Surgery: June 14, 2024  Left wrist synovial cyst- Reviewed MRI; results were normal.  Patient takes Xarelto and has been recommended to avoid NSAIDs.    Follow Up: scheduled surgery: March 22,2024    Diagnostic Studies:  EMG/NCV:   1.electrodiagnostic evidence of a bilateral demyelinating and axonal ulnar mononeuropathy at the elbow with likely cubital tunnel syndrome.  There is sensory involvement in bilateral upper extremities.  There is borderline prolonged latency with decreased conduction velocity on motor ulnar nerve conduction studies as well.  Patient has a positive Wartenberg's sign in the left hand.  There are signs of active denervation in the left FCU muscle with signs of chronic reinnervation and signs of chronic reinnervation in the left FDI muscle.  Recommend cubital tunnel brace at nighttime in the left hand and orthopedic evaluation for consideration of surgical intervention.  2.There is electrodiagnostic evidence of a BILATERAL median sensory demyelinating more than axonal mononeuropathy at the wrist consistent  with carpal tunnel syndrome.  There are signs of active denervation in the left APB muscle.     Physical Exam:    /77 (BP Location: Right arm)   Pulse 76   Temp 98 °F (36.7 °C) (Oral)   Resp 18   Ht 5' 6\" (1.676 m)   Wt 256 lb (116.1 kg)   LMP  (LMP Unknown)   SpO2 96%   BMI 41.32 kg/m²     Constitutional: NAD. AOx3. Well-developed and Well-nourished.   Psychiatric: Normal mood/ affect/ behavior. Judgment and thought content normal.     Bilateral upper Extremity:   Inspection: Skin Intact. No skin lesions. No gross deformity.   Palpation:  No focal areas of tenderness   Motion: Elbow: normal bilateral symmetric ext/flex  Wrist: normal bilateral symmetric ext/flex/sup/pro  Finger: full composite fist   Vascular 2+ radial pulse   Special:                       Positive elbow flexion test      Median Nerve Exam Right Left   Phdurkan neg neg   Sensation normal normal   PCBr Sensation normal normal   APB Weakness No No   Thenar Atrophy No No     Ulnar Nerve Exam Right Left   Tinels + +   EF neg neg   Hypermobility @ elbow + +   Sensation normal normal   1st ROSANNA Weakness No Yes   Hypothenar Atrophy No No     Radial Nerve Exam  Right Left   Radial Sensory normal normal     CC: Pain in left hand    HPI: This 53 year old right-hand-dominant female presents with pain in the left hand.  She reports moderate severity pain.  She characterizes the pain as cramping, tingling, and burning.  She has tried a brace.  She reports nighttime symptoms.   Back in May, the patient noticed a painful lump on the side of her wrist. Given her history of lymphoma, she consulted her oncologist, who referred her to Dr. Gaitan. She had undergone chemotherapy for her lymphoma. The lump was diagnosed as a ganglion cyst. Currently, she reports issues with her pinky finger, noting that it doesn't move as it should. Additionally, she experiences coldness in parts of her hand and nighttime tingling sensations that interrupt her sleep. While  she does have some right hand numbness, it's not significantly noticeable. Left side weaker than the right side.    Interval History (11/29/23) Progression of symptoms have remained the constant. The pain has caused modifications in activities of daily living.    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) 2022    High cholesterol     History of blood transfusion     Hypothyroidism     Hypothyroidism, primary    Other malignant lymphomas of intra-abdominal lymph nodes 05/15/2014    Personal history of antineoplastic chemotherapy 11/11/2014    Port catheter in place 06/14/2014    Rheumatoid arthritis ([facility])     Sleep apnea     NO DEVICE USED.    Swelling of limb 05/20/2014    Visual impairment     GLASSES     Past Surgical History:   Procedure Laterality Date    BONE MARROW ASPIRATE &BIOPSY  05/15/2014    BONE MARROW/BLOOD-DERIVED PERIPHERAL STEM CELL TRANSPLANTAT; ALLOGENEIC DONOR LYMPHOCYTE INFUSIONS  2014    stem cell transplant    COLONOSCOPY N/A 2/18/2019    Procedure: COLONOSCOPY;  Surgeon: Larry Ladd MD;  [facility]: [facility] ENDOSCOPY    HERNIA SURGERY  11/1999    Umbilical hernia repair    FUAD BIOPSY STEREO NODULE 1 SITE LEFT (CPT=19081)      2015    FUAD BIOPSY STEREO NODULE 2 SITE BILAT (CPT=19081/99262)  08/19/2015    OTHER SURGICAL HISTORY      Injection Tendon Sheath, Ligament    TUBAL LIGATION  2001     No current outpatient medications on file.     Allergies   Allergen Reactions    Aspirin OTHER (SEE COMMENTS)     Other reaction(s): GI problems     Family History   Problem Relation Age of Onset    Diabetes Father     Heart Disease Father     Other (Other) Father     Thyroid disease Sister     Other (lupus) Sister     Other (sjogren's) Sister     Heart Disease Other         Grandparents     Other (Other) Mother         Rheumatoid Arthritis    Breast Cancer Sister 47    Other (Other) Brother         Hemochromatosis    Melanoma Maternal Grandfather     Breast Cancer Paternal Aunt 65     Social History     Occupational History    Not on file   Tobacco Use    Smoking status: Never    Smokeless tobacco: Never   Vaping Use    Vaping Use: Never used   Substance and Sexual Activity    Alcohol use: Yes     Comment: Occ, once a month    Drug use: No    Sexual activity: Yes     Partners: Male     Birth control/protection: Tubal Ligation        Review of Systems (negative unless bolded):  General: fevers, chills, fatigue  CV:  chest pain, palpitations, leg swelling  Msk: bodyaches, neck pain, neck stiffness  Skin: rashes, open wounds, nonhealing ulcers  Hem: bleeds easily, bruise easily, immunocompromised  Neuro: dizziness, light headedness, headaches  Psych: anxious, depressed, anger issues    Attention: This note has been scribed by Terri Angel under the supervision of Willy Gaitan MD.       Willy Gaitan MD  Hand, Wrist, & Elbow Surgery  [facility]  [address] [place] [address], [address], [email or web address]  t: [phone]  f: [phone]

## 2024-03-21 NOTE — OPERATIVE REPORT
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]    Surgeon(s) and Role:     * Willy Gaitan MD - Primary     Assistant:  Brien Montes SA    Procedures:     Decompression of  left ulnar nerve at elbow with transmuscular transposition with internal neurolysis of  1 FCU nerve branches (CPT 92625)  Left elbow flexor pronator lengthening (CPT 64110)  Decompression of left ulnar nerve in guyon's canal (CPT 25737)   Decompression of left ulnar motor nerve branch (CPT 44022)   Decompression of left median nerve in carpal tunnel (CPT 00612)     Antibiotics: Ancef 2g    Surgical Findings: mild hourglass deformity of ulnar nerve, FCU branches distal to medial epicondyle    Anesthesia: MAC + Regional    Complications: None    Condition: Stable    Estimated Blood Loss: 15 mL    Indications:  53 year old female with cubital tunnel syndrome and carpal tunnel syndrome that failed non-surgical management . The risks associated with the procedure, expected  outcome, the expected time to recovery, and the rehab required were reviewed. All of the patient's questions were answered.        Procedure:  Patient was met in the preoperative holding area where consent was verified, laterality was marked with the surgeon's initials, and the H&P was updated. Patient was brought to the operating room on a transport cart.  Patient was transferred from the transport cart onto the operating room table and placed in a supine position.  Antibiotics were fully infused. The arm was prepped and draped in the usual sterile fashion. An sterile upper arm tourniquet was placed.   A surgical timeout was performed.  IV TXA was given.  The limb was elevated and exsanguinated using Esmarch bandage.  Tourniquet was raised to 250 mmHg.  15 blade was used to make an incision through the dermis extending it in a Shaniqua type fashion proximal to the proximal wrist crease.  Adson's and White scissors were used to dissect through subcutaneous tissue onto the antebrachial fascia and volar carpal ligament.  Small crossing blood vessels were cauterized with bipolar cautery.  A small cutaneous branch was identified in the distal aspect of the incision was protected for the remainder of the case.  The ulnar artery and vein were identified and they stick antebrachial fascia proximally was divided.  Once the ulnar neurovascular bundle was decompressed proximally and distally, it was gently retracted medially.  The hook of the hamate was identified. The proximal tendinous edge of the hypothenar fascia was identified. It was released along ulnar border of hook of hamate.  This allowed visualization of the deep motor branch of the ulnar nerve.  The remaining fascia and hypothenar muscle fibers were divided allowing complete decompression of the deep motor branch.  A 15 blade was used to make an incision radial to hook of hamate over the carpal tunnel in line with the radial border of ring finger. The carpal tunnel was divided longitudinally along the entirety of its length.  A tight carpal tunnel was noted.    The soft tissue was dissected off the proximal edge of transverse carpal ligament and antebrachial fascia superficial and deep to it using White scissors.  15 blade was used to divide the distal antebrachial fascia.  A 15 blade was used to make an incision through the dermis.  Adson and Whtie scissors were used to dissect through the subcutaneous tissue using bipolar cautery to cauterize any small crossing vessels.  Medial antebrachial cutaneous nerve was found roughly 3-1/2 cm distal to the medial epicondyle.  This cutaneous nerve was then protected for  the remainder of the case.  The dissection was meticulously carried down to the Phelps's fascia.  The ulnar nerve was found just proximal to Phelps's fascia along the medial aspect of the triceps.  Phelps's fascia was divided and the decompression was carried distally to the 2 FCU heads.  The fascia overlying the FCU muscle was carefully  off of the FCU muscle underneath and then divided.  Motor branches from the ulnar nerve to the FCU muscle were visualized and protected.  The FCU muscle belly was carefully spread to decompress the nerve distally.  Bipolar cautery was used to create a full-thickness anterior flap in order to identify the flexor pronator muscle/fascia.  The intermuscular septum was then identified proximally and excised.  2 long fascial flaps were made from the flexor pronator fascia 1 proximally based more distally based.  The fascial flap was sharply elevated off of the muscle.  The deep flexor pronator aponeurosis was identified and the muscle overlying it was released from the proximal to distal fashion to maintain its innervation.  The fascia was then sharply elevated off the muscle underneath it preserving the muscle fibers.  Fascial elements anterior to the FCU (medial epicondyle head) was identified and excised.  The FCU muscle was released off of the epicondyle from a proximal to distal fashion to maintain muscle innervation.  The soft tissue attachments of the ulnar nerve were circumferentially released to allow total mobilization of the ulnar nerve.  Neurolysis of the posterior branch of the ulnar nerve going to the FCU muscle was performed in order to facilitate transposition.  Once the ulnar nerve was transposed anterior to the medial epicondyle, the fascial flaps were repaired using 0 Vicryl.  The ulnar nerve was traced proximal to distal evaluating for any kink points/compression points.  No further points of compression or kinking was noted.  The elbow was taken through  range of motion and no nerve subluxation was noted and no compression of the nerve was noted.  The tourniquet was let down and hemostasis was achieved with pressure and bipolar cautery.  Once hemostasis was achieved, the wound was irrigated with sterile saline prior to closure. A 10 Fr drain was placed in the wound bed exiting out a separate distal stab incision. It was suture in placed with 3-0 nylon suture.    Closure:  3-0 Monocryl was used to reapproximate the skin edges.  4-0 Monocryl was run in a subcuticular fashion.  Skin glue and Steri-Strips were applied    Dressing/Splint:  Tegaderm with a nonadherent pad was applied to the incision.  Webril was applied and held in place with an Ace wrap.  The patient's arm was then placed in a sling.    Post Operative:  Patient was woken up from anesthesia and taken to PACU for further recovery and discharge home.    Willy Gaitan MD   Hand, Wrist, & Elbow Surgery  [email or web address]  t: [phone]  f: [phone]

## 2024-03-21 NOTE — DISCHARGE INSTRUCTIONS
Dressing: Keep dressing on until follow up. Keep dressing clean clean/dry/intact.    Weight Bearing: No lifting greater than 1lbs with hand.    Activity: Come out of sling 2-3 times per day to work gentle elbow range of motion. Ice and elevate to help minimize swelling.    Pain: Take acetaminophen for pain control. Take Norco 325-5mg for breakthrough pain.     Drain: Check to make sure bulb is compressed down to maintain suction. When bulb is full, empty and record amount of fluid emptied.     Call for follow up appointment if you don't have one.       HOW TO EMPTY YOUR DRAIN    Wash hands with soap and water  Hold drain so plug is upright and release it  Turn drain upside down into measuring container and squeeze drain until all the liquid is removed  While squeezing the drain, replace the plug into drain  Measure the liquid and record the amount in your diary twice a day  Be sure to bring your diary to your next visit with the doctor          Drain Record Sheet    Date Time Drain #1                                                                              You received Norco 5mg/325mg at 7:30 pm

## 2024-03-21 NOTE — ANESTHESIA POSTPROCEDURE EVALUATION
[facility]    Daisy Hamm Patient Status:  Hospital Outpatient Surgery   Age/Gender 53 year old female MRN [id number]   Location [facility] POST ANESTHESIA CARE UNIT Attending Willy Gaitan MD   Hosp Day # 0 PCP Windy Lima MD       Anesthesia Post-op Note    LEFT CARPAL TUNNEL RELEASE, GUYON'S CANAL RELEASE, AND ULNAR NERVE TRANSPOSITION    Procedure Summary       Date: 03/21/24 Room / Location:  MAIN OR 03 /  MAIN OR    Anesthesia Start: 1605 Anesthesia Stop: 1821    Procedure: LEFT CARPAL TUNNEL RELEASE, GUYON'S CANAL RELEASE, AND ULNAR NERVE TRANSPOSITION (Left: Lower Arm) Diagnosis:       Carpal tunnel syndrome of left wrist      Cubital tunnel syndrome on left      Guyon syndrome, left      (Carpal tunnel syndrome of left wrist [G56.02]Cubital tunnel syndrome on left [G56.22]Guyon syndrome, 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    Patient Location: PACU    Anesthesia Type: MAC    Airway Patency: patent    Postop Pain Control: adequate    Mental Status: preanesthetic baseline    Nausea/Vomiting: none    Cardiopulmonary/Hydration status: stable euvolemic    Complications: no apparent anesthesia related complications    Postop vital signs: stable    Dental Exam: Unchanged from Preop    Patient to be discharged from PACU when criteria met.

## 2024-03-22 LAB
ATRIAL RATE: 75 BPM
P AXIS: 59 DEGREES
P-R INTERVAL: 186 MS
Q-T INTERVAL: 400 MS
QRS DURATION: 82 MS
QTC CALCULATION (BEZET): 446 MS
R AXIS: 55 DEGREES
T AXIS: 67 DEGREES
VENTRICULAR RATE: 75 BPM

## 2024-03-25 ENCOUNTER — OFFICE VISIT (OUTPATIENT)
Dept: ORTHOPEDICS CLINIC | Facility: CLINIC | Age: 54
End: 2024-03-25
Payer: COMMERCIAL

## 2024-03-25 VITALS — WEIGHT: 256 LBS | HEIGHT: 66 IN | BODY MASS INDEX: 41.14 KG/M2

## 2024-03-25 DIAGNOSIS — G56.22 CUBITAL TUNNEL SYNDROME ON LEFT: ICD-10-CM

## 2024-03-25 DIAGNOSIS — G56.02 CARPAL TUNNEL SYNDROME OF LEFT WRIST: Primary | ICD-10-CM

## 2024-03-25 PROCEDURE — 99024 POSTOP FOLLOW-UP VISIT: CPT | Performed by: PHYSICIAN ASSISTANT

## 2024-03-25 PROCEDURE — 3008F BODY MASS INDEX DOCD: CPT | Performed by: PHYSICIAN ASSISTANT

## 2024-03-25 NOTE — PROGRESS NOTES
Clinic Note [facility]     Assessment/Plan:  54 year old female    Left elbow ulnar nerve transposition with Guyon's tunnel and carpal tunnel release 3/21/2024-I removed her drain today.  Her postop pain is slowly improving.  She will continue with range of motion only and working on that on her own.  I will see her back in 2 weeks for suture removal.  Right cubital and carpal tunnel syndrome- confirmed by EMG. Surgical intervention was recommended to provide the best possible functional outcome. Left hand will be addressed before the right hand. Scheduled Surgery: June 14, 2024  Left wrist synovial cyst- Reviewed MRI; results were normal.  Patient takes Xarelto and has been recommended to avoid NSAIDs.    Follow Up: 2 weeks for suture removal    Diagnostic Studies:  EMG/NCV:   1.electrodiagnostic evidence of a bilateral demyelinating and axonal ulnar mononeuropathy at the elbow with likely cubital tunnel syndrome.  There is sensory involvement in bilateral upper extremities.  There is borderline prolonged latency with decreased conduction velocity on motor ulnar nerve conduction studies as well.  Patient has a positive Wartenberg's sign in the left hand.  There are signs of active denervation in the left FCU muscle with signs of chronic reinnervation and signs of chronic reinnervation in the left FDI muscle.  Recommend cubital tunnel brace at nighttime in the left hand and orthopedic evaluation for consideration of surgical intervention.  2.There is electrodiagnostic evidence of a BILATERAL median sensory demyelinating more than axonal mononeuropathy at the wrist consistent with carpal tunnel syndrome.  There are signs of active denervation in the left APB muscle.     Physical Exam:    Ht 5' 6\" (1.676 m)   Wt 256 lb (116.1 kg)   LMP  (LMP Unknown)   BMI 41.32 kg/m²     Constitutional: NAD. AOx3. Well-developed and Well-nourished.   Psychiatric: Normal mood/ affect/ behavior. Judgment and thought content  normal.     Bilateral upper Extremity:   Inspection: Skin Intact. No skin lesions. No gross deformity.   Palpation:  No focal areas of tenderness   Motion: Elbow: normal bilateral symmetric ext/flex  Wrist: normal bilateral symmetric ext/flex/sup/pro  Finger: full composite fist   Vascular 2+ radial pulse   Special:                       Positive elbow flexion test      Median Nerve Exam Right Left   Phdurkan neg neg   Sensation normal normal   PCBr Sensation normal normal   APB Weakness No No   Thenar Atrophy No No     Ulnar Nerve Exam Right Left   Tinels + +   EF neg neg   Hypermobility @ elbow + +   Sensation normal normal   1st ROSANNA Weakness No Yes   Hypothenar Atrophy No No     Radial Nerve Exam  Right Left   Radial Sensory normal normal     CC: Pain in left hand    HPI: This 54 year old right-hand-dominant female presents with pain in the left hand.  She reports moderate severity pain.  She characterizes the pain as cramping, tingling, and burning.  She has tried a brace.  She reports nighttime symptoms.   Back in May, the patient noticed a painful lump on the side of her wrist. Given her history of lymphoma, she consulted her oncologist, who referred her to Dr. Gaitan. She had undergone chemotherapy for her lymphoma. The lump was diagnosed as a ganglion cyst. Currently, she reports issues with her pinky finger, noting that it doesn't move as it should. Additionally, she experiences coldness in parts of her hand and nighttime tingling sensations that interrupt her sleep. While she does have some right hand numbness, it's not significantly noticeable. Left side weaker than the right side.    Interval History: Patient underwent ulnar nerve transposition as well as Guyon's tunnel and carpal tunnel release.  Past Medical History:   Diagnosis Date    Abnormal mammogram of left breast 08/10/2015    Anaplastic large cell lymphoma, unspecified site, extranodal and solid organ sites 05/29/2014    Anxiety state      Depression     DVT (deep venous thrombosis) ([facility]) 2022    High cholesterol     History of blood transfusion     Hypothyroidism     Hypothyroidism, primary    Other malignant lymphomas of intra-abdominal lymph nodes 05/15/2014    Personal history of antineoplastic chemotherapy 11/11/2014    Port catheter in place 06/14/2014    Rheumatoid arthritis (HCC)     Sleep apnea     NO DEVICE USED.    Swelling of limb 05/20/2014    Visual impairment     GLASSES     Past Surgical History:   Procedure Laterality Date    BONE MARROW ASPIRATE &BIOPSY  05/15/2014    BONE MARROW/BLOOD-DERIVED PERIPHERAL STEM CELL TRANSPLANTAT; ALLOGENEIC DONOR LYMPHOCYTE INFUSIONS  2014    stem cell transplant    COLONOSCOPY N/A 2/18/2019    Procedure: COLONOSCOPY;  Surgeon: Larry Ladd MD;  Location: [facility] ENDOSCOPY    HERNIA SURGERY  11/1999    Umbilical hernia repair    FUAD BIOPSY STEREO NODULE 1 SITE LEFT (CPT=19081)      2015    FUAD BIOPSY STEREO NODULE 2 SITE BILAT (CPT=19081/19399)  08/19/2015    OTHER SURGICAL HISTORY      Injection Tendon Sheath, Ligament    TUBAL LIGATION  2001     Current Outpatient Medications   Medication Sig Dispense Refill    HYDROcodone-acetaminophen (NORCO) 5-325 MG Oral Tab Take 1 tablet by mouth every 6 (six) hours as needed for Pain. 20 tablet 0    rivaroxaban (XARELTO) 10 MG Oral Tab Take 1 tablet (10 mg total) by mouth daily with food. 90 tablet 3    rosuvastatin 40 MG Oral Tab TAKE 1 TABLET BY MOUTH EVERY EVENING 90 tablet 3    levothyroxine 125 MCG Oral Tab Take 1 tablet (125 mcg total) by mouth before breakfast. 90 tablet 3    Cholecalciferol (VITAMIN D) 1000 units Oral Tab Take 1,000 Units by mouth daily.         Allergies   Allergen Reactions    Aspirin OTHER (SEE COMMENTS)     Other reaction(s): GI problems     Family History   Problem Relation Age of Onset    Diabetes Father     Heart Disease Father     Other (Other) Father     Thyroid disease Sister     Other (lupus) Sister     Other (sjogren's)  Sister     Heart Disease Other         Grandparents    Other (Other) Mother         Rheumatoid Arthritis    Breast Cancer Sister 47    Other (Other) Brother         Hemochromatosis    Melanoma Maternal Grandfather     Breast Cancer Paternal Aunt 65     Social History     Occupational History    Not on file   Tobacco Use    Smoking status: Never    Smokeless tobacco: Never   Vaping Use    Vaping Use: Never used   Substance and Sexual Activity    Alcohol use: Yes     Comment: Occ, once a month    Drug use: No    Sexual activity: Yes     Partners: Male     Birth control/protection: Tubal Ligation        Review of Systems (negative unless bolded):  General: fevers, chills, fatigue  CV:  chest pain, palpitations, leg swelling  Msk: bodyaches, neck pain, neck stiffness  Skin: rashes, open wounds, nonhealing ulcers  Hem: bleeds easily, bruise easily, immunocompromised  Neuro: dizziness, light headedness, headaches  Psych: anxious, depressed, anger issues    Mae King PA-C  Hand, Wrist, & Elbow Surgery  Physician Assistant to Dr. Willy Gaitan  [facility]  [address] [place] [address], [address], [email or web address]  [email or web address]  t: [phone]  f: [phone]

## 2024-04-08 ENCOUNTER — OFFICE VISIT (OUTPATIENT)
Dept: ORTHOPEDICS CLINIC | Facility: CLINIC | Age: 54
End: 2024-04-08
Payer: COMMERCIAL

## 2024-04-08 ENCOUNTER — ORDER TRANSCRIPTION (OUTPATIENT)
Dept: ADMINISTRATIVE | Facility: HOSPITAL | Age: 54
End: 2024-04-08

## 2024-04-08 VITALS — WEIGHT: 256 LBS | HEIGHT: 66 IN | BODY MASS INDEX: 41.14 KG/M2

## 2024-04-08 DIAGNOSIS — G56.02 CARPAL TUNNEL SYNDROME OF LEFT WRIST: Primary | ICD-10-CM

## 2024-04-08 DIAGNOSIS — Z12.31 ENCOUNTER FOR SCREENING MAMMOGRAM FOR MALIGNANT NEOPLASM OF BREAST: Primary | ICD-10-CM

## 2024-04-08 DIAGNOSIS — G56.22 GUYON SYNDROME, LEFT: ICD-10-CM

## 2024-04-08 DIAGNOSIS — G56.22 CUBITAL TUNNEL SYNDROME ON LEFT: ICD-10-CM

## 2024-04-08 PROCEDURE — 3008F BODY MASS INDEX DOCD: CPT | Performed by: ORTHOPAEDIC SURGERY

## 2024-04-08 PROCEDURE — 99024 POSTOP FOLLOW-UP VISIT: CPT | Performed by: ORTHOPAEDIC SURGERY

## 2024-04-08 NOTE — PROGRESS NOTES
Clinic Note       Assessment/Plan:  54 year old female    Left elbow ulnar nerve transposition with Guyon's tunnel and carpal tunnel release 3/21/2024- Her postop pain is slowly improving.  Continue home exercise program.  Continue to monitor for sensory and motor recovery.  The tingling has improved which is reassuring.  Right cubital and carpal tunnel syndrome- confirmed by EMG. Surgical intervention was recommended to provide the best possible functional outcome. Left hand will be addressed before the right hand.  Patient was noted to have Wartenberg's sign on examination today.  Will add Guyon's canal release to the consent.  Scheduled Surgery: June 14, 2024  Left wrist synovial cyst- Reviewed MRI; results were normal.  Patient takes Xarelto and has been recommended to avoid NSAIDs.    Follow Up: 4 weeks    Diagnostic Studies:  EMG/NCV:   1.electrodiagnostic evidence of a bilateral demyelinating and axonal ulnar mononeuropathy at the elbow with likely cubital tunnel syndrome.  There is sensory involvement in bilateral upper extremities.  There is borderline prolonged latency with decreased conduction velocity on motor ulnar nerve conduction studies as well.  Patient has a positive Wartenberg's sign in the left hand.  There are signs of active denervation in the left FCU muscle with signs of chronic reinnervation and signs of chronic reinnervation in the left FDI muscle.  Recommend cubital tunnel brace at nighttime in the left hand and orthopedic evaluation for consideration of surgical intervention.  2.There is electrodiagnostic evidence of a BILATERAL median sensory demyelinating more than axonal mononeuropathy at the wrist consistent with carpal tunnel syndrome.  There are signs of active denervation in the left APB muscle.     Physical Exam:    Ht 5' 6\" (1.676 m)   Wt 256 lb (116.1 kg)   LMP  (LMP Unknown)   BMI 41.32 kg/m²     Constitutional: NAD. AOx3. Well-developed and Well-nourished.   Psychiatric:  Normal mood/ affect/ behavior. Judgment and thought content normal.     Bilateral upper Extremity:   Inspection: Skin Intact. No skin lesions. No gross deformity.   Palpation:  No focal areas of tenderness   Motion: Elbow: normal bilateral symmetric ext/flex  Wrist: normal bilateral symmetric ext/flex/sup/pro  Finger: full composite fist   Vascular 2+ radial pulse   Special:                       Positive elbow flexion test    Median Nerve Exam Right Left   Phdurkan neg neg   Sensation normal normal   PCBr Sensation normal normal   APB Weakness No No   Thenar Atrophy No No     Ulnar Nerve Exam Right Left   Tinels + -   EF neg neg   Hypermobility @ elbow + -   Sensation normal normal   1st ROASNNA Weakness No, (+) Wartenberg sign Yes, (+) Wartenberg sign   Hypothenar Atrophy No No     Radial Nerve Exam  Right Left   Radial Sensory normal normal     CC: Pain in left hand    HPI: This 54 year old right-hand-dominant female presents with pain in the left hand.  She reports moderate severity pain.  She characterizes the pain as cramping, tingling, and burning.  She has tried a brace.  She reports nighttime symptoms.   Back in May, the patient noticed a painful lump on the side of her wrist. Given her history of lymphoma, she consulted her oncologist, who referred her to Dr. Gaitan. She had undergone chemotherapy for her lymphoma. The lump was diagnosed as a ganglion cyst. Currently, she reports issues with her pinky finger, noting that it doesn't move as it should. Additionally, she experiences coldness in parts of her hand and nighttime tingling sensations that interrupt her sleep. While she does have some right hand numbness, it's not significantly noticeable. Left side weaker than the right side.    Interval History: Patient underwent ulnar nerve transposition as well as Guyon's tunnel and carpal tunnel release.    Interval History (4/8/24): Patient is in office for a follow-up visit after surgery on 3/2/24 with slight  soreness and mentions an uncomfortable feeling within her pinky finger. Overall, she is doing well.     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    Swelling of limb 05/20/2014    Visual impairment     GLASSES     Past Surgical History:   Procedure Laterality Date    BONE MARROW ASPIRATE &BIOPSY  05/15/2014    BONE MARROW/BLOOD-DERIVED PERIPHERAL STEM CELL TRANSPLANTAT; ALLOGENEIC DONOR LYMPHOCYTE INFUSIONS  2014    stem cell transplant    COLONOSCOPY N/A 2/18/2019    Procedure: COLONOSCOPY;  Surgeon: Larry Ladd MD;  Location: [facility] ENDOSCOPY    HERNIA SURGERY  11/1999    Umbilical hernia repair    FUAD BIOPSY STEREO NODULE 1 SITE LEFT (CPT=19081)      2015    FUAD BIOPSY STEREO NODULE 2 SITE BILAT (CPT=19081/48356)  08/19/2015    OTHER SURGICAL HISTORY      Injection Tendon Sheath, Ligament    TUBAL LIGATION  2001     Current Outpatient Medications   Medication Sig Dispense Refill    rivaroxaban (XARELTO) 10 MG Oral Tab Take 1 tablet (10 mg total) by mouth daily with food. 90 tablet 3    rosuvastatin 40 MG Oral Tab TAKE 1 TABLET BY MOUTH EVERY EVENING 90 tablet 3    levothyroxine 125 MCG Oral Tab Take 1 tablet (125 mcg total) by mouth before breakfast. 90 tablet 3    Cholecalciferol (VITAMIN D) 1000 units Oral Tab Take 1,000 Units by mouth daily.        HYDROcodone-acetaminophen (NORCO) 5-325 MG Oral Tab Take 1 tablet by mouth every 6 (six) hours as needed for  Pain. (Patient not taking: Reported on 4/8/2024) 20 tablet 0     Allergies   Allergen Reactions    Aspirin OTHER (SEE COMMENTS)     Other reaction(s): GI problems     Family History   Problem Relation Age of Onset    Diabetes Father     Heart Disease Father     Other (Other) Father     Thyroid disease Sister     Other (lupus) Sister     Other (sjogren's) Sister     Heart Disease Other         Grandparents    Other (Other) Mother         Rheumatoid Arthritis    Breast Cancer Sister 47    Other (Other) Brother         Hemochromatosis    Melanoma Maternal Grandfather     Breast Cancer Paternal Aunt 65     Social History     Occupational History    Not on file   Tobacco Use    Smoking status: Never    Smokeless tobacco: Never   Vaping Use    Vaping Use: Never used   Substance and Sexual Activity    Alcohol use: Yes     Comment: Occ, once a month    Drug use: No    Sexual activity: Yes     Partners: Male     Birth control/protection: Tubal Ligation      Review of Systems (negative unless bolded):  General: fevers, chills, fatigue  CV:  chest pain, palpitations, leg swelling  Msk: bodyaches, neck pain, neck stiffness  Skin: rashes, open wounds, nonhealing ulcers  Hem: bleeds easily, bruise easily, immunocompromised  Neuro: dizziness, light headedness, headaches  Psych: anxious, depressed, anger issues    Attention: This note has been scribed by Chantel Newton under the supervision of Willy Gaitan MD.      Willy Gaitan MD   Hand, Wrist, & Elbow Surgery  [email or web address]  t: [phone]  f: [phone]

## 2024-04-25 ENCOUNTER — PATIENT MESSAGE (OUTPATIENT)
Dept: FAMILY MEDICINE CLINIC | Facility: CLINIC | Age: 54
End: 2024-04-25

## 2024-04-25 DIAGNOSIS — R76.11 POSITIVE TB TEST: Primary | ICD-10-CM

## 2024-04-26 NOTE — TELEPHONE ENCOUNTER
Dr. Puentes - 4/29/24 Physical Scheduled  See Attached PDF Form   Patient request - complete this at appointment.       Onsite Clinical - please assist with Physical Form, attached PDF.

## 2024-04-26 NOTE — TELEPHONE ENCOUNTER
From: Daisy Hamm  To: Windy Lima  Sent: 4/25/2024 3:17 PM CDT  Subject: Request to have a form completed    I have an appointment on 4/29 and I need the attached form completed. I am hoping I can  the form at my appointment.     Please let me know if you need anything further.

## 2024-04-27 ENCOUNTER — LAB ENCOUNTER (OUTPATIENT)
Dept: LAB | Age: 54
End: 2024-04-27
Attending: PHYSICIAN ASSISTANT
Payer: COMMERCIAL

## 2024-04-27 DIAGNOSIS — E03.9 ACQUIRED HYPOTHYROIDISM: ICD-10-CM

## 2024-04-27 LAB — TSI SER-ACNC: 0.92 MIU/ML (ref 0.55–4.78)

## 2024-04-27 PROCEDURE — 36415 COLL VENOUS BLD VENIPUNCTURE: CPT

## 2024-04-27 PROCEDURE — 84443 ASSAY THYROID STIM HORMONE: CPT

## 2024-04-29 ENCOUNTER — OFFICE VISIT (OUTPATIENT)
Dept: FAMILY MEDICINE CLINIC | Facility: CLINIC | Age: 54
End: 2024-04-29
Payer: COMMERCIAL

## 2024-04-29 ENCOUNTER — LAB ENCOUNTER (OUTPATIENT)
Dept: LAB | Age: 54
End: 2024-04-29
Attending: FAMILY MEDICINE
Payer: COMMERCIAL

## 2024-04-29 VITALS
DIASTOLIC BLOOD PRESSURE: 80 MMHG | BODY MASS INDEX: 39.86 KG/M2 | HEIGHT: 66 IN | WEIGHT: 248 LBS | RESPIRATION RATE: 17 BRPM | SYSTOLIC BLOOD PRESSURE: 115 MMHG | HEART RATE: 96 BPM

## 2024-04-29 DIAGNOSIS — G47.33 OBSTRUCTIVE SLEEP APNEA SYNDROME: ICD-10-CM

## 2024-04-29 DIAGNOSIS — Z23 NEED FOR PNEUMOCOCCAL VACCINATION: ICD-10-CM

## 2024-04-29 DIAGNOSIS — R73.09 ELEVATED GLUCOSE: ICD-10-CM

## 2024-04-29 DIAGNOSIS — Z13.220 LIPID SCREENING: ICD-10-CM

## 2024-04-29 DIAGNOSIS — Z11.1 SCREENING-PULMONARY TB: ICD-10-CM

## 2024-04-29 DIAGNOSIS — Z00.00 WELLNESS EXAMINATION: ICD-10-CM

## 2024-04-29 DIAGNOSIS — Z00.00 WELLNESS EXAMINATION: Primary | ICD-10-CM

## 2024-04-29 LAB
ALBUMIN SERPL-MCNC: 4.6 G/DL (ref 3.2–4.8)
ALBUMIN/GLOB SERPL: 1.6 {RATIO} (ref 1–2)
ALP LIVER SERPL-CCNC: 88 U/L
ALT SERPL-CCNC: 33 U/L
ANION GAP SERPL CALC-SCNC: 5 MMOL/L (ref 0–18)
AST SERPL-CCNC: 31 U/L (ref ?–34)
BILIRUB SERPL-MCNC: 0.3 MG/DL (ref 0.3–1.2)
BUN BLD-MCNC: 14 MG/DL (ref 9–23)
BUN/CREAT SERPL: 17.9 (ref 10–20)
CALCIUM BLD-MCNC: 9.8 MG/DL (ref 8.7–10.4)
CHLORIDE SERPL-SCNC: 108 MMOL/L (ref 98–112)
CHOLEST SERPL-MCNC: 140 MG/DL (ref ?–200)
CO2 SERPL-SCNC: 30 MMOL/L (ref 21–32)
CREAT BLD-MCNC: 0.78 MG/DL
EGFRCR SERPLBLD CKD-EPI 2021: 89 ML/MIN/1.73M2 (ref 60–?)
EST. AVERAGE GLUCOSE BLD GHB EST-MCNC: 123 MG/DL (ref 68–126)
FASTING PATIENT LIPID ANSWER: NO
FASTING STATUS PATIENT QL REPORTED: NO
GLOBULIN PLAS-MCNC: 2.8 G/DL (ref 2.8–4.4)
GLUCOSE BLD-MCNC: 108 MG/DL (ref 70–99)
HBA1C MFR BLD: 5.9 % (ref ?–5.7)
HDLC SERPL-MCNC: 59 MG/DL (ref 40–59)
LDLC SERPL CALC-MCNC: 58 MG/DL (ref ?–100)
NONHDLC SERPL-MCNC: 81 MG/DL (ref ?–130)
OSMOLALITY SERPL CALC.SUM OF ELEC: 297 MOSM/KG (ref 275–295)
POTASSIUM SERPL-SCNC: 4.4 MMOL/L (ref 3.5–5.1)
PROT SERPL-MCNC: 7.4 G/DL (ref 5.7–8.2)
SODIUM SERPL-SCNC: 143 MMOL/L (ref 136–145)
TRIGL SERPL-MCNC: 130 MG/DL (ref 30–149)
VLDLC SERPL CALC-MCNC: 19 MG/DL (ref 0–30)

## 2024-04-29 PROCEDURE — 83036 HEMOGLOBIN GLYCOSYLATED A1C: CPT

## 2024-04-29 PROCEDURE — 80061 LIPID PANEL: CPT

## 2024-04-29 PROCEDURE — 80053 COMPREHEN METABOLIC PANEL: CPT

## 2024-04-29 PROCEDURE — 86480 TB TEST CELL IMMUN MEASURE: CPT

## 2024-04-29 PROCEDURE — 36415 COLL VENOUS BLD VENIPUNCTURE: CPT

## 2024-04-29 NOTE — PROGRESS NOTES
HPI:   Daisy Hamm is a 54 year old female who presents for an Annual Health Visit.   Patient is noted that she has been experiencing increased fatigue recently, she is having increased somnolence as well, she has prior history of a sleep apnea but was not able to tolerate CPAP, she also has noted episodes of feeling jittery after fasting or after food intake    Allergies:     Allergies   Allergen Reactions    Aspirin OTHER (SEE COMMENTS)     Other reaction(s): GI problems       CURRENT MEDICATIONS   Current Outpatient Medications   Medication Sig Dispense Refill    rivaroxaban (XARELTO) 10 MG Oral Tab Take 1 tablet (10 mg total) by mouth daily with food. 90 tablet 3    rosuvastatin 40 MG Oral Tab TAKE 1 TABLET BY MOUTH EVERY EVENING 90 tablet 3    levothyroxine 125 MCG Oral Tab Take 1 tablet (125 mcg total) by mouth before breakfast. 90 tablet 3    Cholecalciferol (VITAMIN D) 1000 units Oral Tab Take 1,000 Units by mouth daily.          HISTORICAL INFORMATION   Past Medical History:    Abnormal mammogram of left breast    Anaplastic large cell lymphoma, unspecified site, extranodal and solid organ sites    Anxiety state    Depression    DVT (deep venous thrombosis) (HCC)    High cholesterol    History of blood transfusion    Hypothyroidism    Hypothyroidism, primary    Other malignant lymphomas of intra-abdominal lymph nodes    Personal history of antineoplastic chemotherapy    Port catheter in place    Rheumatoid arthritis (HCC)    Sleep apnea    NO DEVICE USED.    Swelling of limb    Visual impairment    GLASSES      Past Surgical History:   Procedure Laterality Date    Bone marrow aspirate &biopsy  05/15/2014    Bone marrow/blood-derived peripheral stem cell transplantat; allogeneic donor lymphocyte infusions  2014    stem cell transplant    Colonoscopy N/A 2/18/2019    Procedure: COLONOSCOPY;  Surgeon: Larry Ladd MD;  Location: [facility] ENDOSCOPY    Hernia surgery  11/1999    Umbilical hernia  repair    Ofe biopsy stereo nodule 1 site left (cpt=19081)      2015    Ofe biopsy stereo nodule 2 site bilat (cpt=19081/47393)  08/19/2015    Other surgical history      Injection Tendon Sheath, Ligament    Tubal ligation  2001      Family History   Problem Relation Age of Onset    Diabetes Father     Heart Disease Father     Other (Other) Father     Thyroid disease Sister     Other (lupus) Sister     Other (sjogren's) Sister     Heart Disease Other         Grandparents    Other (Other) Mother         Rheumatoid Arthritis    Breast Cancer Sister 47    Other (Other) Brother         Hemochromatosis    Melanoma Maternal Grandfather     Breast Cancer Paternal Aunt 65      SOCIAL HISTORY   Social History     Socioeconomic History    Marital status:    Tobacco Use    Smoking status: Never    Smokeless tobacco: Never   Vaping Use    Vaping status: Never Used   Substance and Sexual Activity    Alcohol use: Yes     Comment: Occ, once a month    Drug use: No    Sexual activity: Yes     Partners: Male     Birth control/protection: Tubal Ligation   Other Topics Concern    Caffeine Concern Yes     Comment: Soda, Coffee - 2 cups daily     Social History     Social History Narrative    Not on file        REVIEW OF SYSTEMS:     Review of Systems      EXAM:   /80   Pulse 96   Resp 17   Ht 5' 6\" (1.676 m)   Wt 248 lb (112.5 kg)   LMP  (LMP Unknown)   BMI 40.03 kg/m²    Wt Readings from Last 6 Encounters:   04/29/24 248 lb (112.5 kg)   04/08/24 256 lb (116.1 kg)   03/25/24 256 lb (116.1 kg)   03/21/24 256 lb (116.1 kg)   02/27/24 247 lb (112 kg)   02/22/24 250 lb (113.4 kg)     Body mass index is 40.03 kg/m².    Physical Exam  Vitals and nursing note reviewed.   Constitutional:       Appearance: She is well-developed.   Cardiovascular:      Rate and Rhythm: Normal rate and regular rhythm.      Heart sounds: Normal heart sounds.      Comments: Left lower extremity edema  Pulmonary:      Effort: Pulmonary effort is  normal.      Breath sounds: Normal breath sounds.   Abdominal:      General: Bowel sounds are normal.      Palpations: Abdomen is soft.   Skin:     General: Skin is warm and dry.   Neurological:      Mental Status: She is alert and oriented to person, place, and time.      Deep Tendon Reflexes: Reflexes are normal and symmetric.          ASSESSMENT AND PLAN:   Daisy was seen today for physical.    Diagnoses and all orders for this visit:    Wellness examination  -     Comp Metabolic Panel (14); Future  -     Lipid Panel; Future  -     Hemoglobin A1C; Future    Screening-pulmonary TB  -     Quantiferon TB Plus; Future    Lipid screening  -     Lipid Panel; Future    Obstructive sleep apnea syndrome  -     PULMONARY - INTERNAL    Elevated glucose  -     Hemoglobin A1C; Future    Need for pneumococcal vaccination  -     PCV20 VACCINE FOR INTRAMUSCULAR USE      Form for department of children and family services completed.  I did explain to patient that these symptoms may be secondary to the elevated glucose noted last year, she was in the range of prediabetes, we discussed dietary modification with increased protein intake and fiber intake, she likely has also underlying inflammation, secondary to arthritis and osteoarthritis which could be contributing factors for her fatigue, will await results for hemoglobin A1c, would likely consider the option of adding metformin    The patient indicates understanding of these issues and agrees to the plan.    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

## 2024-04-30 RX ORDER — METFORMIN HYDROCHLORIDE 500 MG/1
500 TABLET, EXTENDED RELEASE ORAL DAILY
Qty: 90 TABLET | Refills: 0 | Status: SHIPPED | OUTPATIENT
Start: 2024-04-30

## 2024-05-01 ENCOUNTER — TELEPHONE (OUTPATIENT)
Dept: FAMILY MEDICINE CLINIC | Facility: CLINIC | Age: 54
End: 2024-05-01

## 2024-05-01 ENCOUNTER — HOSPITAL ENCOUNTER (OUTPATIENT)
Dept: GENERAL RADIOLOGY | Age: 54
Discharge: HOME | End: 2024-05-01
Attending: FAMILY MEDICINE
Payer: COMMERCIAL

## 2024-05-01 DIAGNOSIS — R76.11 POSITIVE TB TEST: ICD-10-CM

## 2024-05-01 LAB
M TB IFN-G CD4+ T-CELLS BLD-ACNC: 0.04 IU/ML
M TB TUBERC IFN-G BLD QL: POSITIVE
M TB TUBERC IGNF/MITOGEN IGNF CONTROL: >10 IU/ML
QFT TB1 AG MINUS NIL: 0.39 IU/ML
QFT TB2 AG MINUS NIL: 0.31 IU/ML

## 2024-05-01 PROCEDURE — 71046 X-RAY EXAM CHEST 2 VIEWS: CPT | Performed by: FAMILY MEDICINE

## 2024-05-01 NOTE — TELEPHONE ENCOUNTER
[facility] called with positive results for Quantiferon Gold test.      Result Notes      Component  Ref Range & Units 4/29/24  3:49 PM   Quantiferon TB NIL  IU/mL 0.04   Quantiferon TB1 minus NIL  IU/mL 0.39   Quantiferon TB2 minus NIL  IU/mL 0.31   Quantiferon TB Mitogen minus NIL  IU/mL >10.00   Quantiferon TB Result  Negative Positive Abnormal

## 2024-05-02 DIAGNOSIS — Z22.7 TB LUNG, LATENT: Primary | ICD-10-CM

## 2024-05-03 NOTE — TELEPHONE ENCOUNTER
Dr Puentes, please advise    Daisy REID Em Triage Support (supporting Windy Lima MD)19 hours ago (1:38 PM)       Thank you for completing the form … o believe the second page may have been completed incorrectly… under physical capabilities #5 yes has been checked for all of them stating I have limitations.  I am hoping that is an error

## 2024-05-08 ENCOUNTER — HOSPITAL ENCOUNTER (OUTPATIENT)
Dept: MAMMOGRAPHY | Age: 54
Discharge: HOME | End: 2024-05-08
Attending: OBSTETRICS & GYNECOLOGY
Payer: COMMERCIAL

## 2024-05-08 DIAGNOSIS — Z12.31 ENCOUNTER FOR SCREENING MAMMOGRAM FOR MALIGNANT NEOPLASM OF BREAST: ICD-10-CM

## 2024-05-08 PROCEDURE — 77067 SCR MAMMO BI INCL CAD: CPT | Performed by: OBSTETRICS & GYNECOLOGY

## 2024-05-08 PROCEDURE — 77063 BREAST TOMOSYNTHESIS BI: CPT | Performed by: OBSTETRICS & GYNECOLOGY

## 2024-05-15 ENCOUNTER — OFFICE VISIT (OUTPATIENT)
Dept: ORTHOPEDICS CLINIC | Facility: CLINIC | Age: 54
End: 2024-05-15
Payer: COMMERCIAL

## 2024-05-15 VITALS — BODY MASS INDEX: 39.86 KG/M2 | HEIGHT: 66 IN | WEIGHT: 248 LBS

## 2024-05-15 DIAGNOSIS — G56.02 CARPAL TUNNEL SYNDROME OF LEFT WRIST: ICD-10-CM

## 2024-05-15 DIAGNOSIS — G56.22 GUYON SYNDROME, LEFT: Primary | ICD-10-CM

## 2024-05-15 DIAGNOSIS — G56.22 CUBITAL TUNNEL SYNDROME ON LEFT: ICD-10-CM

## 2024-05-15 PROCEDURE — 3008F BODY MASS INDEX DOCD: CPT | Performed by: ORTHOPAEDIC SURGERY

## 2024-05-15 PROCEDURE — 99024 POSTOP FOLLOW-UP VISIT: CPT | Performed by: ORTHOPAEDIC SURGERY

## 2024-05-15 NOTE — PROGRESS NOTES
Clinic Note     Assessment/Plan:  54 year old female    Left elbow ulnar nerve transposition with Guyon's tunnel and carpal tunnel release 3/21/2024- Her postop pain is slowly improving.  Continue home exercise program.  Continue to monitor for sensory and motor recovery.  The tingling has improved which is reassuring.  Right cubital and carpal tunnel syndrome- confirmed by EMG. Surgical intervention was recommended to provide the best possible functional outcome. Left hand will be addressed before the right hand.  Patient was noted to have Wartenberg's sign on examination today.  Will add Guyon's canal release to the consent.  Scheduled Surgery: June 14, 2024  Left wrist synovial cyst- Reviewed MRI; results were normal.  Patient takes Xarelto and has been recommended to avoid NSAIDs.    Follow Up: 4 weeks    Diagnostic Studies:  EMG/NCV:   1.electrodiagnostic evidence of a bilateral demyelinating and axonal ulnar mononeuropathy at the elbow with likely cubital tunnel syndrome.  There is sensory involvement in bilateral upper extremities.  There is borderline prolonged latency with decreased conduction velocity on motor ulnar nerve conduction studies as well.  Patient has a positive Wartenberg's sign in the left hand.  There are signs of active denervation in the left FCU muscle with signs of chronic reinnervation and signs of chronic reinnervation in the left FDI muscle.  Recommend cubital tunnel brace at nighttime in the left hand and orthopedic evaluation for consideration of surgical intervention.  2.There is electrodiagnostic evidence of a BILATERAL median sensory demyelinating more than axonal mononeuropathy at the wrist consistent with carpal tunnel syndrome.  There are signs of active denervation in the left APB muscle.     Physical Exam:    Ht 5' 6\" (1.676 m)   Wt 248 lb (112.5 kg)   LMP  (LMP Unknown)   BMI 40.03 kg/m²     Constitutional: NAD. AOx3. Well-developed and Well-nourished.   Psychiatric:  Normal mood/ affect/ behavior. Judgment and thought content normal.     Bilateral upper Extremity:   Inspection: Skin Intact. No skin lesions. No gross deformity.   Palpation:  No focal areas of tenderness   Motion: Elbow: normal bilateral symmetric ext/flex  Wrist: normal bilateral symmetric ext/flex/sup/pro  Finger: full composite fist   Vascular 2+ radial pulse   Special:                       Positive elbow flexion test    Median Nerve Exam Right Left   Phdurkan neg neg   Sensation normal normal   PCBr Sensation normal normal   APB Weakness No No   Thenar Atrophy No No     Ulnar Nerve Exam Right Left   Tinels + -   EF neg neg   Hypermobility @ elbow + -   Sensation normal normal   1st ROSANNA Weakness No, (+) Wartenberg sign Yes, (+) Wartenberg sign   Hypothenar Atrophy No No     Radial Nerve Exam  Right Left   Radial Sensory normal normal     CC: Pain in left hand    HPI: This 54 year old right-hand-dominant female presents with pain in the left hand.  She reports moderate severity pain.  She characterizes the pain as cramping, tingling, and burning.  She has tried a brace.  She reports nighttime symptoms.   Back in May, the patient noticed a painful lump on the side of her wrist. Given her history of lymphoma, she consulted her oncologist, who referred her to Dr. Gaitan. She had undergone chemotherapy for her lymphoma. The lump was diagnosed as a ganglion cyst. Currently, she reports issues with her pinky finger, noting that it doesn't move as it should. Additionally, she experiences coldness in parts of her hand and nighttime tingling sensations that interrupt her sleep. While she does have some right hand numbness, it's not significantly noticeable. Left side weaker than the right side.    Interval History: Patient underwent ulnar nerve transposition as well as Guyon's tunnel and carpal tunnel release.    Interval History (4/8/24): Patient is in office for a follow-up visit after surgery on 3/2/24 with slight  soreness and mentions an uncomfortable feeling within her pinky finger. Overall, she is doing well.     Interval History (5/15/24): Patient is in office for a follow-up visit after surgery on 3/2/24. Overall, patient is doing very well with minimal concerns.    Past Medical History:    Abnormal mammogram of left breast    Anaplastic large cell lymphoma, unspecified site, extranodal and solid organ sites    Anxiety state    Depression    DVT (deep venous thrombosis) (HCC)    High cholesterol    History of blood transfusion    Hypothyroidism    Hypothyroidism, primary    Other malignant lymphomas of intra-abdominal lymph nodes    Personal history of antineoplastic chemotherapy    Port catheter in place    Rheumatoid arthritis (HCC)    Sleep apnea    NO DEVICE USED.    Swelling of limb    Visual impairment    GLASSES     Past Surgical History:   Procedure Laterality Date    Bone marrow aspirate &biopsy  05/15/2014    Bone marrow/blood-derived peripheral stem cell transplantat; allogeneic donor lymphocyte infusions  2014    stem cell transplant    Colonoscopy N/A 2/18/2019    Procedure: COLONOSCOPY;  Surgeon: Larry Ladd MD;  Location: [facility] ENDOSCOPY    Hernia surgery  11/1999    Umbilical hernia repair    Ofe biopsy stereo nodule 1 site left (cpt=19081)      2015    Ofe biopsy stereo nodule 2 site bilat (cpt=19081/18668)  08/19/2015    Other surgical history      Injection Tendon Sheath, Ligament    Tubal ligation  2001     Current Outpatient Medications   Medication Sig Dispense Refill    metFORMIN  MG Oral Tablet 24 Hr Take 1 tablet (500 mg total) by mouth daily. 90 tablet 0    rivaroxaban (XARELTO) 10 MG Oral Tab Take 1 tablet (10 mg total) by mouth daily with food. 90 tablet 3    rosuvastatin 40 MG Oral Tab TAKE 1 TABLET BY MOUTH EVERY EVENING 90 tablet 3    levothyroxine 125 MCG Oral Tab Take 1 tablet (125 mcg total) by mouth before breakfast. 90 tablet 3    Cholecalciferol (VITAMIN D) 1000 units  Oral Tab Take 1,000 Units by mouth daily.         Allergies   Allergen Reactions    Aspirin OTHER (SEE COMMENTS)     Other reaction(s): GI problems     Family History   Problem Relation Age of Onset    Diabetes Father     Heart Disease Father     Other (Other) Father     Thyroid disease Sister     Other (lupus) Sister     Other (sjogren's) Sister     Heart Disease Other         Grandparents    Other (Other) Mother         Rheumatoid Arthritis    Breast Cancer Sister 47    Other (Other) Brother         Hemochromatosis    Melanoma Maternal Grandfather     Breast Cancer Paternal Aunt 65     Social History     Occupational History    Not on file   Tobacco Use    Smoking status: Never    Smokeless tobacco: Never   Vaping Use    Vaping status: Never Used   Substance and Sexual Activity    Alcohol use: Yes     Comment: Occ, once a month    Drug use: No    Sexual activity: Yes     Partners: Male     Birth control/protection: Tubal Ligation      Review of Systems (negative unless bolded):  General: fevers, chills, fatigue  CV:  chest pain, palpitations, leg swelling  Msk: bodyaches, neck pain, neck stiffness  Skin: rashes, open wounds, nonhealing ulcers  Hem: bleeds easily, bruise easily, immunocompromised  Neuro: dizziness, light headedness, headaches  Psych: anxious, depressed, anger issues    Attention: This note has been scribed by Chantel Newton under the supervision of Willy Gaitan MD.      Willy Gaitan MD   Hand, Wrist, & Elbow Surgery  [email or web address]  t: [phone]  f: [phone]

## 2024-06-11 DIAGNOSIS — R76.12 POSITIVE QUANTIFERON-TB GOLD TEST: Primary | ICD-10-CM

## 2024-06-12 ENCOUNTER — LAB ENCOUNTER (OUTPATIENT)
Dept: LAB | Age: 54
End: 2024-06-12
Attending: INTERNAL MEDICINE
Payer: COMMERCIAL

## 2024-06-12 DIAGNOSIS — R76.12 POSITIVE QUANTIFERON-TB GOLD TEST: ICD-10-CM

## 2024-06-12 PROCEDURE — 36415 COLL VENOUS BLD VENIPUNCTURE: CPT

## 2024-06-12 PROCEDURE — 86480 TB TEST CELL IMMUN MEASURE: CPT

## 2024-06-13 ENCOUNTER — ANESTHESIA EVENT (OUTPATIENT)
Dept: SURGERY | Facility: HOSPITAL | Age: 54
End: 2024-06-13
Payer: COMMERCIAL

## 2024-06-14 ENCOUNTER — ANESTHESIA (OUTPATIENT)
Dept: SURGERY | Facility: HOSPITAL | Age: 54
End: 2024-06-14
Payer: COMMERCIAL

## 2024-06-14 ENCOUNTER — TELEPHONE (OUTPATIENT)
Dept: ORTHOPEDICS CLINIC | Facility: CLINIC | Age: 54
End: 2024-06-14

## 2024-06-14 ENCOUNTER — HOSPITAL ENCOUNTER (OUTPATIENT)
Facility: HOSPITAL | Age: 54
Setting detail: HOSPITAL OUTPATIENT SURGERY
Discharge: HOME | End: 2024-06-14
Attending: ORTHOPAEDIC SURGERY | Admitting: ORTHOPAEDIC SURGERY

## 2024-06-14 VITALS
RESPIRATION RATE: 16 BRPM | HEIGHT: 66 IN | DIASTOLIC BLOOD PRESSURE: 75 MMHG | BODY MASS INDEX: 39.53 KG/M2 | HEART RATE: 62 BPM | OXYGEN SATURATION: 97 % | WEIGHT: 246 LBS | SYSTOLIC BLOOD PRESSURE: 109 MMHG | TEMPERATURE: 97 F

## 2024-06-14 LAB
GLUCOSE BLD-MCNC: 100 MG/DL (ref 70–99)
M TB IFN-G CD4+ T-CELLS BLD-ACNC: 0.04 IU/ML
M TB TUBERC IFN-G BLD QL: POSITIVE
M TB TUBERC IGNF/MITOGEN IGNF CONTROL: 6.27 IU/ML
QFT TB1 AG MINUS NIL: 0.4 IU/ML
QFT TB2 AG MINUS NIL: 0.43 IU/ML

## 2024-06-14 PROCEDURE — 76942 ECHO GUIDE FOR BIOPSY: CPT | Performed by: ANESTHESIOLOGY

## 2024-06-14 PROCEDURE — 82962 GLUCOSE BLOOD TEST: CPT

## 2024-06-14 PROCEDURE — 01N40ZZ RELEASE ULNAR NERVE, OPEN APPROACH: ICD-10-PCS | Performed by: ORTHOPAEDIC SURGERY

## 2024-06-14 PROCEDURE — 01N50ZZ RELEASE MEDIAN NERVE, OPEN APPROACH: ICD-10-PCS | Performed by: ORTHOPAEDIC SURGERY

## 2024-06-14 RX ORDER — HYDROCODONE BITARTRATE AND ACETAMINOPHEN 5; 325 MG/1; MG/1
1 TABLET ORAL ONCE AS NEEDED
Status: DISCONTINUED | OUTPATIENT
Start: 2024-06-14 | End: 2024-06-14

## 2024-06-14 RX ORDER — HYDROMORPHONE HYDROCHLORIDE 1 MG/ML
0.2 INJECTION, SOLUTION INTRAMUSCULAR; INTRAVENOUS; SUBCUTANEOUS EVERY 5 MIN PRN
Status: DISCONTINUED | OUTPATIENT
Start: 2024-06-14 | End: 2024-06-14

## 2024-06-14 RX ORDER — SODIUM CHLORIDE, SODIUM LACTATE, POTASSIUM CHLORIDE, CALCIUM CHLORIDE 600; 310; 30; 20 MG/100ML; MG/100ML; MG/100ML; MG/100ML
INJECTION, SOLUTION INTRAVENOUS CONTINUOUS
Status: DISCONTINUED | OUTPATIENT
Start: 2024-06-14 | End: 2024-06-14

## 2024-06-14 RX ORDER — ONDANSETRON 2 MG/ML
4 INJECTION INTRAMUSCULAR; INTRAVENOUS EVERY 6 HOURS PRN
Status: DISCONTINUED | OUTPATIENT
Start: 2024-06-14 | End: 2024-06-14

## 2024-06-14 RX ORDER — DEXTROSE MONOHYDRATE 25 G/50ML
50 INJECTION, SOLUTION INTRAVENOUS
Status: DISCONTINUED | OUTPATIENT
Start: 2024-06-14 | End: 2024-06-14 | Stop reason: HOSPADM

## 2024-06-14 RX ORDER — NALOXONE HYDROCHLORIDE 0.4 MG/ML
0.08 INJECTION, SOLUTION INTRAMUSCULAR; INTRAVENOUS; SUBCUTANEOUS AS NEEDED
Status: DISCONTINUED | OUTPATIENT
Start: 2024-06-14 | End: 2024-06-14

## 2024-06-14 RX ORDER — NICOTINE POLACRILEX 4 MG
15 LOZENGE BUCCAL
Status: DISCONTINUED | OUTPATIENT
Start: 2024-06-14 | End: 2024-06-14 | Stop reason: HOSPADM

## 2024-06-14 RX ORDER — ACETAMINOPHEN 500 MG
1000 TABLET ORAL ONCE
Status: DISCONTINUED | OUTPATIENT
Start: 2024-06-14 | End: 2024-06-14 | Stop reason: HOSPADM

## 2024-06-14 RX ORDER — SCOLOPAMINE TRANSDERMAL SYSTEM 1 MG/1
1 PATCH, EXTENDED RELEASE TRANSDERMAL ONCE
Status: DISCONTINUED | OUTPATIENT
Start: 2024-06-14 | End: 2024-06-14 | Stop reason: HOSPADM

## 2024-06-14 RX ORDER — ACETAMINOPHEN 500 MG
1000 TABLET ORAL ONCE AS NEEDED
Status: DISCONTINUED | OUTPATIENT
Start: 2024-06-14 | End: 2024-06-14

## 2024-06-14 RX ORDER — HYDROMORPHONE HYDROCHLORIDE 1 MG/ML
0.6 INJECTION, SOLUTION INTRAMUSCULAR; INTRAVENOUS; SUBCUTANEOUS EVERY 5 MIN PRN
Status: DISCONTINUED | OUTPATIENT
Start: 2024-06-14 | End: 2024-06-14

## 2024-06-14 RX ORDER — HYDROMORPHONE HYDROCHLORIDE 1 MG/ML
0.4 INJECTION, SOLUTION INTRAMUSCULAR; INTRAVENOUS; SUBCUTANEOUS EVERY 5 MIN PRN
Status: DISCONTINUED | OUTPATIENT
Start: 2024-06-14 | End: 2024-06-14

## 2024-06-14 RX ORDER — PROCHLORPERAZINE EDISYLATE 5 MG/ML
5 INJECTION INTRAMUSCULAR; INTRAVENOUS EVERY 8 HOURS PRN
Status: DISCONTINUED | OUTPATIENT
Start: 2024-06-14 | End: 2024-06-14

## 2024-06-14 RX ORDER — NICOTINE POLACRILEX 4 MG
30 LOZENGE BUCCAL
Status: DISCONTINUED | OUTPATIENT
Start: 2024-06-14 | End: 2024-06-14 | Stop reason: HOSPADM

## 2024-06-14 RX ORDER — HYDROCODONE BITARTRATE AND ACETAMINOPHEN 5; 325 MG/1; MG/1
2 TABLET ORAL ONCE AS NEEDED
Status: DISCONTINUED | OUTPATIENT
Start: 2024-06-14 | End: 2024-06-14

## 2024-06-14 RX ORDER — MIDAZOLAM HYDROCHLORIDE 1 MG/ML
INJECTION INTRAMUSCULAR; INTRAVENOUS AS NEEDED
Status: DISCONTINUED | OUTPATIENT
Start: 2024-06-14 | End: 2024-06-14 | Stop reason: SURG

## 2024-06-14 RX ADMIN — MIDAZOLAM HYDROCHLORIDE 2 MG: 1 INJECTION INTRAMUSCULAR; INTRAVENOUS at 07:02:00

## 2024-06-14 NOTE — H&P
Clinic Note       Assessment/Plan:  54 year old female    Left elbow ulnar nerve transposition with Guyon's tunnel and carpal tunnel release 3/21/2024- Her postop pain is slowly improving.  Continue home exercise program.  Continue to monitor for sensory and motor recovery.  The tingling has improved which is reassuring.  Right cubital and carpal tunnel syndrome- confirmed by EMG. Surgical intervention was recommended to provide the best possible functional outcome. Left hand will be addressed before the right hand.  Patient was noted to have Wartenberg's sign on examination today.  Will add Guyon's canal release to the consent.  Scheduled Surgery: June 14, 2024  Left wrist synovial cyst- Reviewed MRI; results were normal.  Patient takes Xarelto and has been recommended to avoid NSAIDs.    Follow Up: 4 weeks    Diagnostic Studies:  EMG/NCV:   1.electrodiagnostic evidence of a bilateral demyelinating and axonal ulnar mononeuropathy at the elbow with likely cubital tunnel syndrome.  There is sensory involvement in bilateral upper extremities.  There is borderline prolonged latency with decreased conduction velocity on motor ulnar nerve conduction studies as well.  Patient has a positive Wartenberg's sign in the left hand.  There are signs of active denervation in the left FCU muscle with signs of chronic reinnervation and signs of chronic reinnervation in the left FDI muscle.  Recommend cubital tunnel brace at nighttime in the left hand and orthopedic evaluation for consideration of surgical intervention.  2.There is electrodiagnostic evidence of a BILATERAL median sensory demyelinating more than axonal mononeuropathy at the wrist consistent with carpal tunnel syndrome.  There are signs of active denervation in the left APB muscle.     Physical Exam:    /78 (BP Location: Left arm)   Pulse 72   Temp 98.1 °F (36.7 °C) (Temporal)   Resp 16   Ht 5' 6\" (1.676 m)   Wt 246 lb (111.6 kg)   LMP  (LMP Unknown)    SpO2 98%   BMI 39.71 kg/m²     Constitutional: NAD. AOx3. Well-developed and Well-nourished.   Psychiatric: Normal mood/ affect/ behavior. Judgment and thought content normal.     Bilateral upper Extremity:   Inspection: Skin Intact. No skin lesions. No gross deformity.   Palpation:  No focal areas of tenderness   Motion: Elbow: normal bilateral symmetric ext/flex  Wrist: normal bilateral symmetric ext/flex/sup/pro  Finger: full composite fist   Vascular 2+ radial pulse   Special:                       Positive elbow flexion test    Median Nerve Exam Right Left   Phdurkan neg neg   Sensation normal normal   PCBr Sensation normal normal   APB Weakness No No   Thenar Atrophy No No     Ulnar Nerve Exam Right Left   Tinels + -   EF neg neg   Hypermobility @ elbow + -   Sensation normal normal   1st ROSANNA Weakness No, (+) Wartenberg sign Yes, (+) Wartenberg sign   Hypothenar Atrophy No No     Radial Nerve Exam  Right Left   Radial Sensory normal normal     CC: Pain in left hand    HPI: This 54 year old right-hand-dominant female presents with pain in the left hand.  She reports moderate severity pain.  She characterizes the pain as cramping, tingling, and burning.  She has tried a brace.  She reports nighttime symptoms.   Back in May, the patient noticed a painful lump on the side of her wrist. Given her history of lymphoma, she consulted her oncologist, who referred her to Dr. Gaitan. She had undergone chemotherapy for her lymphoma. The lump was diagnosed as a ganglion cyst. Currently, she reports issues with her pinky finger, noting that it doesn't move as it should. Additionally, she experiences coldness in parts of her hand and nighttime tingling sensations that interrupt her sleep. While she does have some right hand numbness, it's not significantly noticeable. Left side weaker than the right side.    Interval History: Patient underwent ulnar nerve transposition as well as Guyon's tunnel and carpal tunnel  release.    Interval History (4/8/24): Patient is in office for a follow-up visit after surgery on 3/2/24 with slight soreness and mentions an uncomfortable feeling within her pinky finger. Overall, she is doing well.     Past Medical History:    Abnormal mammogram of left breast    Anaplastic large cell lymphoma, unspecified site, extranodal and solid organ sites    Anxiety state    Depression    DVT (deep venous thrombosis) (HCC)    High cholesterol    History of blood transfusion    Hypothyroidism    Hypothyroidism, primary    Other malignant lymphomas of intra-abdominal lymph nodes    Personal history of antineoplastic chemotherapy    Port catheter in place    Rheumatoid arthritis (HCC)    Sleep apnea    NO DEVICE USED.    Swelling of limb    Visual impairment    GLASSES     Past Surgical History:   Procedure Laterality Date    Bone marrow aspirate &biopsy  05/15/2014    Bone marrow/blood-derived peripheral stem cell transplantat; allogeneic donor lymphocyte infusions  2014    stem cell transplant    Colonoscopy N/A 2/18/2019    Procedure: COLONOSCOPY;  Surgeon: Larry Ladd MD;  Location: [facility] ENDOSCOPY    Hernia surgery  11/1999    Umbilical hernia repair    Ofe biopsy stereo nodule 1 site left (cpt=19081)      2015    Ofe biopsy stereo nodule 2 site bilat (cpt=19081/29037)  08/19/2015    Other surgical history      Injection Tendon Sheath, Ligament    Tubal ligation  2001     No current outpatient medications on file.     Allergies   Allergen Reactions    Aspirin OTHER (SEE COMMENTS)     Other reaction(s): GI problems     Family History   Problem Relation Age of Onset    Diabetes Father     Heart Disease Father     Other (Other) Father     Thyroid disease Sister     Other (lupus) Sister     Other (sjogren's) Sister     Heart Disease Other         Grandparents    Other (Other) Mother         Rheumatoid Arthritis    Breast Cancer Sister 47    Other (Other) Brother         Hemochromatosis    Melanoma  Maternal Grandfather     Breast Cancer Paternal Aunt 65     Social History     Occupational History    Not on file   Tobacco Use    Smoking status: Never    Smokeless tobacco: Never   Vaping Use    Vaping status: Never Used   Substance and Sexual Activity    Alcohol use: Yes     Comment: Occ, once a month    Drug use: No    Sexual activity: Yes     Partners: Male     Birth control/protection: Tubal Ligation      Review of Systems (negative unless bolded):  General: fevers, chills, fatigue  CV:  chest pain, palpitations, leg swelling  Msk: bodyaches, neck pain, neck stiffness  Skin: rashes, open wounds, nonhealing ulcers  Hem: bleeds easily, bruise easily, immunocompromised  Neuro: dizziness, light headedness, headaches  Psych: anxious, depressed, anger issues    Attention: This note has been scribed by Chantel Newton under the supervision of Willy Gaitan MD.      Willy Gaitan MD   Hand, Wrist, & Elbow Surgery  [email or web address]  t: [phone]  f: [phone]

## 2024-06-14 NOTE — ANESTHESIA PREPROCEDURE EVALUATION
PRE-OP EVALUATION    Patient Name: Daisy Hamm    Admit Diagnosis: Carpal tunnel syndrome of left wrist [G56.02]  Cubital tunnel syndrome on left [G56.22]    Pre-op Diagnosis: Carpal tunnel syndrome of left wrist [G56.02]  Cubital tunnel syndrome on left [G56.22]    RIGHT CARPAL TUNNEL RELEASE AND RIGHT GUYONS CANAL RELEASE AND ULNAR NERVE TRANSPOSITION    Anesthesia Procedure: RIGHT CARPAL TUNNEL RELEASE AND RIGHT GUYONS CANAL RELEASE AND ULNAR NERVE TRANSPOSITION (Right: Lower Arm)    Surgeons and Role:     * Willy Gaitan MD - Primary    Pre-op vitals reviewed.  Temp: 97.2 °F (36.2 °C)  Pulse: 65  Resp: 16  BP: 115/73  SpO2: 96 %  Body mass index is 39.71 kg/m².    Current medications reviewed.  Hospital Medications:   lactated ringers infusion   Intravenous Continuous    [COMPLETED] ceFAZolin (Ancef) 2g in 10mL IV syringe premix  2 g Intravenous Once    lactated ringers infusion   Intravenous Continuous    lactated ringers IV bolus 500 mL  500 mL Intravenous Once PRN    atropine 0.1 MG/ML injection 0.5 mg  0.5 mg Intravenous PRN    naloxone (Narcan) 0.4 MG/ML injection 0.08 mg  0.08 mg Intravenous PRN    fentaNYL (Sublimaze) 50 mcg/mL injection 25 mcg  25 mcg Intravenous Q5 Min PRN    Or    fentaNYL (Sublimaze) 50 mcg/mL injection 50 mcg  50 mcg Intravenous Q5 Min PRN    HYDROmorphone (Dilaudid) 1 MG/ML injection 0.2 mg  0.2 mg Intravenous Q5 Min PRN    Or    HYDROmorphone (Dilaudid) 1 MG/ML injection 0.4 mg  0.4 mg Intravenous Q5 Min PRN    Or    HYDROmorphone (Dilaudid) 1 MG/ML injection 0.6 mg  0.6 mg Intravenous Q5 Min PRN    acetaminophen (Tylenol Extra Strength) tab 1,000 mg  1,000 mg Oral Once PRN    Or    HYDROcodone-acetaminophen (Norco) 5-325 MG per tab 1 tablet  1 tablet Oral Once PRN    Or    HYDROcodone-acetaminophen (Norco) 5-325 MG per tab 2 tablet  2 tablet Oral Once PRN    ondansetron (Zofran) 4 MG/2ML injection 4 mg  4 mg Intravenous Q6H PRN    prochlorperazine (Compazine) 10 MG/2ML  injection 5 mg  5 mg Intravenous Q8H PRN    [COMPLETED] ceFAZolin (Ancef) 2 g in 20mL IV syringe premix  2 g Intravenous Once    [COMPLETED] acetaminophen (Tylenol Extra Strength) tab 1,000 mg  1,000 mg Oral Once PRN    Or    [COMPLETED] HYDROcodone-acetaminophen (Norco) 5-325 MG per tab 1 tablet  1 tablet Oral Once PRN    Or    [COMPLETED] HYDROcodone-acetaminophen (Norco) 5-325 MG per tab 2 tablet  2 tablet Oral Once PRN       Outpatient Medications:     Medications Prior to Admission   Medication Sig Dispense Refill Last Dose    metFORMIN  MG Oral Tablet 24 Hr Take 1 tablet (500 mg total) by mouth daily. 90 tablet 0 6/13/2024    rivaroxaban (XARELTO) 10 MG Oral Tab Take 1 tablet (10 mg total) by mouth daily with food. 90 tablet 3 6/11/2024    rosuvastatin 40 MG Oral Tab TAKE 1 TABLET BY MOUTH EVERY EVENING 90 tablet 3 6/13/2024    levothyroxine 125 MCG Oral Tab Take 1 tablet (125 mcg total) by mouth before breakfast. 90 tablet 3 6/13/2024    Cholecalciferol (VITAMIN D) 1000 units Oral Tab Take 1,000 Units by mouth daily.     Past Week       Allergies: Aspirin      Anesthesia Evaluation        Anesthetic Complications           GI/Hepatic/Renal                                 Cardiovascular    Negative cardiovascular ROS.    Exercise tolerance: good     MET: >4    (+) obesity                                       Endo/Other                                  Pulmonary                    (+) sleep apnea and noncompliant      Neuro/Psych                                      Past Surgical History:   Procedure Laterality Date    Bone marrow aspirate &biopsy  05/15/2014    Bone marrow/blood-derived peripheral stem cell transplantat; allogeneic donor lymphocyte infusions  2014    stem cell transplant    Colonoscopy N/A 2/18/2019    Procedure: COLONOSCOPY;  Surgeon: Larry Ladd MD;  Location: [facility] ENDOSCOPY    Hernia surgery  11/1999    Umbilical hernia repair    Ofe biopsy stereo nodule 1 site left  (cpt=19081)      2015    Ofe biopsy stereo nodule 2 site bilat (cpt=19081/67833)  08/19/2015    Other surgical history      Injection Tendon Sheath, Ligament    Tubal ligation  2001     Social History     Socioeconomic History    Marital status:    Tobacco Use    Smoking status: Never    Smokeless tobacco: Never   Vaping Use    Vaping status: Never Used   Substance and Sexual Activity    Alcohol use: Yes     Comment: Occ, once a month    Drug use: No    Sexual activity: Yes     Partners: Male     Birth control/protection: Tubal Ligation   Other Topics Concern    Caffeine Concern Yes     Comment: Soda, Coffee - 2 cups daily     History   Drug Use No     Available pre-op labs reviewed.     Lab Results   Component Value Date     04/29/2024    K 4.4 04/29/2024     04/29/2024    CO2 30.0 04/29/2024    BUN 14 04/29/2024    CREATSERUM 0.78 04/29/2024     (H) 04/29/2024    CA 9.8 04/29/2024            Airway      Mallampati: II  Mouth opening: 3 FB  TM distance: 4 - 6 cm  Neck ROM: full Cardiovascular    Cardiovascular exam normal.         Dental    Dentition appears grossly intact         Pulmonary    Pulmonary exam normal.                 Other findings              ASA: 3   Plan: MAC                             Present on Admission:  **None**

## 2024-06-14 NOTE — ADDENDUM NOTE
Addendum  created 06/14/24 1051 by Mark Arango MD    Child order released for a procedure order, Clinical Note Signed, Intraprocedure Blocks edited, SmartForm saved

## 2024-06-14 NOTE — OPERATIVE REPORT
Operative Report     Patient Name: Daisy Hamm    6/14/2024    Preoperative Diagnosis:     Carpal tunnel syndrome of right wrist [G56.01]  Cubital tunnel syndrome on right [G56.21]    Postoperative Diagnosis:     Carpal tunnel syndrome of right wrist [G56.01]  Cubital tunnel syndrome on right [G56.21]    Surgeons and Role:     * Willy Gaitan MD - Primary     Assistant: PA: Mae King PA    A SA/PA was needed for the successful completion of this case. They were essential for the proper positioning of patient, manipulation of instruments, proper exposure, manipulation of soft tissue and/or fracture fragments, and wound closure.     Procedures:     Decompression of right ulnar nerve at elbow with transmuscular transposition (CPT 20655)  Right elbow flexor pronator lengthening (CPT 89970)  Decompression of right ulnar nerve in guyon's canal (CPT 93067)   Decompression of right ulnar motor nerve branch (CPT 00247)   Decompression of right median nerve in carpal tunnel (CPT 97630)     Antibiotics: Ancef 2g    Surgical Findings: subtle hourglass deformity of ulnar nerve, large branch of MABC proximal to medial epicondyle, FCU branches distal to medial epicondyle    Anesthesia: MAC + Regional    Complications: None    Condition: Stable    Estimated Blood Loss: 15 mL    Indications:  54 year old female with carpal and cubital tunnel syndrome that failed non-surgical management. The risks associated with the procedure, expected  outcome, the expected time to recovery, and the rehab required were reviewed. All of the patient's questions were answered.        Procedure:  Patient was met in the preoperative holding area where consent was verified, laterality was marked with the surgeon's initials, and the H&P was updated. Patient was brought to the operating room on a transport cart.  Patient was transferred from the transport cart onto the operating room table and placed in a supine position.  Antibiotics  were fully infused. The arm was prepped and draped in the usual sterile fashion. An sterile upper arm tourniquet was placed.   A surgical timeout was performed.  The limb was elevated and exsanguinated using Esmarch bandage.  Tourniquet was raised to 250 mmHg.  15 blade was used to make an incision through the dermis extending it in a Shaniqua type fashion proximal to the proximal wrist crease.  Adson's and White scissors were used to dissect through subcutaneous tissue onto the antebrachial fascia and volar carpal ligament.  Small crossing blood vessels were cauterized with bipolar cautery.  A small cutaneous branch was identified in the distal aspect of the incision was protected for the remainder of the case.  The ulnar artery and vein were identified and they stick antebrachial fascia proximally was divided.  Once the ulnar neurovascular bundle was decompressed proximally and distally, it was gently retracted medially.  The hook of the hamate was identified. The proximal tendinous edge of the hypothenar fascia was identified. It was released along ulnar border of hook of hamate.  This allowed visualization of the deep motor branch of the ulnar nerve.  The remaining fascia and hypothenar muscle fibers were divided allowing complete decompression of the deep motor branch.  A 15 blade was used to make an incision radial to hook of hamate over the carpal tunnel in line with the radial border of ring finger. The carpal tunnel was divided longitudinally along the entirety of its length.  A tight carpal tunnel was noted.    The soft tissue was dissected off the proximal edge of transverse carpal ligament and antebrachial fascia superficial and deep to it using White scissors.  15 blade was used to divide the distal antebrachial fascia.  A 15 blade was used to make an incision through the dermis.  Adson White scissors were used to dissect through the subcutaneous tissue using bipolar cautery to cauterize any small  crossing vessels.  Medial antebrachial cutaneous nerve was found roughly 3-1/2 cm distal to the medial epicondyle.  This cutaneous nerve was then protected for the remainder of the case.  The dissection was meticulously carried down to the Phelps's fascia.  The ulnar nerve was found just proximal to Phelps's fascia along the medial aspect of the triceps.  Phelps's fascia was divided and the decompression was carried distally to the 2 FCU heads.  The fascia overlying the FCU muscle was carefully  off of the FCU muscle underneath and then divided.  Motor branches from the ulnar nerve to the FCU muscle were visualized and protected.  The FCU muscle belly was carefully spread to decompress the nerve distally.  Bipolar cautery was used to create a full-thickness anterior flap in order to identify the flexor pronator muscle/fascia.  The intermuscular septum was then identified proximally and excised.  2 long fascial flaps were made from the flexor pronator fascia 1 proximally based more distally based.  The fascial flap was sharply elevated off of the muscle.  The deep flexor pronator aponeurosis was identified and the muscle overlying it was released from the proximal to distal fashion to maintain its innervation.  The fascia was then sharply elevated off the muscle underneath it preserving the muscle fibers.  Fascial elements anterior to the FCU (medial epicondyle head) was identified and excised.  The FCU muscle was released off of the epicondyle from a proximal to distal fashion to maintain muscle innervation.  The soft tissue attachments of the ulnar nerve were circumferentially released to allow total mobilization of the ulnar nerve.  Neurolysis of the posterior branch of the ulnar nerve going to the FCU muscle was performed in order to facilitate transposition.  Once the ulnar nerve was transposed anterior to the medial epicondyle, the fascial flaps were repaired using 0 Vicryl.  The ulnar nerve was  traced proximal to distal evaluating for any kink points/compression points.  No further points of compression or kinking was noted.  The elbow was taken through range of motion and no nerve subluxation was noted and no compression of the nerve was noted.  The tourniquet was let down and hemostasis was achieved with pressure and bipolar cautery.  Once hemostasis was achieved, the wound was irrigated with sterile saline prior to closure. A 10 Fr drain was placed in the wound bed exiting out a separate distal stab incision. It was suture in placed with 3-0 nylon suture.    Closure:  3-0 Monocryl was used to reapproximate the skin edges.  4-0 Monocryl was run in a subcuticular fashion.  Skin glue and Steri-Strips were applied    Dressing/Splint:  Tegaderm with a nonadherent pad was applied to the incision.  Webril was applied and held in place with an Ace wrap.  The patient's arm was then placed in a sling.    Post Operative:  Patient was woken up from anesthesia and taken to PACU for further recovery and discharge home.    Willy Gaitan MD   Hand, Wrist, & Elbow Surgery  [email or web address]  t: [phone]  f: [phone]

## 2024-06-14 NOTE — DISCHARGE INSTRUCTIONS
What to expect after a cubital/carpal tunnel release     Immediately after your surgery, keep your hand elevated (fingers up pointing to ceiling) as much as possible for the first 7 days. Icing is good too, but not as important as elevation. It is normal for your fingers to swell and have discoloration (bruising) appear a couple days after surgery into your fingers or elbow. You should begin using your hand for light activities - writing, typing, dressing and feeding yourself immediately. You can start to move the elbow right away. Refrain from lifting greater than 2 pounds for the first 2 weeks after your surgery to allow the surgical site to heal completely.    Patient’s pain level is variable after this procedure most patients average a 2-3 out of 10 on a pain scale.. After the numbness wears off you can take pain medication as needed, including an anti-inflammatory (i.e. Aleve, Motrin, ibuprofen) as long as you don’t have any contraindications to taking these.    Keep the clear Tegaderm dressing on for 5 days. You can get the clear dressing wet right away when washing hands or showering.  Keep the ace bandage on to help with swelling, but you can take it off for showering but put back on after. You can continue to let it get wet after the clear dressing is removed around day 5. There are steri strips underneath that can get wet and will fall off on their own. Please refrain from soaking your arm in a bath for 3 weeks following your surgery. You only need to use the sling until the block wears off and you can control your arm again.     You will see Mae King PA-C or Dr Gaitan at your post op visit approximately 10-14 days after surgery.  An appointment was made for you but if you do not have an appointment or that time does not work please call the office     If you have any questions or concerns please reach out. Our office number is: [phone].       Drain: Check to make sure bulb is compressed down to  maintain suction. When bulb is full, empty and record amount of fluid emptied.       HOW TO EMPTY YOUR DRAIN    Wash hands with soap and water  Hold drain so plug is upright and release it  Turn drain upside down into measuring container and squeeze drain until all the liquid is removed  While squeezing the drain, replace the plug into drain  Measure the liquid and record the amount in your diary twice a day  Be sure to bring your diary to your next visit with the doctor          Drain Record Sheet    Date Time Drain #1 Drain #2

## 2024-06-14 NOTE — ANESTHESIA POSTPROCEDURE EVALUATION
[facility]    Daisy Hamm Patient Status:  Hospital Outpatient Surgery   Age/Gender 54 year old female MRN [id number]   Location [facility] PERIOPERATIVE SERVICE Attending Willy Gaitan MD   Hosp Day # 0 PCP Windy Lima MD       Anesthesia Post-op Note    RIGHT CARPAL TUNNEL RELEASE AND RIGHT GUYONS CANAL RELEASE AND ULNAR NERVE TRANSPOSITION    Procedure Summary       Date: 06/14/24 Room / Location:  MAIN OR 06 /  MAIN OR    Anesthesia Start: 0702 Anesthesia Stop: 0916    Procedure: RIGHT CARPAL TUNNEL RELEASE AND RIGHT GUYONS CANAL RELEASE AND ULNAR NERVE TRANSPOSITION (Right: Lower Arm) Diagnosis:       Carpal tunnel syndrome of left wrist      Cubital tunnel syndrome on left      (Carpal tunnel syndrome of left wrist [G56.02]Cubital tunnel syndrome on left [G56.22])    Surgeons: Willy Gaitan MD Anesthesiologist: Mark Arango MD    Anesthesia Type: MAC ASA Status: Not recorded            Anesthesia Type: MAC    Vitals Value Taken Time   /73 06/14/24 0903   Temp 97.2 °F (36.2 °C) 06/14/24 0856   Pulse 68 06/14/24 0916   Resp 16 06/14/24 0856   SpO2 99 % 06/14/24 0916   Vitals shown include unfiled device data.    Patient Location: Same Day Surgery    Anesthesia Type: MAC    Airway Patency: patent    Postop Pain Control: adequate    Mental Status: preanesthetic baseline    Nausea/Vomiting: none    Cardiopulmonary/Hydration status: stable euvolemic    Complications: no apparent anesthesia related complications    Postop vital signs: stable    Dental Exam: Unchanged from Preop    Patient to be discharged home when criteria met.

## 2024-06-14 NOTE — ANESTHESIA PROCEDURE NOTES
Regional Block    Performed by: Mark Arango MD  Authorized by: Mark Arango MD      General Information and Staff    Start Time:   Anesthesiologist:  Mark Arango MD  Performed by:  Anesthesiologist  Patient Location:  OR    Block Placement: Pre Induction  Site Identification: real time ultrasound guided, nerve stimulator and image stored and retrievable    Block site/laterality marked before start: site marked  Reason for Block: at surgeon's request and post-op pain management    Preanesthetic Checklist: 2 patient identifers, IV checked, risks and benefits discussed, monitors and equipment checked, pre-op evaluation, timeout performed, anesthesia consent, sterile technique used, no prohibitive neurological deficits and no local skin infection at insertion site      Procedure Details    Patient Position:  Sitting  Prep: ChloraPrep    Monitoring:  Cardiac monitor, continuous pulse ox and blood pressure cuff  Block Type:  Supraclavicular  Laterality:  Right  Injection Technique:  Single-shot    Needle    Needle Type:  Short-bevel and echogenic  Needle Gauge:  21 G  Needle Length:  50 mm  Needle Localization:  Ultrasound guidance  Reason for Ultrasound Use: appropriate spread of the medication was noted in real time and no ultrasound evidence of intravascular and/or intraneural injection            Assessment    Injection Assessment:  Good spread noted, negative resistance, negative aspiration for heme, incremental injection, low pressure, local visualized surrounding nerve on ultrasound and no pain on injection  Heart Rate Change: No    - Patient tolerated block procedure well without evidence of immediate block related complications.     Medications      Additional Comments    Medication:  Ropivacaine 0.5% 25mL with 1:200,000 epi

## 2024-06-17 ENCOUNTER — OFFICE VISIT (OUTPATIENT)
Dept: ORTHOPEDICS CLINIC | Facility: CLINIC | Age: 54
End: 2024-06-17
Payer: COMMERCIAL

## 2024-06-17 VITALS — BODY MASS INDEX: 39.53 KG/M2 | HEIGHT: 66 IN | WEIGHT: 246 LBS

## 2024-06-17 DIAGNOSIS — G56.22 CUBITAL TUNNEL SYNDROME ON LEFT: ICD-10-CM

## 2024-06-17 DIAGNOSIS — G56.02 CARPAL TUNNEL SYNDROME OF LEFT WRIST: ICD-10-CM

## 2024-06-17 DIAGNOSIS — G56.22 GUYON SYNDROME, LEFT: Primary | ICD-10-CM

## 2024-06-17 PROCEDURE — 3008F BODY MASS INDEX DOCD: CPT | Performed by: ORTHOPAEDIC SURGERY

## 2024-06-17 PROCEDURE — 99024 POSTOP FOLLOW-UP VISIT: CPT | Performed by: ORTHOPAEDIC SURGERY

## 2024-06-17 NOTE — TELEPHONE ENCOUNTER
LMOM letting patient know she is scheduled with Mae King on 6/28/24 at [facility] at 7:40 AM for post op.

## 2024-06-17 NOTE — PROGRESS NOTES
Clinic Note     Assessment/Plan:  54 year old female    Left elbow ulnar nerve transposition with Guyon's tunnel and carpal tunnel release 3/21/2024- Her postop pain is slowly improving.  Continue home exercise program.  Continue to monitor for sensory and motor recovery.  The tingling has improved which is reassuring.  Status post left ulnar trans muscular transposition with Guyon's canal release and carpal tunnel release on 6/14/2024 right cubital and carpal tunnel syndrome-drain was removed and a Telfa Tegaderm dressing was applied.  Resolution Wartenberg's is noted.  Continue to follow for sensory and motor recovery.  Follow-up in 10 days for suture removal  Left wrist synovial cyst- Reviewed MRI; results were normal.    Follow Up: 10 days for suture removal    Diagnostic Studies:  EMG/NCV:   1.electrodiagnostic evidence of a bilateral demyelinating and axonal ulnar mononeuropathy at the elbow with likely cubital tunnel syndrome.  There is sensory involvement in bilateral upper extremities.  There is borderline prolonged latency with decreased conduction velocity on motor ulnar nerve conduction studies as well.  Patient has a positive Wartenberg's sign in the left hand.  There are signs of active denervation in the left FCU muscle with signs of chronic reinnervation and signs of chronic reinnervation in the left FDI muscle.  Recommend cubital tunnel brace at nighttime in the left hand and orthopedic evaluation for consideration of surgical intervention.  2.There is electrodiagnostic evidence of a BILATERAL median sensory demyelinating more than axonal mononeuropathy at the wrist consistent with carpal tunnel syndrome.  There are signs of active denervation in the left APB muscle.     Physical Exam:    Ht 5' 6\" (1.676 m)   Wt 246 lb (111.6 kg)   LMP  (LMP Unknown)   BMI 39.71 kg/m²     Constitutional: NAD. AOx3. Well-developed and Well-nourished.   Psychiatric: Normal mood/ affect/ behavior. Judgment and  thought content normal.     Bilateral upper Extremity:   Inspection: Skin Intact. No skin lesions. No gross deformity.   Palpation:  No focal areas of tenderness   Motion: Elbow: normal bilateral symmetric ext/flex  Wrist: normal bilateral symmetric ext/flex/sup/pro  Finger: full composite fist   Vascular 2+ radial pulse   Special:                       Positive elbow flexion test    Median Nerve Exam Right Left   Phdurkan neg neg   Sensation normal normal   PCBr Sensation normal normal   APB Weakness No No   Thenar Atrophy No No     Ulnar Nerve Exam Right Left   Tinels neg neg   EF neg neg   Hypermobility @ elbow neg neg   Sensation normal normal   1st ROSANNA Weakness No No, (-) Wartenberg sign   Hypothenar Atrophy No No     Radial Nerve Exam  Right Left   Radial Sensory normal normal     CC: Pain in left hand    HPI: This 54 year old right-hand-dominant female presents with pain in the left hand.  She reports moderate severity pain.  She characterizes the pain as cramping, tingling, and burning.  She has tried a brace.  She reports nighttime symptoms.   Back in May, the patient noticed a painful lump on the side of her wrist. Given her history of lymphoma, she consulted her oncologist, who referred her to Dr. Gaitan. She had undergone chemotherapy for her lymphoma. The lump was diagnosed as a ganglion cyst. Currently, she reports issues with her pinky finger, noting that it doesn't move as it should. Additionally, she experiences coldness in parts of her hand and nighttime tingling sensations that interrupt her sleep. While she does have some right hand numbness, it's not significantly noticeable. Left side weaker than the right side.    Interval History: Patient underwent ulnar nerve transposition as well as Guyon's tunnel and carpal tunnel release.    Interval History (4/8/24): Patient is in office for a follow-up visit after surgery on 3/2/24 with slight soreness and mentions an uncomfortable feeling within her  pinky finger. Overall, she is doing well.     Interval History (5/15/24): Patient is in office for a follow-up visit after surgery on 3/2/24. Overall, patient is doing very well with minimal concerns.    Interval Hx (6/17/2024): Resolution Wartenberg's noted.  Pain is manageable.    Past Medical History:    Abnormal mammogram of left breast    Anaplastic large cell lymphoma, unspecified site, extranodal and solid organ sites    Anxiety state    Depression    DVT (deep venous thrombosis) (HCC)    High cholesterol    History of blood transfusion    Hypothyroidism    Hypothyroidism, primary    Other malignant lymphomas of intra-abdominal lymph nodes    Personal history of antineoplastic chemotherapy    Port catheter in place    Rheumatoid arthritis (HCC)    Sleep apnea    NO DEVICE USED.    Swelling of limb    Visual impairment    GLASSES     Past Surgical History:   Procedure Laterality Date    Bone marrow aspirate &biopsy  05/15/2014    Bone marrow/blood-derived peripheral stem cell transplantat; allogeneic donor lymphocyte infusions  2014    stem cell transplant    Colonoscopy N/A 2/18/2019    Procedure: COLONOSCOPY;  Surgeon: Larry Ladd MD;  Location: [facility] ENDOSCOPY    Hernia surgery  11/1999    Umbilical hernia repair    Ofe biopsy stereo nodule 1 site left (cpt=19081)      2015    Ofe biopsy stereo nodule 2 site bilat (cpt=19081/11028)  08/19/2015    Other surgical history      Injection Tendon Sheath, Ligament    Tubal ligation  2001     Current Outpatient Medications   Medication Sig Dispense Refill    metFORMIN  MG Oral Tablet 24 Hr Take 1 tablet (500 mg total) by mouth daily. 90 tablet 0    rivaroxaban (XARELTO) 10 MG Oral Tab Take 1 tablet (10 mg total) by mouth daily with food. 90 tablet 3    rosuvastatin 40 MG Oral Tab TAKE 1 TABLET BY MOUTH EVERY EVENING 90 tablet 3    levothyroxine 125 MCG Oral Tab Take 1 tablet (125 mcg total) by mouth before breakfast. 90 tablet 3    Cholecalciferol  (VITAMIN D) 1000 units Oral Tab Take 1,000 Units by mouth daily.         Allergies   Allergen Reactions    Aspirin OTHER (SEE COMMENTS)     Other reaction(s): GI problems     Family History   Problem Relation Age of Onset    Diabetes Father     Heart Disease Father     Other (Other) Father     Thyroid disease Sister     Other (lupus) Sister     Other (sjogren's) Sister     Heart Disease Other         Grandparents    Other (Other) Mother         Rheumatoid Arthritis    Breast Cancer Sister 47    Other (Other) Brother         Hemochromatosis    Melanoma Maternal Grandfather     Breast Cancer Paternal Aunt 65     Social History     Occupational History    Not on file   Tobacco Use    Smoking status: Never    Smokeless tobacco: Never   Vaping Use    Vaping status: Never Used   Substance and Sexual Activity    Alcohol use: Yes     Comment: Occ, once a month    Drug use: No    Sexual activity: Yes     Partners: Male     Birth control/protection: Tubal Ligation      Review of Systems (negative unless bolded):  General: fevers, chills, fatigue  CV:  chest pain, palpitations, leg swelling  Msk: bodyaches, neck pain, neck stiffness  Skin: rashes, open wounds, nonhealing ulcers  Hem: bleeds easily, bruise easily, immunocompromised  Neuro: dizziness, light headedness, headaches  Psych: anxious, depressed, anger issues    Willy Gaitan MD   Hand, Wrist, & Elbow Surgery  [email or web address]  t: [phone]  f: [phone]

## 2024-06-28 ENCOUNTER — OFFICE VISIT (OUTPATIENT)
Dept: ORTHOPEDICS CLINIC | Facility: CLINIC | Age: 54
End: 2024-06-28

## 2024-06-28 VITALS — BODY MASS INDEX: 39.53 KG/M2 | HEIGHT: 66 IN | WEIGHT: 246 LBS

## 2024-06-28 DIAGNOSIS — G56.02 CARPAL TUNNEL SYNDROME OF LEFT WRIST: ICD-10-CM

## 2024-06-28 DIAGNOSIS — G56.22 GUYON SYNDROME, LEFT: Primary | ICD-10-CM

## 2024-06-28 DIAGNOSIS — G56.22 CUBITAL TUNNEL SYNDROME ON LEFT: ICD-10-CM

## 2024-06-28 PROCEDURE — 3008F BODY MASS INDEX DOCD: CPT | Performed by: PHYSICIAN ASSISTANT

## 2024-06-28 PROCEDURE — 99024 POSTOP FOLLOW-UP VISIT: CPT | Performed by: PHYSICIAN ASSISTANT

## 2024-06-28 NOTE — PROGRESS NOTES
Clinic Note     Assessment/Plan:  54 year old female    Left elbow ulnar nerve transposition with Guyon's tunnel and carpal tunnel release 3/21/2024- Her postop pain is slowly improving.  Continue home exercise program.  Continue to monitor for sensory and motor recovery.  The tingling has improved which is reassuring.  Status post left ulnar trans muscular transposition with Guyon's canal release and carpal tunnel release on 6/14/2024 right cubital and carpal tunnel syndrome-stitches were removed today.  She can progress to range of motion exercises but no heavy lifting or weightbearing.   She does not have any significant changes in numbness and tingling or strength.  Will see how she does over the next 4 to 6 weeks.  All of her questions were answered.  Left wrist synovial cyst- Reviewed MRI; results were normal.    Follow Up: 4 to 6 weeks    Diagnostic Studies:  EMG/NCV:   1.electrodiagnostic evidence of a bilateral demyelinating and axonal ulnar mononeuropathy at the elbow with likely cubital tunnel syndrome.  There is sensory involvement in bilateral upper extremities.  There is borderline prolonged latency with decreased conduction velocity on motor ulnar nerve conduction studies as well.  Patient has a positive Wartenberg's sign in the left hand.  There are signs of active denervation in the left FCU muscle with signs of chronic reinnervation and signs of chronic reinnervation in the left FDI muscle.  Recommend cubital tunnel brace at nighttime in the left hand and orthopedic evaluation for consideration of surgical intervention.  2.There is electrodiagnostic evidence of a BILATERAL median sensory demyelinating more than axonal mononeuropathy at the wrist consistent with carpal tunnel syndrome.  There are signs of active denervation in the left APB muscle.     Physical Exam:    Ht 5' 6\" (1.676 m)   Wt 246 lb (111.6 kg)   LMP  (LMP Unknown)   BMI 39.71 kg/m²     Constitutional: NAD. AOx3. Well-developed  and Well-nourished.   Psychiatric: Normal mood/ affect/ behavior. Judgment and thought content normal.     Bilateral upper Extremity:   Inspection: Incision healing well with no signs of infection   Palpation: Mildly tender at the incisions   Motion: Elbow: normal bilateral symmetric ext/flex  Wrist: normal bilateral symmetric ext/flex/sup/pro  Finger: full composite fist   Vascular 2+ radial pulse   Special:                       Positive elbow flexion test    Median Nerve Exam Right Left   Phdurkan neg neg   Sensation normal normal   PCBr Sensation normal normal   APB Weakness No No   Thenar Atrophy No No     Ulnar Nerve Exam Right Left   Tinels neg neg   EF neg neg   Hypermobility @ elbow neg neg   Sensation normal normal   1st ROSANNA Weakness No No, (-) Wartenberg sign   Hypothenar Atrophy No No     Radial Nerve Exam  Right Left   Radial Sensory normal normal     CC: Pain in left hand    HPI: This 54 year old right-hand-dominant female presents with pain in the left hand.  She reports moderate severity pain.  She characterizes the pain as cramping, tingling, and burning.  She has tried a brace.  She reports nighttime symptoms.   Back in May, the patient noticed a painful lump on the side of her wrist. Given her history of lymphoma, she consulted her oncologist, who referred her to Dr. Gaitan. She had undergone chemotherapy for her lymphoma. The lump was diagnosed as a ganglion cyst. Currently, she reports issues with her pinky finger, noting that it doesn't move as it should. Additionally, she experiences coldness in parts of her hand and nighttime tingling sensations that interrupt her sleep. While she does have some right hand numbness, it's not significantly noticeable. Left side weaker than the right side.    Interval History: Patient underwent ulnar nerve transposition as well as Guyon's tunnel and carpal tunnel release.        Past Medical History:    Abnormal mammogram of left breast    Anaplastic large cell  lymphoma, unspecified site, extranodal and solid organ sites    Anxiety state    Depression    DVT (deep venous thrombosis) (HCC)    High cholesterol    History of blood transfusion    Hypothyroidism    Hypothyroidism, primary    Other malignant lymphomas of intra-abdominal lymph nodes    Personal history of antineoplastic chemotherapy    Port catheter in place    Rheumatoid arthritis (HCC)    Sleep apnea    NO DEVICE USED.    Swelling of limb    Visual impairment    GLASSES     Past Surgical History:   Procedure Laterality Date    Bone marrow aspirate &biopsy  05/15/2014    Bone marrow/blood-derived peripheral stem cell transplantat; allogeneic donor lymphocyte infusions  2014    stem cell transplant    Colonoscopy N/A 2/18/2019    Procedure: COLONOSCOPY;  Surgeon: Larry Ladd MD;  Location: [facility] ENDOSCOPY    Hernia surgery  11/1999    Umbilical hernia repair    Ofe biopsy stereo nodule 1 site left (cpt=19081)      2015    Ofe biopsy stereo nodule 2 site bilat (cpt=19081/03954)  08/19/2015    Other surgical history      Injection Tendon Sheath, Ligament    Tubal ligation  2001     Current Outpatient Medications   Medication Sig Dispense Refill    metFORMIN  MG Oral Tablet 24 Hr Take 1 tablet (500 mg total) by mouth daily. 90 tablet 0    rivaroxaban (XARELTO) 10 MG Oral Tab Take 1 tablet (10 mg total) by mouth daily with food. 90 tablet 3    rosuvastatin 40 MG Oral Tab TAKE 1 TABLET BY MOUTH EVERY EVENING 90 tablet 3    levothyroxine 125 MCG Oral Tab Take 1 tablet (125 mcg total) by mouth before breakfast. 90 tablet 3    Cholecalciferol (VITAMIN D) 1000 units Oral Tab Take 1,000 Units by mouth daily.         Allergies   Allergen Reactions    Aspirin OTHER (SEE COMMENTS)     Other reaction(s): GI problems     Family History   Problem Relation Age of Onset    Diabetes Father     Heart Disease Father     Other (Other) Father     Thyroid disease Sister     Other (lupus) Sister     Other (sjogren's) Sister      Heart Disease Other         Grandparents    Other (Other) Mother         Rheumatoid Arthritis    Breast Cancer Sister 47    Other (Other) Brother         Hemochromatosis    Melanoma Maternal Grandfather     Breast Cancer Paternal Aunt 65     Social History     Occupational History    Not on file   Tobacco Use    Smoking status: Never    Smokeless tobacco: Never   Vaping Use    Vaping status: Never Used   Substance and Sexual Activity    Alcohol use: Yes     Comment: Occ, once a month    Drug use: No    Sexual activity: Yes     Partners: Male     Birth control/protection: Tubal Ligation      Review of Systems (negative unless bolded):  General: fevers, chills, fatigue  CV:  chest pain, palpitations, leg swelling  Msk: bodyaches, neck pain, neck stiffness  Skin: rashes, open wounds, nonhealing ulcers  Hem: bleeds easily, bruise easily, immunocompromised  Neuro: dizziness, light headedness, headaches  Psych: anxious, depressed, anger issues  Mae King PA-C  Hand, Wrist, & Elbow Surgery  Physician Assistant to Dr. [email or web address] [email or web address]  t: [phone]  f: [phone]

## 2024-07-02 DIAGNOSIS — Z22.7 LATENT TUBERCULOSIS: Primary | ICD-10-CM

## 2024-07-05 ENCOUNTER — LAB ENCOUNTER (OUTPATIENT)
Dept: LAB | Age: 54
End: 2024-07-05
Attending: INTERNAL MEDICINE
Payer: COMMERCIAL

## 2024-07-05 DIAGNOSIS — Z22.7 LATENT TUBERCULOSIS: ICD-10-CM

## 2024-07-05 LAB
ALBUMIN SERPL-MCNC: 4.3 G/DL (ref 3.2–4.8)
ALBUMIN/GLOB SERPL: 1.7 {RATIO} (ref 1–2)
ALP LIVER SERPL-CCNC: 74 U/L
ALT SERPL-CCNC: 22 U/L
ANION GAP SERPL CALC-SCNC: 5 MMOL/L (ref 0–18)
AST SERPL-CCNC: 20 U/L (ref ?–34)
BASOPHILS # BLD AUTO: 0.05 X10(3) UL (ref 0–0.2)
BASOPHILS NFR BLD AUTO: 0.9 %
BILIRUB SERPL-MCNC: 0.4 MG/DL (ref 0.3–1.2)
BUN BLD-MCNC: 12 MG/DL (ref 9–23)
BUN/CREAT SERPL: 19 (ref 10–20)
CALCIUM BLD-MCNC: 9.4 MG/DL (ref 8.7–10.4)
CHLORIDE SERPL-SCNC: 111 MMOL/L (ref 98–112)
CO2 SERPL-SCNC: 29 MMOL/L (ref 21–32)
CREAT BLD-MCNC: 0.63 MG/DL
DEPRECATED RDW RBC AUTO: 44.3 FL (ref 35.1–46.3)
EGFRCR SERPLBLD CKD-EPI 2021: 104 ML/MIN/1.73M2 (ref 60–?)
EOSINOPHIL # BLD AUTO: 0.15 X10(3) UL (ref 0–0.7)
EOSINOPHIL NFR BLD AUTO: 2.6 %
ERYTHROCYTE [DISTWIDTH] IN BLOOD BY AUTOMATED COUNT: 13.6 % (ref 11–15)
FASTING STATUS PATIENT QL REPORTED: YES
GLOBULIN PLAS-MCNC: 2.5 G/DL (ref 2–3.5)
GLUCOSE BLD-MCNC: 94 MG/DL (ref 70–99)
HCT VFR BLD AUTO: 44 %
HGB BLD-MCNC: 15.1 G/DL
IMM GRANULOCYTES # BLD AUTO: 0.01 X10(3) UL (ref 0–1)
IMM GRANULOCYTES NFR BLD: 0.2 %
LYMPHOCYTES # BLD AUTO: 2.51 X10(3) UL (ref 1–4)
LYMPHOCYTES NFR BLD AUTO: 43.3 %
MCH RBC QN AUTO: 30.6 PG (ref 26–34)
MCHC RBC AUTO-ENTMCNC: 34.3 G/DL (ref 31–37)
MCV RBC AUTO: 89.1 FL
MONOCYTES # BLD AUTO: 0.47 X10(3) UL (ref 0.1–1)
MONOCYTES NFR BLD AUTO: 8.1 %
NEUTROPHILS # BLD AUTO: 2.61 X10 (3) UL (ref 1.5–7.7)
NEUTROPHILS # BLD AUTO: 2.61 X10(3) UL (ref 1.5–7.7)
NEUTROPHILS NFR BLD AUTO: 44.9 %
OSMOLALITY SERPL CALC.SUM OF ELEC: 300 MOSM/KG (ref 275–295)
PLATELET # BLD AUTO: 216 10(3)UL (ref 150–450)
POTASSIUM SERPL-SCNC: 4 MMOL/L (ref 3.5–5.1)
PROT SERPL-MCNC: 6.8 G/DL (ref 5.7–8.2)
RBC # BLD AUTO: 4.94 X10(6)UL
SODIUM SERPL-SCNC: 145 MMOL/L (ref 136–145)
WBC # BLD AUTO: 5.8 X10(3) UL (ref 4–11)

## 2024-07-05 PROCEDURE — 80053 COMPREHEN METABOLIC PANEL: CPT

## 2024-07-05 PROCEDURE — 36415 COLL VENOUS BLD VENIPUNCTURE: CPT

## 2024-07-05 PROCEDURE — 85025 COMPLETE CBC W/AUTO DIFF WBC: CPT

## 2024-07-24 NOTE — TELEPHONE ENCOUNTER
Patient is requesting a medication refill and mentions she only has enough for this week.   Patient has scheduled an appointment to establish care with new provider.    Patient is asking if there is an alternative to this medication - as research shows the long term usage may have side effects.    Metformin    [facility] in [place]  confirmed preferred

## 2024-07-24 NOTE — TELEPHONE ENCOUNTER
[employer] message sent to patient, await reply.         Requested Prescriptions     Pending Prescriptions Disp Refills    metFORMIN  MG Oral Tablet 24 Hr 90 tablet 3     Sig: Take 1 tablet (500 mg total) by mouth daily.         Future Appointments   Date Time Provider Department Center   8/7/2024  8:00 AM Mae King PA [facility] [place]   8/9/2024 11:00 AM Hudson Salazar PA-C [facility]   8/12/2024  3:20 PM Nely Kirkland MD ECL[facility]OBGYN [facility] [place]   8/14/2024  3:00 PM Maria Fernanda Ribera MD [id number] [facility] [place]

## 2024-07-25 RX ORDER — METFORMIN HYDROCHLORIDE 500 MG/1
500 TABLET, EXTENDED RELEASE ORAL DAILY
Qty: 90 TABLET | Refills: 0 | Status: SHIPPED | OUTPATIENT
Start: 2024-07-25

## 2024-07-25 NOTE — TELEPHONE ENCOUNTER
Patient advised to discuss change of medication at office visit 8/14/24   Requesting a refill will be out by appt

## 2024-08-07 ENCOUNTER — OFFICE VISIT (OUTPATIENT)
Dept: ORTHOPEDICS CLINIC | Facility: CLINIC | Age: 54
End: 2024-08-07
Payer: COMMERCIAL

## 2024-08-07 VITALS — HEIGHT: 66 IN | WEIGHT: 246 LBS | BODY MASS INDEX: 39.53 KG/M2

## 2024-08-07 DIAGNOSIS — G56.22 GUYON SYNDROME, LEFT: Primary | ICD-10-CM

## 2024-08-07 DIAGNOSIS — G56.02 CARPAL TUNNEL SYNDROME OF LEFT WRIST: ICD-10-CM

## 2024-08-07 PROCEDURE — 99024 POSTOP FOLLOW-UP VISIT: CPT | Performed by: PHYSICIAN ASSISTANT

## 2024-08-07 PROCEDURE — 3008F BODY MASS INDEX DOCD: CPT | Performed by: PHYSICIAN ASSISTANT

## 2024-08-07 RX ORDER — ISONIAZID 300 MG/1
300 TABLET ORAL DAILY
COMMUNITY
Start: 2024-07-02

## 2024-08-07 RX ORDER — PYRIDOXINE HCL (VITAMIN B6) 50 MG
TABLET ORAL
COMMUNITY
Start: 2024-07-02

## 2024-08-07 NOTE — PROGRESS NOTES
Clinic Note     Assessment/Plan:  54 year old female    Left elbow ulnar nerve transposition with Guyon's tunnel and carpal tunnel release 3/21/2024-healed well.  Resolution of numbness and tingling.  Improve strength.  Status post left ulnar trans muscular transposition with Guyon's canal release and carpal tunnel release on 6/14/2024 patient will progress to heavier activities as tolerated.  Her numbness and tingling has significantly improved almost completely gone.  She is able to adduct the small finger which she is happy about.  Strength should continue to improve.  She does have standard amount of scar tissue buildup in the palm which she had on her other side.  This should continue to improve with time.  All of her questions were answered.  Left wrist synovial cyst- Reviewed MRI; results were normal.    Follow Up: 6-8 weeks    Diagnostic Studies:  EMG/NCV:   1.electrodiagnostic evidence of a bilateral demyelinating and axonal ulnar mononeuropathy at the elbow with likely cubital tunnel syndrome.  There is sensory involvement in bilateral upper extremities.  There is borderline prolonged latency with decreased conduction velocity on motor ulnar nerve conduction studies as well.  Patient has a positive Wartenberg's sign in the left hand.  There are signs of active denervation in the left FCU muscle with signs of chronic reinnervation and signs of chronic reinnervation in the left FDI muscle.  Recommend cubital tunnel brace at nighttime in the left hand and orthopedic evaluation for consideration of surgical intervention.  2.There is electrodiagnostic evidence of a BILATERAL median sensory demyelinating more than axonal mononeuropathy at the wrist consistent with carpal tunnel syndrome.  There are signs of active denervation in the left APB muscle.     Physical Exam:    Ht 5' 6\" (1.676 m)   Wt 246 lb (111.6 kg)   LMP  (LMP Unknown)   BMI 39.71 kg/m²     Constitutional: NAD. AOx3. Well-developed and  Well-nourished.   Psychiatric: Normal mood/ affect/ behavior. Judgment and thought content normal.     Bilateral upper Extremity:   Inspection: Incision healing well with no signs of infection   Palpation: Mildly tender at the incisions   Motion: Elbow: normal bilateral symmetric ext/flex  Wrist: normal bilateral symmetric ext/flex/sup/pro  Finger: full composite fist   Vascular 2+ radial pulse   Special:                       Positive elbow flexion test    Median Nerve Exam Right Left   Phdurkan neg neg   Sensation normal normal   PCBr Sensation normal normal   APB Weakness No No   Thenar Atrophy No No     Ulnar Nerve Exam Right Left   Tinels neg neg   EF neg neg   Hypermobility @ elbow neg neg   Sensation normal normal   1st ROSANNA Weakness No No, (-) Wartenberg sign   Hypothenar Atrophy No No     Radial Nerve Exam  Right Left   Radial Sensory normal normal     CC: Pain in left hand    HPI: This 54 year old right-hand-dominant female presents with pain in the left hand.  She reports moderate severity pain.  She characterizes the pain as cramping, tingling, and burning.  She has tried a brace.  She reports nighttime symptoms.   Back in May, the patient noticed a painful lump on the side of her wrist. Given her history of lymphoma, she consulted her oncologist, who referred her to Dr. Gaitan. She had undergone chemotherapy for her lymphoma. The lump was diagnosed as a ganglion cyst. Currently, she reports issues with her pinky finger, noting that it doesn't move as it should. Additionally, she experiences coldness in parts of her hand and nighttime tingling sensations that interrupt her sleep. While she does have some right hand numbness, it's not significantly noticeable. Left side weaker than the right side.    Interval History: Patient underwent ulnar nerve transposition as well as Guyon's tunnel and carpal tunnel release.        Past Medical History:    Abnormal mammogram of left breast    Anaplastic large cell  lymphoma, unspecified site, extranodal and solid organ sites    Anxiety state    Depression    DVT (deep venous thrombosis) (HCC)    High cholesterol    History of blood transfusion    Hypothyroidism    Hypothyroidism, primary    Other malignant lymphomas of intra-abdominal lymph nodes    Personal history of antineoplastic chemotherapy    Port catheter in place    Rheumatoid arthritis (HCC)    Sleep apnea    NO DEVICE USED.    Swelling of limb    Visual impairment    GLASSES     Past Surgical History:   Procedure Laterality Date    Bone marrow aspirate &biopsy  05/15/2014    Bone marrow/blood-derived peripheral stem cell transplantat; allogeneic donor lymphocyte infusions  2014    stem cell transplant    Colonoscopy N/A 2/18/2019    Procedure: COLONOSCOPY;  Surgeon: Larry Ladd MD;  Location: [facility] ENDOSCOPY    Hernia surgery  11/1999    Umbilical hernia repair    Ofe biopsy stereo nodule 1 site left (cpt=19081)      2015    Ofe biopsy stereo nodule 2 site bilat (cpt=19081/83237)  08/19/2015    Other surgical history      Injection Tendon Sheath, Ligament    Tubal ligation  2001     Current Outpatient Medications   Medication Sig Dispense Refill    isoniazid 300 MG Oral Tab Take 1 tablet (300 mg total) by mouth daily.      Pyridoxine HCl (B-6) 50 MG Oral Tab TAKE 1 TABLET BY MOUTH ONCE A DAY WITH ISONIAZIDE DAILY      metFORMIN  MG Oral Tablet 24 Hr Take 1 tablet (500 mg total) by mouth daily. 90 tablet 0    rivaroxaban (XARELTO) 10 MG Oral Tab Take 1 tablet (10 mg total) by mouth daily with food. 90 tablet 3    rosuvastatin 40 MG Oral Tab TAKE 1 TABLET BY MOUTH EVERY EVENING 90 tablet 3    levothyroxine 125 MCG Oral Tab Take 1 tablet (125 mcg total) by mouth before breakfast. 90 tablet 3    Cholecalciferol (VITAMIN D) 1000 units Oral Tab Take 1,000 Units by mouth daily.         Allergies   Allergen Reactions    Aspirin OTHER (SEE COMMENTS)     Other reaction(s): GI problems     Family History    Problem Relation Age of Onset    Diabetes Father     Heart Disease Father     Other (Other) Father     Thyroid disease Sister     Other (lupus) Sister     Other (sjogren's) Sister     Heart Disease Other         Grandparents    Other (Other) Mother         Rheumatoid Arthritis    Breast Cancer Sister 47    Other (Other) Brother         Hemochromatosis    Melanoma Maternal Grandfather     Breast Cancer Paternal Aunt 65     Social History     Occupational History    Not on file   Tobacco Use    Smoking status: Never    Smokeless tobacco: Never   Vaping Use    Vaping status: Never Used   Substance and Sexual Activity    Alcohol use: Yes     Comment: Occ, once a month    Drug use: No    Sexual activity: Yes     Partners: Male     Birth control/protection: Tubal Ligation      Review of Systems (negative unless bolded):  General: fevers, chills, fatigue  CV:  chest pain, palpitations, leg swelling  Msk: bodyaches, neck pain, neck stiffness  Skin: rashes, open wounds, nonhealing ulcers  Hem: bleeds easily, bruise easily, immunocompromised  Neuro: dizziness, light headedness, headaches  Psych: anxious, depressed, anger issues  Mae King PA-C  Hand, Wrist, & Elbow Surgery  Physician Assistant to Dr. [email or web address] [email or web address]  t: [phone]  f: [phone]

## 2024-08-09 ENCOUNTER — OFFICE VISIT (OUTPATIENT)
Dept: PULMONOLOGY | Facility: CLINIC | Age: 54
End: 2024-08-09

## 2024-08-09 VITALS
DIASTOLIC BLOOD PRESSURE: 75 MMHG | SYSTOLIC BLOOD PRESSURE: 125 MMHG | WEIGHT: 246 LBS | BODY MASS INDEX: 39.53 KG/M2 | HEART RATE: 76 BPM | HEIGHT: 66 IN | OXYGEN SATURATION: 96 %

## 2024-08-09 DIAGNOSIS — G47.33 OBSTRUCTIVE SLEEP APNEA: Primary | ICD-10-CM

## 2024-08-09 PROCEDURE — 3078F DIAST BP <80 MM HG: CPT | Performed by: PHYSICIAN ASSISTANT

## 2024-08-09 PROCEDURE — 3074F SYST BP LT 130 MM HG: CPT | Performed by: PHYSICIAN ASSISTANT

## 2024-08-09 PROCEDURE — 3008F BODY MASS INDEX DOCD: CPT | Performed by: PHYSICIAN ASSISTANT

## 2024-08-09 PROCEDURE — 99243 OFF/OP CNSLTJ NEW/EST LOW 30: CPT | Performed by: PHYSICIAN ASSISTANT

## 2024-08-09 NOTE — PROGRESS NOTES
[facility] order, demographics face sheet, base line sleep study report and chart notes faxed to [facility] at fax#[phone] with confirmation.

## 2024-08-09 NOTE — PROGRESS NOTES
Pulmonary Consult Note    History of Present Illness:  Daisy Hamm is a 54 year old female presenting to pulmonary clinic today for MALOU, referred by pcp Dr. Deloris Lima. She is known patient to Dr. Sanchez, last seen in 2019. The patient has history of mild MALOU which becomes moderately severe in REM (combined AHI 11.8, REM AHI 28.4). She tried CPAP on two separate occasions and did not tolerate. She last tried CPAP 0558-8060 and did not tolerate despite trial of multiple face mask styles. She has been off CPAP for 4 years and is not interested in resuming. She is here to discuss alternatives to CPAP therapy. She admits to heroic snoring and witnessed apneic events but denies awakening gasping for air. She goes to bed at 10 PM, falls asleep promptly, and awakens at 5 AM. No nocturnal awakenings. Sleep is unrefreshing and she feels tired all of the time. She dozes off occasionally in the evenings while watching tv. She takes occasional naps. No AM headaches. No drowsy driving. No urge to move the limbs, sleep paralysis, cataplexy, or hypnagogic hallucinations. She has lost 12 pounds intentionally in the last 3 months. Patient recently had positive TB quantiferon x2 with negative chest x-ray. She denies shortness of breath, cough, hemoptysis, fever, chills, and night sweats. She is following with ID for latent TB and started on isoniazid 1 month ago which she is tolerating well.    Past Medical History: MALOU, latent TB, hypothyroidism, non-hodkins lymphoma, osteoarthritis, HLD, DVT, RA    Past Surgical History: Breast biopsy, bone marrow biopsy, stem cell transplant, tubal ligation, umbilical hernia repair, bilateral carpal tunnel release, left ulnar nerve transposition with Guyon's tunnel release    Family Medical History: Mother  with RA, father alive with DM and heart disease    Social History: , has 5 biological children, 3 foster children, works as teacher  -Tobacco: Lifelong  nonsmoker  -Alcohol: None    Allergies: Aspirin     Medications: has a current medication list which includes the following prescription(s): isoniazid, b-6, metformin er, rivaroxaban, rosuvastatin, levothyroxine, and vitamin d.    Review of Systems:   Constitutional: +Weight loss of 12 pounds intentionally in 3 months. No fever, chills, or night sweats.  HEENT: +Nasal congestion. No vision changes.   Cardio: No chest pain or palpitations.  Respiratory: No SOB, cough, hemoptysis, wheezing, or pleurisy.   GI: No acid reflux.  Extremities: Left lower extremity lymphedema. No right lower extremity swelling.  Neurologic: No headache.  Psych: No depression.     Physical Exam:  /75   Pulse 76   Ht 5' 6\" (1.676 m)   Wt 246 lb (111.6 kg)   LMP  (LMP Unknown)   SpO2 96%   BMI 39.71 kg/m²    Constitutional: Obese. No acute distress.  HEENT: Head NC/AT. PEERL. Crowded oropharynx. Mallampati class 4.  Cardio: Regular rate and rhythm. Normal S1 and S2. No murmurs.   Respiratory: Thorax symmetrical with no labored breathing. Clear to ausculation bilaterally with symmetrical breath sounds. No wheezing, rhonchi, or crackles.   Extremities: No clubbing or cyanosis. No LE edema. No calf tenderness.  Neurologic: A&Ox3. No gross motor deficits.  Skin: Warm, dry.  Lymphatic: No cervical or supraclavicular lymphadenopathy.  Psych: Calm, cooperative. Pleasant affect.    Results:  -PSG 2018: Combined AHI 11.8, REM AHI 28.4    -Chest x-ray 5/2024: Normal    Assessment/Plan:  MALOU  Mild MALOU which becomes moderately severe in REM. Has significant clinical syndrome with snoring, witnessed apneic events, and excessive daytime somnolence. Previously tried CPAP on two prior occasions and did not tolerate. Not interested in resuming CPAP. Discussed alternatives to CPAP therapy including weight loss and mandibular advancement device.  Plan:  -Weight loss  -Referral to [facility] for mandibular advancement device  -Avoid  alcohol and sedating drugs  -Never drive if sleepy  -Follow up as needed    Latent TB  TB quantiferon positive x2 with negative chest x-ray and no respiratory symptoms. Following with ID and started on isoniazid which she is tolerating.  Plan:  -Continue isoniazid as per ID    History of DVT  On Xarelto for unprovoked DVT. Follows with hematology.  Plan:  -Xarelto  -Follow up with hematology    Hudson Salazar PA-C  Pulmonary Medicine  8/9/2024

## 2024-08-12 ENCOUNTER — OFFICE VISIT (OUTPATIENT)
Dept: OBGYN CLINIC | Facility: CLINIC | Age: 54
End: 2024-08-12
Payer: COMMERCIAL

## 2024-08-12 VITALS
HEIGHT: 66 IN | BODY MASS INDEX: 38.28 KG/M2 | SYSTOLIC BLOOD PRESSURE: 112 MMHG | HEART RATE: 90 BPM | WEIGHT: 238.19 LBS | DIASTOLIC BLOOD PRESSURE: 82 MMHG

## 2024-08-12 DIAGNOSIS — Z12.31 ENCOUNTER FOR SCREENING MAMMOGRAM FOR BREAST CANCER: ICD-10-CM

## 2024-08-12 DIAGNOSIS — Z01.419 ENCOUNTER FOR GYNECOLOGICAL EXAMINATION: Primary | ICD-10-CM

## 2024-08-12 DIAGNOSIS — Z12.4 SCREENING FOR CERVICAL CANCER: ICD-10-CM

## 2024-08-12 PROCEDURE — 87624 HPV HI-RISK TYP POOLED RSLT: CPT | Performed by: OBSTETRICS & GYNECOLOGY

## 2024-08-12 NOTE — PROGRESS NOTES
Daisy Hamm is a 54 year old female  No LMP recorded (lmp unknown). (Menstrual status: Chemo). here for annual exam.       Last seen 2023.   Last pap 3/2021 normal with negative HPV  Last mammogram 2024    No gyne issues.    History of DVT, on Xeralto.  History of non Hodgkin lymphoma.   Has lymphedema of lower extremities    OBSTETRICS HISTORY:  OB History    Para Term  AB Living   5 5 5 0 0 5   SAB IAB Ectopic Multiple Live Births   0 0 0 0 5       GYNE HISTORY   Menarche: 11  Period Cycle (Days): NO MENSES DUE TO CHEMO   Period Duration (Days): NO MENSES DUE TO CHEMO   Use of Birth Control (if yes, specify type): TUBAL LIGATION   Pap Date: 21  Pap Result Notes: Pap neg...MAMMO 24/ DIAG NEG    MEDICAL HISTORY:  Past Medical History:    Abnormal mammogram of left breast    Anaplastic large cell lymphoma, unspecified site, extranodal and solid organ sites    Anxiety state    Depression    DVT (deep venous thrombosis) (HCC)    High cholesterol    History of blood transfusion    Hypothyroidism    Hypothyroidism, primary    Latent tuberculosis    Other malignant lymphomas of intra-abdominal lymph nodes    Personal history of antineoplastic chemotherapy    Port catheter in place    Pre-diabetes    Rheumatoid arthritis (HCC)    Sleep apnea    NO DEVICE USED.    Swelling of limb    Visual impairment    GLASSES     Past Surgical History:   Procedure Laterality Date    Bone marrow aspirate &biopsy  05/15/2014    Bone marrow/blood-derived peripheral stem cell transplantat; allogeneic donor lymphocyte infusions      stem cell transplant    Colonoscopy N/A 2019    Procedure: COLONOSCOPY;  Surgeon: Larry Ladd MD;  Location: [facility] ENDOSCOPY    Hernia surgery  1999    Umbilical hernia repair    Ofe biopsy stereo nodule 1 site left (cpt=19081)          Ofe biopsy stereo nodule 2 site bilat (cpt=19081/02227)  2015    Other surgical history      Injection  Tendon Sheath, Ligament    Other surgical history Bilateral 06/2024    nerve surgery in arms    Tubal ligation  2001       SOCIAL HISTORY:  Social History     Socioeconomic History    Marital status:    Tobacco Use    Smoking status: Never    Smokeless tobacco: Never   Vaping Use    Vaping status: Never Used   Substance and Sexual Activity    Alcohol use: Not Currently    Drug use: No    Sexual activity: Yes     Partners: Male     Birth control/protection: Tubal Ligation   Other Topics Concern    Caffeine Concern Yes     Comment: Soda, Coffee - 2 cups daily       FAMILY HISTORY:  Family History   Problem Relation Age of Onset    Other (Other) Mother         Rheumatoid Arthritis    Diabetes Father     Heart Disease Father     Thyroid disease Sister     Other (lupus) Sister     Other (sjogren's) Sister     Breast Cancer Sister 47    Other (Other) Brother         Hemochromatosis    Melanoma Maternal Grandfather     Breast Cancer Paternal Aunt 65    Heart Disease Other         Grandparents       MEDICATIONS:  Current Outpatient Medications   Medication Sig Dispense Refill    isoniazid 300 MG Oral Tab Take 1 tablet (300 mg total) by mouth daily.      Pyridoxine HCl (B-6) 50 MG Oral Tab TAKE 1 TABLET BY MOUTH ONCE A DAY WITH ISONIAZIDE DAILY      metFORMIN  MG Oral Tablet 24 Hr Take 1 tablet (500 mg total) by mouth daily. 90 tablet 0    rivaroxaban (XARELTO) 10 MG Oral Tab Take 1 tablet (10 mg total) by mouth daily with food. 90 tablet 3    rosuvastatin 40 MG Oral Tab TAKE 1 TABLET BY MOUTH EVERY EVENING 90 tablet 3    levothyroxine 125 MCG Oral Tab Take 1 tablet (125 mcg total) by mouth before breakfast. 90 tablet 3    Cholecalciferol (VITAMIN D) 1000 units Oral Tab Take 1,000 Units by mouth daily.           ALLERGIES:    Allergies   Allergen Reactions    Aspirin OTHER (SEE COMMENTS)     Other reaction(s): GI problems         Depression Screening (PHQ-2/PHQ-9): Over the LAST 2 WEEKS   Little interest or  pleasure in doing things (over the last two weeks)?: Not at all    Feeling down, depressed, or hopeless (over the last two weeks)?: Not at all    PHQ-2 SCORE: 0           Review of Systems:  Constitutional:  Denies fatigue, night sweats, hot flashes  Eyes:  denies blurred or double vision  Cardiovascular:  denies chest pain or palpitations  Respiratory:  denies shortness of breath  Gastrointestinal:  denies heartburn, abdominal pain, diarrhea or constipation  Genitourinary:  denies dysuria, incontinence, abnormal vaginal discharge, vaginal itching  Musculoskeletal:  denies back pain.  Skin/Breast:  Denies any breast pain, lumps, or discharge.   Neurological:  denies headaches, extremity weakness or numbness.  Psychiatric: denies depression or anxiety.  Endocrine:   denies excessive thirst or urination.  Heme/Lymph:  easy bruising or bleeding.    PHYSICAL EXAM:   /82   Pulse 90   Ht 5' 6\" (1.676 m)   Wt 238 lb 3.2 oz (108 kg)   LMP  (LMP Unknown)   BMI 38.45 kg/m²   Wt Readings from Last 2 Encounters:   08/12/24 238 lb 3.2 oz (108 kg)   08/09/24 246 lb (111.6 kg)     Body mass index is 38.45 kg/m².    Constitutional: well developed, well nourished  Neck/Thyroid: thyroid symmetric, no nodules  Heart:  Regular rate and rhythm  Lungs:  Clear to asculation  Breast: normal without palpable masses, tenderness, asymmetry, nipple discharge, nipple retraction or skin changes  Abdomen:  soft, nontender, nondistended, no masses  Psychiatric:  Oriented to time, place, person and situation. Appropriate mood and affect    Pelvic Exam:  External Genitalia: normal appearance, hair distribution, and no lesions  Urethral Meatus:  normal in size, location, without lesions and prolapse  Bladder:  No fullness, masses or tenderness  Vagina:  Normal appearance without lesions, no abnormal discharge  Cervix:  Normal without tenderness on motion  Uterus: normal in size, contour, position, mobility, without tenderness  Adnexa:  normal without masses or tenderness  Perineum/anus: normal      Assessment & Plan:    Daisy Hamm is a 54 year old female who presents for an annual physical exam.    1. Encounter for gynecological examination  Pap and HPV.   Will do Pap/HPV q 3 years.   Annual exams encouraged.  Order for mammogram to be done 5/2025.  RTC 1 year or prn     2. Encounter for screening mammogram for breast cancer  - [facility] SANDRINE 2D+3D SCREENING BILAT (CPT=77067/07484); Future        Requested Prescriptions      No prescriptions requested or ordered in this encounter         Nely Kirkland MD  8/12/2024  3:37 PM

## 2024-08-13 LAB — HPV E6+E7 MRNA CVX QL NAA+PROBE: NEGATIVE

## 2024-08-14 ENCOUNTER — OFFICE VISIT (OUTPATIENT)
Dept: INTERNAL MEDICINE CLINIC | Facility: CLINIC | Age: 54
End: 2024-08-14
Payer: COMMERCIAL

## 2024-08-14 VITALS
DIASTOLIC BLOOD PRESSURE: 78 MMHG | BODY MASS INDEX: 38.41 KG/M2 | HEIGHT: 66 IN | SYSTOLIC BLOOD PRESSURE: 113 MMHG | RESPIRATION RATE: 18 BRPM | WEIGHT: 239 LBS | HEART RATE: 94 BPM

## 2024-08-14 DIAGNOSIS — E78.00 HYPERCHOLESTEREMIA: ICD-10-CM

## 2024-08-14 DIAGNOSIS — E03.9 ACQUIRED HYPOTHYROIDISM: Primary | ICD-10-CM

## 2024-08-14 DIAGNOSIS — Z22.7 LTBI (LATENT TUBERCULOSIS INFECTION): ICD-10-CM

## 2024-08-14 DIAGNOSIS — R73.01 IMPAIRED FASTING BLOOD SUGAR: ICD-10-CM

## 2024-08-14 DIAGNOSIS — I82.439 ACUTE DEEP VEIN THROMBOSIS (DVT) OF POPLITEAL VEIN, UNSPECIFIED LATERALITY (HCC): ICD-10-CM

## 2024-08-14 PROCEDURE — 3078F DIAST BP <80 MM HG: CPT | Performed by: INTERNAL MEDICINE

## 2024-08-14 PROCEDURE — 99214 OFFICE O/P EST MOD 30 MIN: CPT | Performed by: INTERNAL MEDICINE

## 2024-08-14 PROCEDURE — 3008F BODY MASS INDEX DOCD: CPT | Performed by: INTERNAL MEDICINE

## 2024-08-14 PROCEDURE — 3074F SYST BP LT 130 MM HG: CPT | Performed by: INTERNAL MEDICINE

## 2024-08-14 NOTE — PROGRESS NOTES
Daisy Hamm is a 54 year old female.  Chief Complaint   Patient presents with    Establish Care     HPI:    Patient presented today for follow up. She state that she is worried about side effects from metformin. States her symptoms have improved with metformin. No GI side effects. Otherwise feels well. Also establishing pcp today.    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 In remission. H/o DVT diagnosed in 12/2021 on xarelto. Recently diagnosed with LTBI, started on INH. Follows up with ID and Pulm. No respiratory symptoms.       Current Outpatient Medications   Medication Sig Dispense Refill    isoniazid 300 MG Oral Tab Take 1 tablet (300 mg total) by mouth daily.      Pyridoxine HCl (B-6) 50 MG Oral Tab TAKE 1 TABLET BY MOUTH ONCE A DAY WITH ISONIAZIDE DAILY      metFORMIN  MG Oral Tablet 24 Hr Take 1 tablet (500 mg total) by mouth daily. 90 tablet 0    rivaroxaban (XARELTO) 10 MG Oral Tab Take 1 tablet (10 mg total) by mouth daily with food. 90 tablet 3    rosuvastatin 40 MG Oral Tab TAKE 1 TABLET BY MOUTH EVERY EVENING 90 tablet 3    levothyroxine 125 MCG Oral Tab Take 1 tablet (125 mcg total) by mouth before breakfast. 90 tablet 3    Cholecalciferol (VITAMIN D) 1000 units Oral Tab Take 1,000 Units by mouth daily.          Past Medical History:    Abnormal mammogram of left breast    Anaplastic large cell lymphoma, unspecified site, extranodal and solid organ sites    Anxiety state    Depression    DVT (deep venous thrombosis) (HCC)    High cholesterol    History of blood transfusion    Hypothyroidism    Hypothyroidism, primary    Latent tuberculosis    Other malignant lymphomas of intra-abdominal lymph nodes    Personal history of antineoplastic chemotherapy    Port catheter in place    Pre-diabetes    Rheumatoid arthritis (HCC)    Sleep apnea    NO DEVICE USED.     Swelling of limb    Visual impairment    GLASSES      Past Surgical History:   Procedure Laterality Date    Bone marrow aspirate &biopsy  05/15/2014    Bone marrow/blood-derived peripheral stem cell transplantat; allogeneic donor lymphocyte infusions  2014    stem cell transplant    Colonoscopy N/A 02/18/2019    Procedure: COLONOSCOPY;  Surgeon: Larry Ladd MD;  Location: [facility] ENDOSCOPY    Hernia surgery  11/1999    Umbilical hernia repair    Ofe biopsy stereo nodule 1 site left (cpt=19081)      2015    Ofe biopsy stereo nodule 2 site bilat (cpt=19081/13694)  08/19/2015    Other surgical history      Injection Tendon Sheath, Ligament    Other surgical history Bilateral 06/2024    nerve surgery in arms    Tubal ligation  2001      Social History:  Social History     Socioeconomic History    Marital status:    Tobacco Use    Smoking status: Never    Smokeless tobacco: Never   Vaping Use    Vaping status: Never Used   Substance and Sexual Activity    Alcohol use: Not Currently    Drug use: No    Sexual activity: Yes     Partners: Male     Birth control/protection: Tubal Ligation   Other Topics Concern    Caffeine Concern Yes     Comment: Soda, Coffee - 2 cups daily      Family History   Problem Relation Age of Onset    Other (Other) Mother         Rheumatoid Arthritis    Diabetes Father     Heart Disease Father     Thyroid disease Sister     Other (lupus) Sister     Other (sjogren's) Sister     Breast Cancer Sister 47    Other (Other) Brother         Hemochromatosis    Melanoma Maternal Grandfather     Breast Cancer Paternal Aunt 65    Heart Disease Other         Grandparents      Allergies   Allergen Reactions    Aspirin OTHER (SEE COMMENTS)     Other reaction(s): GI problems        REVIEW OF SYSTEMS:   Review of Systems   Review of Systems   Constitutional: Negative for activity change, appetite change and fever.   HENT: Negative for congestion and voice change.    Respiratory: Negative for cough and  shortness of breath.    Cardiovascular: Negative for chest pain.   Gastrointestinal: Negative for abdominal distention, abdominal pain and vomiting.   Genitourinary: Negative for hematuria.   Skin: Negative for wound.   Psychiatric/Behavioral: Negative for behavioral problems.   Wt Readings from Last 5 Encounters:   08/14/24 239 lb (108.4 kg)   08/12/24 238 lb 3.2 oz (108 kg)   08/09/24 246 lb (111.6 kg)   08/07/24 246 lb (111.6 kg)   06/28/24 246 lb (111.6 kg)     Body mass index is 38.58 kg/m².      EXAM:   /78 (BP Location: Right arm, Patient Position: Sitting, Cuff Size: adult)   Pulse 94   Resp 18   Ht 5' 6\" (1.676 m)   Wt 239 lb (108.4 kg)   LMP  (LMP Unknown)   BMI 38.58 kg/m²   Physical Exam   Constitutional:       Appearance: Normal appearance.   HENT:      Head: Normocephalic.   Eyes:      Conjunctiva/sclera: Conjunctivae normal.   Cardiovascular:      Rate and Rhythm: Normal rate and regular rhythm.      Heart sounds: Normal heart sounds. No murmur heard.  Pulmonary:      Effort: Pulmonary effort is normal.      Breath sounds: Normal breath sounds. No rhonchi or rales.   Abdominal:      General: Bowel sounds are normal.      Palpations: Abdomen is soft.      Tenderness: There is no abdominal tenderness.   Musculoskeletal:      Cervical back: Neck supple.      Right lower leg: No edema.      Left lower leg: No edema.   Skin:     General: Skin is warm and dry.   Neurological:      General: No focal deficit present.      Mental Status: He is alert and oriented to person, place, and time. Mental status is at baseline.   Psychiatric:         Mood and Affect: Mood normal.         Behavior: Behavior normal.       ASSESSMENT AND PLAN:   1. Acquired hypothyroidism  - continue synthroid    2. Impaired fasting blood sugar  - continue metformin   - side effects discussed    3. Hypercholesteremia  - continue statin    4. LTBI (latent tuberculosis infection)  - on INH and pyridoxine  - follow up ID    5.  Acute deep vein thrombosis (DVT) of popliteal vein, unspecified laterality (HCC)  - on xarelto  - follow up with hematology      The patient indicates understanding of these issues and agrees to the plan.  Follow up in 6 months    Maria Fernanda Ribera MD

## 2024-09-25 ENCOUNTER — PATIENT MESSAGE (OUTPATIENT)
Dept: INTERNAL MEDICINE CLINIC | Facility: CLINIC | Age: 54
End: 2024-09-25

## 2024-09-25 DIAGNOSIS — R74.8 ELEVATED LIVER ENZYMES: Primary | ICD-10-CM

## 2024-09-30 ENCOUNTER — LAB ENCOUNTER (OUTPATIENT)
Dept: LAB | Age: 54
End: 2024-09-30
Attending: INTERNAL MEDICINE
Payer: COMMERCIAL

## 2024-09-30 DIAGNOSIS — R74.8 ELEVATED LIVER ENZYMES: ICD-10-CM

## 2024-09-30 DIAGNOSIS — Z22.7 LATENT TUBERCULOSIS: ICD-10-CM

## 2024-09-30 LAB
ALBUMIN SERPL-MCNC: 4.2 G/DL (ref 3.2–4.8)
ALP LIVER SERPL-CCNC: 72 U/L
ALT SERPL-CCNC: 69 U/L
AST SERPL-CCNC: 69 U/L (ref ?–34)
BILIRUB DIRECT SERPL-MCNC: 0.2 MG/DL (ref ?–0.3)
BILIRUB SERPL-MCNC: 0.4 MG/DL (ref 0.3–1.2)
PROT SERPL-MCNC: 6.8 G/DL (ref 5.7–8.2)

## 2024-09-30 PROCEDURE — 36415 COLL VENOUS BLD VENIPUNCTURE: CPT

## 2024-09-30 PROCEDURE — 80076 HEPATIC FUNCTION PANEL: CPT

## 2024-10-09 ENCOUNTER — TELEPHONE (OUTPATIENT)
Dept: INTERNAL MEDICINE CLINIC | Facility: CLINIC | Age: 54
End: 2024-10-09

## 2024-10-09 DIAGNOSIS — Z51.81 THERAPEUTIC DRUG MONITORING: ICD-10-CM

## 2024-10-09 DIAGNOSIS — E03.9 ACQUIRED HYPOTHYROIDISM: ICD-10-CM

## 2024-10-09 DIAGNOSIS — R74.8 ELEVATED LIVER ENZYMES: Primary | ICD-10-CM

## 2024-10-09 RX ORDER — LEVOTHYROXINE SODIUM 125 UG/1
125 TABLET ORAL
Qty: 90 TABLET | Refills: 3 | Status: CANCELLED | OUTPATIENT
Start: 2024-10-09

## 2024-10-09 NOTE — TELEPHONE ENCOUNTER
Discussed with the patient  Liver enzyme have improved mildly than before.  She is on Isoniazid  Will continue to monitor her liver enzymes and cbc while on the medication.   Repeat labs in 2 weeks

## 2024-10-10 ENCOUNTER — TELEPHONE (OUTPATIENT)
Dept: INTERNAL MEDICINE CLINIC | Facility: CLINIC | Age: 54
End: 2024-10-10

## 2024-10-10 DIAGNOSIS — E03.9 ACQUIRED HYPOTHYROIDISM: ICD-10-CM

## 2024-10-10 DIAGNOSIS — Z79.01 CURRENT USE OF ANTICOAGULANT THERAPY: ICD-10-CM

## 2024-10-10 DIAGNOSIS — Z86.718 HISTORY OF DVT (DEEP VEIN THROMBOSIS): ICD-10-CM

## 2024-10-10 RX ORDER — ROSUVASTATIN CALCIUM 40 MG/1
TABLET, COATED ORAL
Qty: 90 TABLET | Refills: 3 | Status: SHIPPED | OUTPATIENT
Start: 2024-10-10

## 2024-10-10 RX ORDER — PYRIDOXINE HCL (VITAMIN B6) 50 MG
50 TABLET ORAL DAILY
Qty: 90 TABLET | Refills: 3 | Status: SHIPPED | OUTPATIENT
Start: 2024-10-10

## 2024-10-10 RX ORDER — LEVOTHYROXINE SODIUM 125 UG/1
125 TABLET ORAL
Qty: 90 TABLET | Refills: 3 | Status: SHIPPED | OUTPATIENT
Start: 2024-10-10

## 2024-10-10 RX ORDER — METFORMIN HCL 500 MG
500 TABLET, EXTENDED RELEASE 24 HR ORAL DAILY
Qty: 90 TABLET | Refills: 3 | Status: SHIPPED | OUTPATIENT
Start: 2024-10-10

## 2024-10-11 ENCOUNTER — TELEPHONE (OUTPATIENT)
Dept: HEMATOLOGY/ONCOLOGY | Facility: HOSPITAL | Age: 54
End: 2024-10-11

## 2024-10-11 NOTE — TELEPHONE ENCOUNTER
Called and spoke with Daisy. Told her Dr. Tolbert is unavailable for her scheduled appointment time of 4:00 pm on Monday, 10/14, so we have to reschedule. Appointment rescheduled for 9:00 am on Monday, 10/14. Also, reminded her there are labs ordered by Dr. Tolbert that he wants her to have done before her appointment. She stated understanding and is going to the lab tomorrow.

## 2024-10-12 ENCOUNTER — LAB ENCOUNTER (OUTPATIENT)
Dept: LAB | Age: 54
End: 2024-10-12
Attending: INTERNAL MEDICINE
Payer: COMMERCIAL

## 2024-10-12 DIAGNOSIS — Z85.72 ENCOUNTER FOR FOLLOW-UP SURVEILLANCE OF LYMPHOMA: ICD-10-CM

## 2024-10-12 DIAGNOSIS — Z08 ENCOUNTER FOR FOLLOW-UP SURVEILLANCE OF LYMPHOMA: ICD-10-CM

## 2024-10-12 LAB
ALBUMIN SERPL-MCNC: 4.3 G/DL (ref 3.2–4.8)
ALBUMIN/GLOB SERPL: 1.7 {RATIO} (ref 1–2)
ALP LIVER SERPL-CCNC: 72 U/L
ALT SERPL-CCNC: 62 U/L
ANION GAP SERPL CALC-SCNC: 4 MMOL/L (ref 0–18)
AST SERPL-CCNC: 65 U/L (ref ?–34)
BASOPHILS # BLD AUTO: 0.06 X10(3) UL (ref 0–0.2)
BASOPHILS NFR BLD AUTO: 1.2 %
BILIRUB SERPL-MCNC: 0.4 MG/DL (ref 0.3–1.2)
BUN BLD-MCNC: 12 MG/DL (ref 9–23)
BUN/CREAT SERPL: 19 (ref 10–20)
CALCIUM BLD-MCNC: 9.3 MG/DL (ref 8.7–10.4)
CHLORIDE SERPL-SCNC: 109 MMOL/L (ref 98–112)
CO2 SERPL-SCNC: 31 MMOL/L (ref 21–32)
CREAT BLD-MCNC: 0.63 MG/DL
DEPRECATED RDW RBC AUTO: 45.1 FL (ref 35.1–46.3)
EGFRCR SERPLBLD CKD-EPI 2021: 104 ML/MIN/1.73M2 (ref 60–?)
EOSINOPHIL # BLD AUTO: 0.15 X10(3) UL (ref 0–0.7)
EOSINOPHIL NFR BLD AUTO: 3 %
ERYTHROCYTE [DISTWIDTH] IN BLOOD BY AUTOMATED COUNT: 13.6 % (ref 11–15)
FASTING STATUS PATIENT QL REPORTED: YES
GLOBULIN PLAS-MCNC: 2.5 G/DL (ref 2–3.5)
GLUCOSE BLD-MCNC: 94 MG/DL (ref 70–99)
HCT VFR BLD AUTO: 45.4 %
HGB BLD-MCNC: 14.7 G/DL
IMM GRANULOCYTES # BLD AUTO: 0.02 X10(3) UL (ref 0–1)
IMM GRANULOCYTES NFR BLD: 0.4 %
LDH SERPL L TO P-CCNC: 194 U/L
LYMPHOCYTES # BLD AUTO: 1.84 X10(3) UL (ref 1–4)
LYMPHOCYTES NFR BLD AUTO: 37.2 %
MCH RBC QN AUTO: 29.1 PG (ref 26–34)
MCHC RBC AUTO-ENTMCNC: 32.4 G/DL (ref 31–37)
MCV RBC AUTO: 89.7 FL
MONOCYTES # BLD AUTO: 0.44 X10(3) UL (ref 0.1–1)
MONOCYTES NFR BLD AUTO: 8.9 %
NEUTROPHILS # BLD AUTO: 2.43 X10 (3) UL (ref 1.5–7.7)
NEUTROPHILS # BLD AUTO: 2.43 X10(3) UL (ref 1.5–7.7)
NEUTROPHILS NFR BLD AUTO: 49.3 %
OSMOLALITY SERPL CALC.SUM OF ELEC: 298 MOSM/KG (ref 275–295)
PLATELET # BLD AUTO: 224 10(3)UL (ref 150–450)
POTASSIUM SERPL-SCNC: 4.1 MMOL/L (ref 3.5–5.1)
PROT SERPL-MCNC: 6.8 G/DL (ref 5.7–8.2)
RBC # BLD AUTO: 5.06 X10(6)UL
SODIUM SERPL-SCNC: 144 MMOL/L (ref 136–145)
WBC # BLD AUTO: 4.9 X10(3) UL (ref 4–11)

## 2024-10-12 PROCEDURE — 85025 COMPLETE CBC W/AUTO DIFF WBC: CPT

## 2024-10-12 PROCEDURE — 80053 COMPREHEN METABOLIC PANEL: CPT

## 2024-10-12 PROCEDURE — 83615 LACTATE (LD) (LDH) ENZYME: CPT

## 2024-10-12 PROCEDURE — 36415 COLL VENOUS BLD VENIPUNCTURE: CPT

## 2024-10-14 ENCOUNTER — OFFICE VISIT (OUTPATIENT)
Dept: HEMATOLOGY/ONCOLOGY | Facility: HOSPITAL | Age: 54
End: 2024-10-14
Attending: INTERNAL MEDICINE
Payer: COMMERCIAL

## 2024-10-14 VITALS
RESPIRATION RATE: 18 BRPM | HEART RATE: 86 BPM | BODY MASS INDEX: 38.01 KG/M2 | HEIGHT: 66 IN | DIASTOLIC BLOOD PRESSURE: 78 MMHG | TEMPERATURE: 98 F | SYSTOLIC BLOOD PRESSURE: 113 MMHG | WEIGHT: 236.5 LBS | OXYGEN SATURATION: 96 %

## 2024-10-14 DIAGNOSIS — Z79.01 CURRENT USE OF ANTICOAGULANT THERAPY: ICD-10-CM

## 2024-10-14 DIAGNOSIS — Z86.718 HISTORY OF DVT (DEEP VEIN THROMBOSIS): Primary | ICD-10-CM

## 2024-10-14 DIAGNOSIS — Z08 ENCOUNTER FOR FOLLOW-UP SURVEILLANCE OF LYMPHOMA: ICD-10-CM

## 2024-10-14 DIAGNOSIS — R79.89 ELEVATED LFTS: ICD-10-CM

## 2024-10-14 DIAGNOSIS — Z85.72 ENCOUNTER FOR FOLLOW-UP SURVEILLANCE OF LYMPHOMA: ICD-10-CM

## 2024-10-14 DIAGNOSIS — Z94.84 HISTORY OF STEM CELL TRANSPLANT (HCC): ICD-10-CM

## 2024-10-14 PROCEDURE — 99214 OFFICE O/P EST MOD 30 MIN: CPT | Performed by: INTERNAL MEDICINE

## 2024-10-14 NOTE — PROGRESS NOTES
Name: Daisy Hamm  MRN: [id number]  YOB: 1970  [facility]: [id number]  Date of Visit: 10/14/2024      Chief Complaint: History of DVT of left leg--unprovoked, Hx of ALCL, ALK negative, stage III      HPI:  Ms. Hamm is seen in the office for follow up regarding ALK negative ALCL.  Patient was treated in the past with 6 cycles of cytoxan/adriamycin/vincristine from 6/2014 to 9/24/14. She also underwent stem cell transplant in 11/2014 at [facility].  During her follow up in 3/2017 pt reported new redness of face, arms and thighs for a week. Also noted some subcutaneous nodules of the forearms and thighs. Pt was very anxious that lymphoma was recurring. Upon questioning, denies B symptoms. Completed repeat full body CT scan which showed no evidence of disease recurrence.  Daisy underwent surveillance imaging in March 2018 showed no evidence of lymphoma recurrence.    September, 2019 marked this patient's 5yr anniversary from her lymphoma diagnosis& chemotherapy treatment. Daisy mentions that the time of her diagnosis she presented with profound fatigue, profound itching throughout her body, unilateral left lower extremity swelling, labs were also notable for an elevated LDH.  Daisy has had some intermittent left lower extremity swelling which is mild and has been ongoing for the last 5 years and unchanged; no swelling to the level that she experienced in 2014. Daisy mentions the swelling resolves by the morning with elevation of her legs.    Daisy had a newly discovered left leg DVT in the popliteal fossa found in 12/2021.The patient underwent lab testing and found to have a weakly positive anticardiolipin IgM antibody measured at 10; values> 40 are considered to be positive.     A surveillance CT scan in 3/2022 of her left leg and body show no signs of recurrent lymphoma. The patient had tenosynovitis and arthritis features affecting her left leg causing pain.    Interval  History:  Presents today for follow-up based on hx of DVT of the leg, and hx of lymphoma. She underwent CT imaging in 5/2023 consistent with ganglion cyst. Patient has been working with PMR and is thankful for her progress. Patient was diagnosed with latent TB and has been on INH for last 3 mo with plans for 9 months of tx.    Daisy denies any signs or symptoms suggest of DVT recurrence or development of PE. Tolerating anticoagulation well w/o bleeding or bruising symptoms, no reports of intracranial bleeding, GI bleeding or falls.  Daiys denies any systemic signs of illness as she reports her weight change is from the TB meds. She has some fatigue and occasional nausea related to recent TB meds; otherwise, no new concerns on ROS  She has been working with lymphedema clinic with improvement of left leg swelling.     Review of Systems:     Review of Systems   Constitutional:  Negative for activity change, appetite change, chills, diaphoresis, fatigue, fever and unexpected weight change.   HENT:  Negative for congestion, ear pain, nosebleeds, postnasal drip, sore throat, trouble swallowing and voice change.    Eyes:  Negative for discharge, redness, itching and visual disturbance.   Respiratory:  Negative for cough, chest tightness and shortness of breath.    Cardiovascular:  Negative for chest pain, palpitations and leg swelling.        Improved left lower extremity swelling   Gastrointestinal:  Negative for abdominal distention, abdominal pain, blood in stool, constipation, diarrhea, nausea and vomiting.   Genitourinary:  Negative for dysuria, flank pain, hematuria and urgency.   Musculoskeletal:  Negative for arthralgias, back pain, gait problem, joint swelling, neck pain and neck stiffness.   Skin:  Negative for pallor and rash.   Neurological:  Negative for dizziness, seizures, weakness, light-headedness and headaches.   Hematological:  Negative for adenopathy. Does not bruise/bleed easily.    Psychiatric/Behavioral:  Negative for agitation and confusion. The patient is not nervous/anxious.          Current Outpatient Medications   Medication Sig Dispense Refill    levothyroxine 125 MCG Oral Tab Take 1 tablet (125 mcg total) by mouth before breakfast. 90 tablet 3    rosuvastatin 40 MG Oral Tab TAKE 1 TABLET BY MOUTH EVERY EVENING 90 tablet 3    metFORMIN  MG Oral Tablet 24 Hr Take 1 tablet (500 mg total) by mouth daily. 90 tablet 3    rivaroxaban (XARELTO) 10 MG Oral Tab Take 1 tablet (10 mg total) by mouth daily with food. 90 tablet 3    Pyridoxine HCl (B-6) 50 MG Oral Tab Take 1 tablet (50 mg total) by mouth daily. 90 tablet 3    isoniazid 300 MG Oral Tab Take 1 tablet (300 mg total) by mouth daily.      Cholecalciferol (VITAMIN D) 1000 units Oral Tab Take 1,000 Units by mouth daily.         Allergies:   Allergies   Allergen Reactions    Aspirin OTHER (SEE COMMENTS)     Other reaction(s): GI problems       Past Medical History:    Abnormal mammogram of left breast    Anaplastic large cell lymphoma, unspecified site, extranodal and solid organ sites    Anxiety state    Depression    DVT (deep venous thrombosis) (HCC)    High cholesterol    History of blood transfusion    Hypothyroidism    Hypothyroidism, primary    Latent tuberculosis    Other malignant lymphomas of intra-abdominal lymph nodes    Personal history of antineoplastic chemotherapy    Port catheter in place    Pre-diabetes    Rheumatoid arthritis (HCC)    Sleep apnea    NO DEVICE USED.    Swelling of limb    Visual impairment    GLASSES     Past Surgical History:   Procedure Laterality Date    Bone marrow aspirate &biopsy  05/15/2014    Bone marrow/blood-derived peripheral stem cell transplantat; allogeneic donor lymphocyte infusions  2014    stem cell transplant    Colonoscopy N/A 02/18/2019    Procedure: COLONOSCOPY;  Surgeon: Larry Ladd MD;  Location: [facility] ENDOSCOPY    Hernia surgery  11/1999    Umbilical hernia repair     Ofe biopsy stereo nodule 1 site left (cpt=19081)      2015    Ofe biopsy stereo nodule 2 site bilat (cpt=19081/71688)  08/19/2015    Other surgical history      Injection Tendon Sheath, Ligament    Other surgical history Bilateral 06/2024    nerve surgery in arms    Tubal ligation  2001     Social History     Socioeconomic History    Marital status:      Spouse name: Not on file    Number of children: Not on file    Years of education: Not on file    Highest education level: Not on file   Occupational History    Not on file   Tobacco Use    Smoking status: Never    Smokeless tobacco: Never   Vaping Use    Vaping status: Never Used   Substance and Sexual Activity    Alcohol use: Not Currently    Drug use: No    Sexual activity: Yes     Partners: Male     Birth control/protection: Tubal Ligation   Other Topics Concern     Service Not Asked    Blood Transfusions Not Asked    Caffeine Concern Yes     Comment: Soda, Coffee - 2 cups daily    Occupational Exposure Not Asked    Hobby Hazards Not Asked    Sleep Concern Not Asked    Stress Concern Not Asked    Weight Concern Not Asked    Special Diet Not Asked    Back Care Not Asked    Exercise Not Asked    Bike Helmet Not Asked    Seat Belt Not Asked    Self-Exams Not Asked    Grew up on a farm Not Asked    History of tanning Not Asked    Outdoor occupation Not Asked    Pt has a pacemaker Not Asked    Pt has a defibrillator Not Asked    Breast feeding Not Asked    Reaction to local anesthetic Not Asked   Social History Narrative    Not on file     Social Drivers of Health     Financial Resource Strain: Not on file   Food Insecurity: Not on file   Transportation Needs: Not on file   Physical Activity: Not on file   Stress: Not on file   Social Connections: Not on file   Housing Stability: Not on file     Family History   Problem Relation Age of Onset    Other (Other) Mother         Rheumatoid Arthritis    Diabetes Father     Heart Disease Father     Thyroid  disease Sister     Other (lupus) Sister     Other (sjogren's) Sister     Breast Cancer Sister 47    Other (Other) Brother         Hemochromatosis    Melanoma Maternal Grandfather     Breast Cancer Paternal Aunt 65    Heart Disease Other         Grandparents         PHYSICAL EXAM:    /78 (BP Location: Left arm, Patient Position: Sitting, Cuff Size: large)   Pulse 86   Temp 97.6 °F (36.4 °C) (Oral)   Resp 18   Ht 1.676 m (5' 6\")   Wt 107.3 kg (236 lb 8 oz)   LMP  (LMP Unknown)   SpO2 96%   BMI 38.17 kg/m²     Physical Exam  Constitutional:       General: She is not in acute distress.     Appearance: She is well-developed.   HENT:      Head: Normocephalic and atraumatic.      Nose: Nose normal.      Mouth/Throat:      Pharynx: No oropharyngeal exudate.   Eyes:      Conjunctiva/sclera: Conjunctivae normal.      Pupils: Pupils are equal, round, and reactive to light.   Neck:      Thyroid: No thyromegaly.      Vascular: No JVD.      Trachea: No tracheal deviation.   Cardiovascular:      Rate and Rhythm: Normal rate.      Heart sounds: Normal heart sounds. No murmur heard.     No friction rub.   Pulmonary:      Effort: Pulmonary effort is normal. No respiratory distress.      Breath sounds: Normal breath sounds.   Chest:      Chest wall: No tenderness.   Abdominal:      General: Bowel sounds are normal. There is no distension.      Palpations: Abdomen is soft. There is no mass.      Tenderness: There is no abdominal tenderness.   Musculoskeletal:         General: No tenderness. Normal range of motion.      Cervical back: Normal range of motion and neck supple.   Lymphadenopathy:      Head:      Right side of head: No submental or submandibular adenopathy.      Left side of head: No submental or submandibular adenopathy.      Cervical: No cervical adenopathy.      Upper Body:      Right upper body: No supraclavicular adenopathy.      Left upper body: No supraclavicular adenopathy.      Lower Body: No right  inguinal adenopathy. No left inguinal adenopathy.      Comments: No palpable LN.   Skin:     General: Skin is warm.      Findings: No erythema or rash.   Neurological:      Mental Status: She is alert and oriented to person, place, and time.   Psychiatric:         Behavior: Behavior normal.         Judgment: Judgment normal.       Lab Results   Component Value Date    WBC 4.9 10/12/2024    RBC 5.06 10/12/2024    HGB 14.7 10/12/2024    HCT 45.4 10/12/2024    MCV 89.7 10/12/2024    MCH 29.1 10/12/2024    MCHC 32.4 10/12/2024    RDW 13.6 10/12/2024    .0 10/12/2024    MPV 9.2 08/11/2018     Lab Results   Component Value Date    GLU 94 10/12/2024    BUN 12 10/12/2024    BUNCREA 19.0 10/12/2024    CREATSERUM 0.63 10/12/2024    ANIONGAP 4 10/12/2024    GFR >60 07/12/2016    GFRNAA 90 03/03/2022    GFRAA 104 03/03/2022    CA 9.3 10/12/2024    OSMOCALC 298 (H) 10/12/2024    ALKPHO 72 10/12/2024    AST 65 (H) 10/12/2024    ALT 62 (H) 10/12/2024    ALKPHOS 77 08/02/2016    BILT 0.4 10/12/2024    TP 6.8 10/12/2024    ALB 4.3 10/12/2024    GLOBULIN 2.5 10/12/2024    AGRATIO 1.2 08/02/2016     10/12/2024    K 4.1 10/12/2024     10/12/2024    CO2 31.0 10/12/2024         Imaging:            ASSESSMENT/PLAN:   No diagnosis found.     1.) Hx of NHL - ALK negative ALCL, stage III   Patient was treated in the past with 6 cycles of cytoxan/adriamycin/vincristine from 6/2014 to 9/24/14.   She also underwent stem cell transplant in 11/2014 at [facility].  In regards to the ALK negative ALCL, Ms. Hamm feels quite well without any signs or symptoms suggestive of disease recurrence.   No palpable LN.  Pt is now >5 years out from diagnosis & treatment (as of 9/2019). Her scans in 3/2018 are reported as having no suspicious lymphadenopathy in neck, chest, abdomen, or pelvis.    Patient is also following-up with [facility] where she was transplanted by Dr. Luli Jenkins, who she sees once a year.    Previously discussed a plan to repeat CT imaging based on new clinical signs or symptoms suggestive of recurrence; in light of her newly discovered LLE DVT (though imaging suggests this non occlusive thrombus is likely chronic) her LDH level is NOT elevated  --12/2021, I reviewed her CT scan with her, which shows no signs of lymphoma recurrence  --so, there are no provoking DVT features endorsed by HPI  --I have reviewed her scans with her in 3/2022. She did not have signs of lymphoma recurrence. I have referred her to PM&R for the tenosynovitis and arthritis features.   --Daisy, is here for her planned follow up. Low suspicion for lymphoma recurrence now that she is 10 yrs out from stem cell transplant, would repeat imaging in the setting concerning for recurrence  2.) DVT of the left leg  --Discussed plans for continuation of Xarelto for at least 6 months followed by repeat ultrasound imaging as this appears to be unprovoked; however, repeat ultrasound imaging above shows her clot has resolved  --continue Xarelto 10 mg/daily preventative dosing now as this was an unprovoked clot; RTC for follow up on long term anticoagulation in 1 yr, will notify us when she needs a refill  --she had antiphospholipid testing drawn at the time of her clot.  Discussed with her antiphospholipid antibody syndrome is an acquired condition not hereditary  --found to have a weakly positive anticardiolipin IgM antibody measured at 10; values> 40 are considered to be positive. Pt was noted to have no evidence of antiphospholipid antibody and beta-2 glycoprotein levels were completely normal.   --Repeat anticardiolipin levels could be drawn in 3 months.  However, false positive results can develop in the setting of anticoagulation  3.) Left wrist pain  --reviewed her imaging; her H&P is reassuring, I suspect she has a ganglion cyst as well as lipoma, low clinical suspicion for lymphoma recurrence  -- LDH ordered as well as CT left  wrist; seems consistent with ganglion cyst  4.) Elevated LFT's  --secondary to medication from latent TB; pt is aware that her values are improving    MDM: Moderate Risk    Ritchie Tolbert MD  [facility]  [facility] [address] [address]

## 2024-10-15 NOTE — TELEPHONE ENCOUNTER
Already addressed    Attempted to notify patient of breast biopsy results and recommendations, no answer. Will attempt again at later time.   None

## 2024-10-21 ENCOUNTER — IMMUNIZATION (OUTPATIENT)
Dept: LAB | Age: 54
End: 2024-10-21
Attending: EMERGENCY MEDICINE
Payer: COMMERCIAL

## 2024-10-21 DIAGNOSIS — Z23 NEED FOR VACCINATION: Primary | ICD-10-CM

## 2024-10-21 PROCEDURE — 90480 ADMN SARSCOV2 VAC 1/ONLY CMP: CPT

## 2024-10-23 ENCOUNTER — LAB ENCOUNTER (OUTPATIENT)
Dept: LAB | Age: 54
End: 2024-10-23
Attending: INTERNAL MEDICINE
Payer: COMMERCIAL

## 2024-10-23 DIAGNOSIS — R74.8 ELEVATED LIVER ENZYMES: ICD-10-CM

## 2024-10-23 DIAGNOSIS — Z51.81 THERAPEUTIC DRUG MONITORING: ICD-10-CM

## 2024-10-23 LAB
ALBUMIN SERPL-MCNC: 4.3 G/DL (ref 3.2–4.8)
ALBUMIN/GLOB SERPL: 1.7 {RATIO} (ref 1–2)
ALP LIVER SERPL-CCNC: 69 U/L
ALT SERPL-CCNC: 63 U/L
ANION GAP SERPL CALC-SCNC: 7 MMOL/L (ref 0–18)
AST SERPL-CCNC: 76 U/L (ref ?–34)
BASOPHILS # BLD AUTO: 0.06 X10(3) UL (ref 0–0.2)
BASOPHILS NFR BLD AUTO: 1.1 %
BILIRUB SERPL-MCNC: 0.5 MG/DL (ref 0.3–1.2)
BUN BLD-MCNC: 12 MG/DL (ref 9–23)
BUN/CREAT SERPL: 19 (ref 10–20)
CALCIUM BLD-MCNC: 9.4 MG/DL (ref 8.7–10.4)
CHLORIDE SERPL-SCNC: 110 MMOL/L (ref 98–112)
CO2 SERPL-SCNC: 26 MMOL/L (ref 21–32)
CREAT BLD-MCNC: 0.63 MG/DL
DEPRECATED RDW RBC AUTO: 43.7 FL (ref 35.1–46.3)
EGFRCR SERPLBLD CKD-EPI 2021: 104 ML/MIN/1.73M2 (ref 60–?)
EOSINOPHIL # BLD AUTO: 0.15 X10(3) UL (ref 0–0.7)
EOSINOPHIL NFR BLD AUTO: 2.7 %
ERYTHROCYTE [DISTWIDTH] IN BLOOD BY AUTOMATED COUNT: 13.5 % (ref 11–15)
FASTING STATUS PATIENT QL REPORTED: NO
GLOBULIN PLAS-MCNC: 2.5 G/DL (ref 2–3.5)
GLUCOSE BLD-MCNC: 103 MG/DL (ref 70–99)
HCT VFR BLD AUTO: 43.2 %
HGB BLD-MCNC: 14.3 G/DL
IMM GRANULOCYTES # BLD AUTO: 0.01 X10(3) UL (ref 0–1)
IMM GRANULOCYTES NFR BLD: 0.2 %
LYMPHOCYTES # BLD AUTO: 1.88 X10(3) UL (ref 1–4)
LYMPHOCYTES NFR BLD AUTO: 33.5 %
MCH RBC QN AUTO: 29.1 PG (ref 26–34)
MCHC RBC AUTO-ENTMCNC: 33.1 G/DL (ref 31–37)
MCV RBC AUTO: 87.8 FL
MONOCYTES # BLD AUTO: 0.48 X10(3) UL (ref 0.1–1)
MONOCYTES NFR BLD AUTO: 8.5 %
NEUTROPHILS # BLD AUTO: 3.04 X10 (3) UL (ref 1.5–7.7)
NEUTROPHILS # BLD AUTO: 3.04 X10(3) UL (ref 1.5–7.7)
NEUTROPHILS NFR BLD AUTO: 54 %
OSMOLALITY SERPL CALC.SUM OF ELEC: 296 MOSM/KG (ref 275–295)
PLATELET # BLD AUTO: 205 10(3)UL (ref 150–450)
POTASSIUM SERPL-SCNC: 3.9 MMOL/L (ref 3.5–5.1)
PROT SERPL-MCNC: 6.8 G/DL (ref 5.7–8.2)
RBC # BLD AUTO: 4.92 X10(6)UL
SODIUM SERPL-SCNC: 143 MMOL/L (ref 136–145)
WBC # BLD AUTO: 5.6 X10(3) UL (ref 4–11)

## 2024-10-23 PROCEDURE — 85025 COMPLETE CBC W/AUTO DIFF WBC: CPT

## 2024-10-23 PROCEDURE — 36415 COLL VENOUS BLD VENIPUNCTURE: CPT

## 2024-10-23 PROCEDURE — 80053 COMPREHEN METABOLIC PANEL: CPT

## 2024-10-24 ENCOUNTER — TELEPHONE (OUTPATIENT)
Dept: INTERNAL MEDICINE CLINIC | Facility: CLINIC | Age: 54
End: 2024-10-24

## 2024-10-24 DIAGNOSIS — R74.8 ELEVATED LIVER ENZYMES: Primary | ICD-10-CM

## 2024-10-28 ENCOUNTER — LAB ENCOUNTER (OUTPATIENT)
Dept: LAB | Age: 54
End: 2024-10-28
Attending: NURSE PRACTITIONER
Payer: COMMERCIAL

## 2024-10-28 ENCOUNTER — PATIENT MESSAGE (OUTPATIENT)
Dept: INTERNAL MEDICINE CLINIC | Facility: CLINIC | Age: 54
End: 2024-10-28

## 2024-10-28 ENCOUNTER — NURSE TRIAGE (OUTPATIENT)
Dept: INTERNAL MEDICINE CLINIC | Facility: CLINIC | Age: 54
End: 2024-10-28

## 2024-10-28 ENCOUNTER — OFFICE VISIT (OUTPATIENT)
Dept: INTERNAL MEDICINE CLINIC | Facility: CLINIC | Age: 54
End: 2024-10-28

## 2024-10-28 VITALS
TEMPERATURE: 98 F | BODY MASS INDEX: 38.22 KG/M2 | HEART RATE: 85 BPM | WEIGHT: 237.81 LBS | SYSTOLIC BLOOD PRESSURE: 100 MMHG | DIASTOLIC BLOOD PRESSURE: 70 MMHG | HEIGHT: 66 IN

## 2024-10-28 DIAGNOSIS — C84.78 ANAPLASTIC LARGE CELL LYMPHOMA, ALK-NEGATIVE, LYMPH NODES OF MULTIPLE SITES (HCC): Primary | ICD-10-CM

## 2024-10-28 DIAGNOSIS — Z86.39 HISTORY OF DIABETES MELLITUS: ICD-10-CM

## 2024-10-28 DIAGNOSIS — Z22.7 TB LUNG, LATENT: ICD-10-CM

## 2024-10-28 DIAGNOSIS — R39.9 UTI SYMPTOMS: ICD-10-CM

## 2024-10-28 DIAGNOSIS — R31.1 BENIGN ESSENTIAL MICROSCOPIC HEMATURIA: ICD-10-CM

## 2024-10-28 LAB
BILIRUB UR QL: NEGATIVE
COLOR UR: YELLOW
GLUCOSE UR-MCNC: NORMAL MG/DL
KETONES UR-MCNC: NEGATIVE MG/DL
LEUKOCYTE ESTERASE UR QL STRIP.AUTO: 500
NITRITE UR QL STRIP.AUTO: NEGATIVE
PH UR: 6 [PH] (ref 5–8)
PROT UR-MCNC: 50 MG/DL
RBC #/AREA URNS AUTO: >10 /HPF
SP GR UR STRIP: 1.03 (ref 1–1.03)
UROBILINOGEN UR STRIP-ACNC: 3
WBC #/AREA URNS AUTO: >50 /HPF

## 2024-10-28 PROCEDURE — 81001 URINALYSIS AUTO W/SCOPE: CPT

## 2024-10-28 PROCEDURE — 87086 URINE CULTURE/COLONY COUNT: CPT

## 2024-10-28 NOTE — TELEPHONE ENCOUNTER
Action Requested: Summary for Provider     []  Critical Lab, Recommendations Needed  [] Need Additional Advice  [x]   FYI    []   Need Orders  [] Need Medications Sent to Pharmacy  []  Other     SUMMARY: Spoke with patient, Date of Birth verified  She complaint of lower back pain for  a month and today she has the urge to use the bathroom frequently with each time she only urinate a little,  this morning there was blood in her urine. She is currently on a blood thinner , on xarelto 10 mg every day.   Also she is being treated by Dr Chapman ([place]) for latent TB, labs for LFT test every 2 weeks.   She denies chest pain, shortness of breath, fever, no other sx.   She was advised if symptom persist or gets worse to go to ER/ IC, she agreed and stated understanding.   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   Appt made with Brenda VUONG for eval & treat.        Reason for call: lower back pain  Onset: a month ago        Reason for Disposition   Taking Coumadin (warfarin) or other strong blood thinner, or known bleeding disorder (e.g., thrombocytopenia)    Protocols used: Urine - Blood In-A-OH    Future Appointments   Date Time Provider Department Center   10/28/2024  4:00 PM [facility]   10/13/2025  9:00 AM Ritchie Tolbert MD [facility]

## 2024-10-29 ENCOUNTER — TELEPHONE (OUTPATIENT)
Dept: INTERNAL MEDICINE CLINIC | Facility: CLINIC | Age: 54
End: 2024-10-29

## 2024-10-29 RX ORDER — CIPROFLOXACIN 500 MG/1
500 TABLET, FILM COATED ORAL 2 TIMES DAILY
Qty: 10 TABLET | Refills: 0 | Status: SHIPPED | OUTPATIENT
Start: 2024-10-29 | End: 2024-11-03

## 2024-10-29 NOTE — ASSESSMENT & PLAN NOTE
UTI symptoms    Plan  Urinalysis, Routine [E]   Normal, Routine, Future, Expected: 10/28/2024, Expires: 10/28/2025, Resulting [facility] ([facility]) Release to patient: Immediate New collection  Urine Culture, Routine [E]   Normal, Routine, Future, Expected: 10/28/2024 Approximate, Expires: 10/28/2025, Resulting [facility] ([facility]), Specimen Sources - Urine, clean catch;, New collectionUrology Referral - In Network   P - RFL, Routine, To - ASHLEY SANCHEZ 1 visit

## 2024-10-29 NOTE — ASSESSMENT & PLAN NOTE
Diagnosis with Latent TB    Plan    On TB medication    isoniazid 300 MG Oral Tab Take 1 tablet (300 mg total) by mouth daily.

## 2024-10-29 NOTE — TELEPHONE ENCOUNTER
Per patient, she received an [facility] message this morning about the antibiotic for her UTI.   She called her pharmacy and was told that they never received any prescription.   Informed that it was sent to [facility] in [place], but her preferred pharmacy is [facility] in [place].   Preferred pharmacy updated.  Resent the antibiotic prescription to [facility] per patient's request.        LABS 10/28/24;    Daisy Hamm     Positive bacteria and blood.  Will send antibiotics to your pharmacy     Brenda Rolon DNP  Working with    Written by CAROLANN Pinon on 10/29/2024  7:04 AM CDT  Seen by patient Daisy Hamm on 10/29/2024  3:06 PM

## 2024-11-04 DIAGNOSIS — Z86.718 HISTORY OF DVT (DEEP VEIN THROMBOSIS): ICD-10-CM

## 2024-11-04 DIAGNOSIS — Z79.01 CURRENT USE OF ANTICOAGULANT THERAPY: ICD-10-CM

## 2024-11-09 DIAGNOSIS — E03.9 ACQUIRED HYPOTHYROIDISM: ICD-10-CM

## 2024-11-09 NOTE — TELEPHONE ENCOUNTER
levothyroxine 125 MCG Oral Tab, Take 1 tablet (125 mcg total) by mouth before breakfast., Disp: 90 tablet, Rfl: 3

## 2024-11-13 RX ORDER — LEVOTHYROXINE SODIUM 125 UG/1
125 TABLET ORAL
Qty: 90 TABLET | Refills: 3 | OUTPATIENT
Start: 2024-11-13

## 2024-11-13 NOTE — TELEPHONE ENCOUNTER
Disp Refills Start End    levothyroxine 125 MCG Oral Tab 90 tablet 3 10/10/2024 --    Sig - Route: Take 1 tablet (125 mcg total) by mouth before breakfast. - Oral    Sent to pharmacy as: Levothyroxine Sodium 125 MCG Oral Tablet (Synthroid)    Notes to Pharmacy: This prescription was filled on 8/28/2023. Any refills authorized will be placed on file.    E-Prescribing Status: Receipt confirmed by pharmacy (10/10/2024  1:49 PM CDT)      Associated Diagnoses    Acquired hypothyroidism        Pharmacy    [id number] - [place], [place] - [address] [phone], [phone]

## 2024-11-27 ENCOUNTER — LAB ENCOUNTER (OUTPATIENT)
Dept: LAB | Age: 54
End: 2024-11-27
Attending: INTERNAL MEDICINE
Payer: COMMERCIAL

## 2024-11-27 DIAGNOSIS — R74.8 ELEVATED LIVER ENZYMES: ICD-10-CM

## 2024-11-27 LAB
ALBUMIN SERPL-MCNC: 4.3 G/DL (ref 3.2–4.8)
ALP LIVER SERPL-CCNC: 76 U/L
ALT SERPL-CCNC: 70 U/L
AST SERPL-CCNC: 84 U/L (ref ?–34)
BILIRUB DIRECT SERPL-MCNC: 0.1 MG/DL (ref ?–0.3)
BILIRUB SERPL-MCNC: 0.4 MG/DL (ref 0.3–1.2)
PROT SERPL-MCNC: 6.8 G/DL (ref 5.7–8.2)

## 2024-11-27 PROCEDURE — 36415 COLL VENOUS BLD VENIPUNCTURE: CPT

## 2024-11-27 PROCEDURE — 80076 HEPATIC FUNCTION PANEL: CPT

## 2024-12-02 DIAGNOSIS — R74.8 ELEVATED LIVER ENZYMES: Primary | ICD-10-CM

## 2024-12-27 ENCOUNTER — LAB ENCOUNTER (OUTPATIENT)
Dept: LAB | Age: 54
End: 2024-12-27
Attending: FAMILY MEDICINE
Payer: COMMERCIAL

## 2024-12-27 DIAGNOSIS — R74.8 ELEVATED LIVER ENZYMES: ICD-10-CM

## 2024-12-27 LAB
ALBUMIN SERPL-MCNC: 4.3 G/DL (ref 3.2–4.8)
ALP LIVER SERPL-CCNC: 74 U/L
ALT SERPL-CCNC: 97 U/L
AST SERPL-CCNC: 103 U/L (ref ?–34)
BILIRUB DIRECT SERPL-MCNC: 0.1 MG/DL (ref ?–0.3)
BILIRUB SERPL-MCNC: 0.5 MG/DL (ref 0.3–1.2)
PROT SERPL-MCNC: 6.9 G/DL (ref 5.7–8.2)

## 2024-12-27 PROCEDURE — 36415 COLL VENOUS BLD VENIPUNCTURE: CPT

## 2024-12-27 PROCEDURE — 80076 HEPATIC FUNCTION PANEL: CPT

## 2024-12-30 DIAGNOSIS — R74.8 ELEVATED LIVER ENZYMES: Primary | ICD-10-CM

## 2024-12-31 ENCOUNTER — TELEPHONE (OUTPATIENT)
Dept: INTERNAL MEDICINE CLINIC | Facility: CLINIC | Age: 54
End: 2024-12-31

## 2024-12-31 DIAGNOSIS — R79.89 ABNORMAL LIVER FUNCTION TEST: Primary | ICD-10-CM

## 2024-12-31 NOTE — TELEPHONE ENCOUNTER
Patient called, verified Name and . States she was advised to get repeat blood work and get an ultrasound of the liver by CAROLANN Betts. She tried to schedule an appointment but was told that there is no order and that they are booked far out. Requesting for order and wants to know when is she expected to complete the imaging. Labs scheduled on .     Liver function tests keep increasing. I am placing orders for an US of the liver, you will need to call imaging/radiology dept to schedule an appt. I am also placing additional labs for you to complete to further evaluate the liver. Please complete them soon.   Written by CAROLANN Lagunas on 2024  8:09 PM CST  Seen by patient Daisy Hamm on 2024  9:33 AM    CAROLANN Betts please advise. Pended order for review and approval if appropriate.

## 2025-01-02 ENCOUNTER — LAB ENCOUNTER (OUTPATIENT)
Dept: LAB | Age: 55
End: 2025-01-02
Attending: NURSE PRACTITIONER
Payer: COMMERCIAL

## 2025-01-02 DIAGNOSIS — R74.8 ELEVATED LIVER ENZYMES: ICD-10-CM

## 2025-01-02 LAB
CERULOPLASMIN SERPL-MCNC: 28 MG/DL (ref 20–60)
DEPRECATED HBV CORE AB SER IA-ACNC: 170 NG/ML
HAV AB SER QL IA: REACTIVE
HAV IGM SER QL: NONREACTIVE
HBV CORE AB SERPL QL IA: NONREACTIVE
HBV SURFACE AB SER QL: NONREACTIVE
HBV SURFACE AB SERPL IA-ACNC: <3.1 MIU/ML
HBV SURFACE AG SERPL QL IA: NONREACTIVE
HCV AB SERPL QL IA: NONREACTIVE
TRANSFERRIN SERPL-MCNC: 213 MG/DL (ref 250–380)

## 2025-01-02 PROCEDURE — 82390 ASSAY OF CERULOPLASMIN: CPT

## 2025-01-02 PROCEDURE — 86704 HEP B CORE ANTIBODY TOTAL: CPT

## 2025-01-02 PROCEDURE — 84466 ASSAY OF TRANSFERRIN: CPT

## 2025-01-02 PROCEDURE — 86709 HEPATITIS A IGM ANTIBODY: CPT

## 2025-01-02 PROCEDURE — 86706 HEP B SURFACE ANTIBODY: CPT

## 2025-01-02 PROCEDURE — 86708 HEPATITIS A ANTIBODY: CPT

## 2025-01-02 PROCEDURE — 82728 ASSAY OF FERRITIN: CPT

## 2025-01-02 PROCEDURE — 83516 IMMUNOASSAY NONANTIBODY: CPT

## 2025-01-02 PROCEDURE — 84165 PROTEIN E-PHORESIS SERUM: CPT

## 2025-01-02 PROCEDURE — 80503 PATH CLIN CONSLTJ SF 5-20: CPT

## 2025-01-02 PROCEDURE — 87340 HEPATITIS B SURFACE AG IA: CPT

## 2025-01-02 PROCEDURE — 86803 HEPATITIS C AB TEST: CPT

## 2025-01-02 PROCEDURE — 86334 IMMUNOFIX E-PHORESIS SERUM: CPT

## 2025-01-02 PROCEDURE — 36415 COLL VENOUS BLD VENIPUNCTURE: CPT

## 2025-01-02 PROCEDURE — 82784 ASSAY IGA/IGD/IGG/IGM EACH: CPT

## 2025-01-03 LAB — ACTIN SMOOTH MUSCLE AB: 5 UNITS

## 2025-01-06 LAB
ALBUMIN SERPL ELPH-MCNC: 3.78 G/DL (ref 3.75–5.21)
ALBUMIN/GLOB SERPL: 1.44 {RATIO} (ref 1–2)
ALPHA1 GLOB SERPL ELPH-MCNC: 0.27 G/DL (ref 0.19–0.46)
ALPHA2 GLOB SERPL ELPH-MCNC: 0.74 G/DL (ref 0.48–1.05)
B-GLOBULIN SERPL ELPH-MCNC: 0.67 G/DL (ref 0.68–1.23)
GAMMA GLOB SERPL ELPH-MCNC: 0.94 G/DL (ref 0.62–1.7)
IGA SERPL-MCNC: 198.2 MG/DL (ref 40–350)
IGM SERPL-MCNC: 171 MG/DL (ref 50–300)
IMMUNOGLOBULIN PNL SER-MCNC: 952 MG/DL (ref 650–1600)
PROT SERPL-MCNC: 6.4 G/DL (ref 5.7–8.2)

## 2025-01-08 ENCOUNTER — TELEPHONE (OUTPATIENT)
Dept: INTERNAL MEDICINE CLINIC | Facility: CLINIC | Age: 55
End: 2025-01-08

## 2025-01-08 DIAGNOSIS — R74.8 ELEVATED LIVER ENZYMES: Primary | ICD-10-CM

## 2025-01-22 ENCOUNTER — PATIENT MESSAGE (OUTPATIENT)
Dept: INTERNAL MEDICINE CLINIC | Facility: CLINIC | Age: 55
End: 2025-01-22

## 2025-02-01 ENCOUNTER — HOSPITAL ENCOUNTER (OUTPATIENT)
Dept: ULTRASOUND IMAGING | Age: 55
Discharge: HOME | End: 2025-02-01
Attending: NURSE PRACTITIONER
Payer: COMMERCIAL

## 2025-02-01 DIAGNOSIS — R79.89 ABNORMAL LIVER FUNCTION TEST: ICD-10-CM

## 2025-02-01 PROCEDURE — 76705 ECHO EXAM OF ABDOMEN: CPT | Performed by: NURSE PRACTITIONER

## 2025-02-14 NOTE — H&P
[facility] - Gastroenterology                                                                                                                 Requesting physician or provider: Maria Fernanda Ribera MD    Chief Complaint   Patient presents with    Abnormal Liver Enzymes     Had Colonoscopy done about 6 yrs ago        HPI:   Daisy Hamm is a 54 year old year-old female with history of RA, Non-hodgkin lymphoma- stem cell transplant 10 yrs ago, latent TB, hx of blood clots in legs, lymphedema.     Pt has had up trending AST and ALT. Pt denies hx of excessive drinking, states when last drink was at least 2-5 yrs ago. When she did drink she only had 1 drink every 2 weeks. Denies family hx of liver ca. Pt states that she started new latent TB meds in July, up trending liver enzymes started in September.    Additionally, pt states since last fall she has had been lower abdominal pain/cramping after meals. Pt states that pain is relieved with pepto bismol and after having bm. Endorses spicy foods make it worse. Mild loss of appetite.   Reports bm daily, occasionally black due to pepto, stools seem heavier. Denies straining. Reports increased pain/cramping almost daily.   Pt consumes green tea every day.     Significant GI symptoms include: melena, hematochezia, hematemesis, abd surgery, changes in stool or bowel habits, N/V/D, weight gain/loss    Prior endoscopies:  Colonoscopy 02/18/2019  1. Diverticulosis left colon currently uncomplicated  2. Otherwise normal screening colonoscopy to the terminal ileum    Recommendations:  1. High-fiber diet.  2. In the absence of any new symptoms or signs, repeat screening colonoscopy in 10 years.     Soc:  -denies smoking  -denies Etoh    Wt Readings from Last 6 Encounters:   02/17/25 239 lb 3.2 oz (108.5 kg)   10/28/24 237 lb 12.8 oz (107.9 kg)   10/14/24 236 lb 8 oz (107.3 kg)    08/14/24 239 lb (108.4 kg)   08/12/24 238 lb 3.2 oz (108 kg)   08/09/24 246 lb (111.6 kg)        History, Medications, Allergies, ROS:      Past Medical History:    Abnormal mammogram of left breast    Anaplastic large cell lymphoma, unspecified site, extranodal and solid organ sites    Anxiety state    Depression    DVT (deep venous thrombosis) (HCC)    High cholesterol    History of blood transfusion    Hypothyroidism    Hypothyroidism, primary    Latent tuberculosis    Other malignant lymphomas of intra-abdominal lymph nodes    Personal history of antineoplastic chemotherapy    Port catheter in place    Pre-diabetes    Rheumatoid arthritis (HCC)    Sleep apnea    NO DEVICE USED.    Swelling of limb    Visual impairment    GLASSES      Past Surgical History:   Procedure Laterality Date    Bone marrow aspirate &biopsy  05/15/2014    Bone marrow/blood-derived peripheral stem cell transplantat; allogeneic donor lymphocyte infusions  2014    stem cell transplant    Colonoscopy N/A 02/18/2019    Procedure: COLONOSCOPY;  Surgeon: Larry Ladd MD;  Location: [facility] ENDOSCOPY    Hernia surgery  11/1999    Umbilical hernia repair    Ofe biopsy stereo nodule 1 site left (cpt=19081)      2015    Ofe biopsy stereo nodule 2 site bilat (cpt=19081/98961)  08/19/2015    Other surgical history      Injection Tendon Sheath, Ligament    Other surgical history Bilateral 06/2024    nerve surgery in arms    Tubal ligation  2001      Family Hx:   Family History   Problem Relation Age of Onset    Other (Other) Mother         Rheumatoid Arthritis    Diabetes Father     Heart Disease Father     Thyroid disease Sister     Other (lupus) Sister     Other (sjogren's) Sister     Breast Cancer Sister 47    Other (Other) Brother         Hemochromatosis    Melanoma Maternal Grandfather     Breast Cancer Paternal Aunt 65    Heart Disease Other         Grandparents      Social History:   Social History     Socioeconomic History    Marital  status:    Tobacco Use    Smoking status: Never    Smokeless tobacco: Never   Vaping Use    Vaping status: Never Used   Substance and Sexual Activity    Alcohol use: Not Currently    Drug use: No    Sexual activity: Yes     Partners: Male     Birth control/protection: Tubal Ligation   Other Topics Concern    Caffeine Concern Yes     Comment: Soda, Coffee - 2 cups daily        Medications (Active prior to today's visit):  Current Outpatient Medications   Medication Sig Dispense Refill    rivaroxaban (XARELTO) 10 MG Oral Tab Take 1 tablet (10 mg total) by mouth daily with food. 90 tablet 3    levothyroxine 125 MCG Oral Tab Take 1 tablet (125 mcg total) by mouth before breakfast. 90 tablet 3    rosuvastatin 40 MG Oral Tab TAKE 1 TABLET BY MOUTH EVERY EVENING 90 tablet 3    metFORMIN  MG Oral Tablet 24 Hr Take 1 tablet (500 mg total) by mouth daily. 90 tablet 3    Pyridoxine HCl (B-6) 50 MG Oral Tab Take 1 tablet (50 mg total) by mouth daily. 90 tablet 3    isoniazid 300 MG Oral Tab Take 1 tablet (300 mg total) by mouth daily.      Cholecalciferol (VITAMIN D) 1000 units Oral Tab Take 1,000 Units by mouth daily.           Allergies:  Allergies[1]    ROS:   CONSTITUTIONAL:  negative for fevers, rigors  EYES:  negative for diplopia   RESPIRATORY:  negative for severe shortness of breath  CARDIOVASCULAR:  negative for crushing sub-sternal chest pain  GASTROINTESTINAL:  see HPI  GENITOURINARY:  negative for dysuria or gross hematuria  INTEGUMENT/BREAST:  SKIN:  negative for jaundice   ALLERGIC/IMMUNOLOGIC:  negative for hay fever  ENDOCRINE:  negative for cold intolerance and heat intolerance  MUSCULOSKELETAL:  negative for joint effusion/severe erythema  BEHAVIOR/PSYCH:  negative for psychotic behavior      PHYSICAL EXAM:   Blood pressure 103/71, pulse 89, height 5' 6\" (1.676 m), weight 239 lb 3.2 oz (108.5 kg), not currently breastfeeding.    Gen- Patient appears comfortable and in no acute discomfort  HEENT:  the sclera appears anicteric, oropharynx clear, mucus membranes appear moist  CV- regular rate and rhythm, the extremities are warm and well perfused   Lung- Moves air well; No labored breathing  Abdomen- soft, non-tender exam in all quadrants without rigidity or guarding, non-distended, no abnormal bowel sounds noted, no masses are palpated  Skin- No jaundice  Ext: no cyanosis, clubbing or edema is evident.   Neuro- Alert and interactive, and gross movements of extremities normal  Psych - appropriate, non-agitated    Labs/Imaging:     Component      Latest Ref Rng 1/2/2025   Hepatitis A Virus Ab, Total      Nonreactive   Reactive !    HBSAg Screen      Nonreactive   Nonreactive    HEPATITIS B CORE AB, TOTAL      Nonreactive   Nonreactive    HEPATITIS B SURFACE AB QUAL      Reactive   Nonreactive !    HEPATITIS B SURFACE AB QUANT      mIU/mL <3.10    HCV AB      Nonreactive   Nonreactive       Legend:  ! Abnormal  Transferrin  250 - 380 mg/dL 213 Low          Component  Ref Range & Units 1/2/25  8:39 AM   Total Protein  5.7 - 8.2 g/dL 6.4   Albumin  3.75 - 5.21 g/dL 3.78   Alpha-1-Globulins  0.19 - 0.46 g/dL 0.27   Alpha-2-Globulins  0.48 - 1.05 g/dL 0.74   Beta Globulins  0.68 - 1.23 g/dL 0.67 Low    Gamma Globulins  0.62 - 1.70 g/dL 0.94   Albumin/Globulin Ratio  1.00 - 2.00 1.44          Component      Latest Ref Rng 9/30/2024 10/12/2024 10/23/2024 11/27/2024 12/27/2024   ALT (SGPT)      10 - 49 U/L 69 (H)  62 (H)  63 (H)  70 (H)  97 (H)    AST (SGOT)      <34 U/L 69 (H)  65 (H)  76 (H)  84 (H)  103 (H)    ALKALINE PHOSPHATASE      46 - 118 U/L 72  72  69  76  74    Total Bilirubin      0.3 - 1.2 mg/dL 0.4  0.4  0.5  0.4  0.5    PROTEIN, TOTAL      5.7 - 8.2 g/dL 6.8  6.8  6.8  6.8  6.9    Albumin      3.75 - 5.21 g/dL 4.2  4.3  4.3  4.3  4.3    Bilirubin, Direct      <=0.3 mg/dL 0.2    0.1  0.1         Legend:  (H) High  US LIVER (CPT=76705)    Result Date: 2/1/2025  CONCLUSION:  1. Fatty liver with probable  focal fatty sparing at the gallbladder fossa. 2. Otherwise, unremarkable sonographic assessment of the right upper quadrant.   elm-remote  Dictated by (CST): Elmo Alba MD on 2/01/2025 at 2:37 PM     Finalized by (CST): Elmo Alba MD on 2/01/2025 at 2:39 PM            ASSESSMENT/PLAN:   Daisy Hamm is a 54 year old year-old female with history of RA, Non-hodgkin lymphoma- stem cell transplant 10 yrs ago, latent TB, hx of blood clots in legs, lymphedema.     Pt has had up trending AST and ALT. Pt denies hx of excessive drinking, states when last drink was at least 2-5 yrs ago. When she did drink she only had 1 drink every 2 weeks. Denies family hx of liver ca. Pt states that she started new latent TB meds in July, up trending liver enzymes started in September.    Additionally, pt states since last fall she has had been lower abdominal pain/cramping after meals. Pt states that pain is relieved with pepto bismol and after having bm. Endorses spicy foods make it worse. Mild loss of appetite.   Reports bm daily, occasionally black due to pepto, stools seem heavier. Denies straining. Reports increased pain/cramping almost daily.   Pt consumes green tea every day.     # elevated liver enzymes/fatty liver  - avoid green tea  - low fat/low red meat   - work up for autoimmune liver in 1 week  - continue to monitor liver enzymes at least once a month until hepatology appointment  - hepatology referral  - report er yellowing/jaundice, increased weakness, hematemesis, and for other abnormal changes    # lower abdominal pain  - maybe a result of diverticulosis  - increase fiber intake trial metamucil, miralax or benifiber  - come to er if blood in stool, rectal bleeding, increased lower abdominal pain      Orders This Visit:  Orders Placed This Encounter   Procedures    AFP, Tumor Marker, Serum    Tissue Transglutaminase Ab, IgA    Mitochondrial (M2) Antibody    Lipid Panel    GGT (Gamma Glutamyl  Transpeptidase)    HCV Antibody    Hepatic Function Panel (7)    Alpha-1-antirypsin, serum    Immunoglobulin A/G/M, Quant       Meds This Visit:  Requested Prescriptions      No prescriptions requested or ordered in this encounter       Imaging & Referrals:  HEPATOLOGY - INTERNAL         Tere Ferrell, CAROLANN   2/17/2025          [1]   Allergies  Allergen Reactions    Aspirin OTHER (SEE COMMENTS)     Other reaction(s): GI problems

## 2025-02-17 ENCOUNTER — OFFICE VISIT (OUTPATIENT)
Facility: CLINIC | Age: 55
End: 2025-02-17
Payer: COMMERCIAL

## 2025-02-17 VITALS
HEART RATE: 89 BPM | WEIGHT: 239.19 LBS | HEIGHT: 66 IN | SYSTOLIC BLOOD PRESSURE: 103 MMHG | DIASTOLIC BLOOD PRESSURE: 71 MMHG | BODY MASS INDEX: 38.44 KG/M2

## 2025-02-17 DIAGNOSIS — Z87.19 HISTORY OF DIVERTICULOSIS: ICD-10-CM

## 2025-02-17 DIAGNOSIS — R74.8 ELEVATED LIVER ENZYMES: Primary | ICD-10-CM

## 2025-02-17 DIAGNOSIS — R10.30 LOWER ABDOMINAL PAIN: ICD-10-CM

## 2025-02-17 DIAGNOSIS — K76.0 FATTY LIVER: ICD-10-CM

## 2025-02-17 NOTE — PATIENT INSTRUCTIONS
# elevated liver enzymes/fatty liver  - avoid green tea  - low fat/low red meat   - work up for autoimmune liver in 1 week  - continue to monitor liver enzymes at least once a month until hepatology appointment  - hepatology referral  - report er yellowing/jaundice, increased weakness, hematemesis, and for other abnormal changes    # lower abdominal pain  - maybe a result of diverticulosis  - increase fiber intake trial metamucil, miralax or benifiber  - come to er if blood in stool, rectal bleeding, increased lower abdominal pain

## 2025-02-22 ENCOUNTER — LAB ENCOUNTER (OUTPATIENT)
Dept: LAB | Age: 55
End: 2025-02-22
Payer: COMMERCIAL

## 2025-02-22 DIAGNOSIS — R74.8 ELEVATED LIVER ENZYMES: ICD-10-CM

## 2025-02-22 DIAGNOSIS — K76.0 FATTY LIVER: ICD-10-CM

## 2025-02-22 LAB
AFP-TM SERPL-MCNC: 8.2 NG/ML
ALBUMIN SERPL-MCNC: 4 G/DL (ref 3.2–4.8)
ALP LIVER SERPL-CCNC: 106 U/L
ALT SERPL-CCNC: 304 U/L
AST SERPL-CCNC: 361 U/L (ref ?–34)
BILIRUB DIRECT SERPL-MCNC: 0.3 MG/DL (ref ?–0.3)
BILIRUB SERPL-MCNC: 0.7 MG/DL (ref 0.3–1.2)
CHOLEST SERPL-MCNC: 148 MG/DL (ref ?–200)
FASTING PATIENT LIPID ANSWER: YES
GGT SERPL-CCNC: 148 U/L
HCV AB SERPL QL IA: NONREACTIVE
HDLC SERPL-MCNC: 58 MG/DL (ref 40–59)
IGA SERPL-MCNC: 213.3 MG/DL (ref 40–350)
IGM SERPL-MCNC: 197.7 MG/DL (ref 50–300)
IMMUNOGLOBULIN PNL SER-MCNC: 977 MG/DL (ref 650–1600)
LDLC SERPL CALC-MCNC: 78 MG/DL (ref ?–100)
NONHDLC SERPL-MCNC: 90 MG/DL (ref ?–130)
PROT SERPL-MCNC: 6.6 G/DL (ref 5.7–8.2)
TRIGL SERPL-MCNC: 58 MG/DL (ref 30–149)
VLDLC SERPL CALC-MCNC: 9 MG/DL (ref 0–30)

## 2025-02-22 PROCEDURE — 86803 HEPATITIS C AB TEST: CPT

## 2025-02-22 PROCEDURE — 82977 ASSAY OF GGT: CPT

## 2025-02-22 PROCEDURE — 36415 COLL VENOUS BLD VENIPUNCTURE: CPT

## 2025-02-22 PROCEDURE — 82103 ALPHA-1-ANTITRYPSIN TOTAL: CPT

## 2025-02-22 PROCEDURE — 80061 LIPID PANEL: CPT

## 2025-02-22 PROCEDURE — 82105 ALPHA-FETOPROTEIN SERUM: CPT

## 2025-02-22 PROCEDURE — 83516 IMMUNOASSAY NONANTIBODY: CPT

## 2025-02-22 PROCEDURE — 80076 HEPATIC FUNCTION PANEL: CPT

## 2025-02-22 PROCEDURE — 82784 ASSAY IGA/IGD/IGG/IGM EACH: CPT

## 2025-02-22 PROCEDURE — 86364 TISS TRNSGLTMNASE EA IG CLAS: CPT

## 2025-02-23 LAB — A-1-ANTITRYPSIN: 162 MG/DL

## 2025-02-24 LAB — M2 MITOCHONDRIAL AB: <20 UNITS

## 2025-02-25 ENCOUNTER — TELEPHONE (OUTPATIENT)
Facility: CLINIC | Age: 55
End: 2025-02-25

## 2025-02-25 DIAGNOSIS — K76.0 FATTY LIVER: ICD-10-CM

## 2025-02-25 DIAGNOSIS — R77.2 ELEVATED AFP: ICD-10-CM

## 2025-02-25 DIAGNOSIS — R74.8 ELEVATED SERUM GGT LEVEL: ICD-10-CM

## 2025-02-25 DIAGNOSIS — R79.89 ELEVATED LIVER FUNCTION TESTS: Primary | ICD-10-CM

## 2025-02-25 DIAGNOSIS — K76.0 FATTY LIVER: Primary | ICD-10-CM

## 2025-02-25 DIAGNOSIS — R79.89 ELEVATED LFTS: ICD-10-CM

## 2025-02-25 LAB — TTG IGA SER-ACNC: 0.6 U/ML (ref ?–7)

## 2025-02-25 NOTE — TELEPHONE ENCOUNTER
Patient states the soonest date she can get in to see Dr. Brian is March 17th and wondering if there can be a sooner date it should be a stat referral instead of routine please follow up

## 2025-02-25 NOTE — TELEPHONE ENCOUNTER
RN called pt, RN inquiring which provider she wanted to see for hepatogly. She states she has PPO and can go anywhere for hepatology. She states she called Dr. Brian's office and was told there was no referral in place.    RN faxed hepatology referral to Dr. Brian's office. Received confirmation that fax was transmitted successfully.  Fax #[phone]    [employer] message sent to pt informing referral was faxed.

## 2025-02-25 NOTE — TELEPHONE ENCOUNTER
Pt contacted to review lab results as they are up trending. I messaged patient on my chart as well.

## 2025-02-26 ENCOUNTER — TELEPHONE (OUTPATIENT)
Facility: CLINIC | Age: 55
End: 2025-02-26

## 2025-02-26 DIAGNOSIS — K76.0 FATTY LIVER: ICD-10-CM

## 2025-02-26 DIAGNOSIS — R79.89 ELEVATED LFTS: Primary | ICD-10-CM

## 2025-02-26 NOTE — TELEPHONE ENCOUNTER
Referral was faxed to Hepatology at [facility] Fax [phone]  Successful fax confirmation received 2/26/2025 at 0907

## 2025-02-26 NOTE — TELEPHONE ENCOUNTER
Contacted pt to review results of blood work. I have discussed the case with Dr. Christie. As advised pt to conduct weekly, hepatic panel for trending until pt is able to be seen by hepatology. Pt concerned about cancer and would like to be seen as soon as possible hepatology. Pt reassured that we were trying to help her find an appointment with hepatology as soon as possible. In the meantime pt to avoid, green tea, alcohol, and tylenol.   Reviewed with pt when to report to the ER in conditions of increased yellowing skin or yellow around the eyes, alerted mental status.

## 2025-02-26 NOTE — TELEPHONE ENCOUNTER
RN called and spoke to pt, informed her that a referral was faxed to [facility] hepatology this morning, referral included that it was high priority.     Informed pt that I spoke with Dr. Brian's office this morning and tentatively scheduled her for their next available appt on 5/15/25 at 10 am. Pt was placed on waitlist there. Instructed pt to call Dr. Brian's office to verify location of appt. Pt agreeable.    Informed her that [facility] said that it will take 3 days to process referral. They stated that they try to schedule pt for clinic in 2 weeks if referral states it is high priority. RN provided two phone numbers to  Hepatology and instructed pt to call Friday morning to schedule appt. Pt agreeable and verbalized understanding.    Pt states she had a stem cell transplant at [facility] 10 years ago. Discussed that [place] has a hepatology service and she may be able to go there is unable to get in at [facility] and Dr. Brian/SHRUTHI in a timely fashion. Instructed her to call GI office for any further assistance is needed. Pt states she will have weekly follow up/labs until seen by hepatology.

## 2025-03-01 ENCOUNTER — LAB ENCOUNTER (OUTPATIENT)
Dept: LAB | Age: 55
End: 2025-03-01
Payer: COMMERCIAL

## 2025-03-01 DIAGNOSIS — R79.89 ELEVATED LFTS: ICD-10-CM

## 2025-03-01 DIAGNOSIS — K76.0 FATTY LIVER: ICD-10-CM

## 2025-03-01 LAB
ALBUMIN SERPL-MCNC: 4 G/DL (ref 3.2–4.8)
ALBUMIN/GLOB SERPL: 1.7 {RATIO} (ref 1–2)
ALP LIVER SERPL-CCNC: 117 U/L
ALT SERPL-CCNC: 397 U/L
ANION GAP SERPL CALC-SCNC: 8 MMOL/L (ref 0–18)
AST SERPL-CCNC: 526 U/L (ref ?–34)
BILIRUB DIRECT SERPL-MCNC: 0.2 MG/DL (ref ?–0.3)
BILIRUB SERPL-MCNC: 0.7 MG/DL (ref 0.3–1.2)
BUN BLD-MCNC: 7 MG/DL (ref 9–23)
BUN/CREAT SERPL: 10.6 (ref 10–20)
CALCIUM BLD-MCNC: 8.9 MG/DL (ref 8.7–10.4)
CHLORIDE SERPL-SCNC: 109 MMOL/L (ref 98–112)
CO2 SERPL-SCNC: 27 MMOL/L (ref 21–32)
CREAT BLD-MCNC: 0.66 MG/DL
EGFRCR SERPLBLD CKD-EPI 2021: 103 ML/MIN/1.73M2 (ref 60–?)
FASTING STATUS PATIENT QL REPORTED: YES
GLOBULIN PLAS-MCNC: 2.4 G/DL (ref 2–3.5)
GLUCOSE BLD-MCNC: 92 MG/DL (ref 70–99)
INR BLD: 1.31 (ref 0.8–1.2)
OSMOLALITY SERPL CALC.SUM OF ELEC: 296 MOSM/KG (ref 275–295)
POTASSIUM SERPL-SCNC: 4.1 MMOL/L (ref 3.5–5.1)
PROT SERPL-MCNC: 6.4 G/DL (ref 5.7–8.2)
PROTHROMBIN TIME: 17.1 SECONDS (ref 11.6–14.8)
SODIUM SERPL-SCNC: 144 MMOL/L (ref 136–145)

## 2025-03-01 PROCEDURE — 80053 COMPREHEN METABOLIC PANEL: CPT

## 2025-03-01 PROCEDURE — 36415 COLL VENOUS BLD VENIPUNCTURE: CPT

## 2025-03-01 PROCEDURE — 82248 BILIRUBIN DIRECT: CPT

## 2025-03-01 PROCEDURE — 85610 PROTHROMBIN TIME: CPT

## 2025-03-03 ENCOUNTER — TELEPHONE (OUTPATIENT)
Facility: CLINIC | Age: 55
End: 2025-03-03

## 2025-03-03 DIAGNOSIS — R77.2 ELEVATED AFP: ICD-10-CM

## 2025-03-03 DIAGNOSIS — K76.0 FATTY LIVER: ICD-10-CM

## 2025-03-03 DIAGNOSIS — R74.8 ELEVATED LIVER ENZYMES: Primary | ICD-10-CM

## 2025-03-03 NOTE — TELEPHONE ENCOUNTER
Tere,   I did not call pt back yet, I wanted to send to you first. Please let me know if I can do anything to assist.  Thanks,  Lolis

## 2025-03-03 NOTE — TELEPHONE ENCOUNTER
Pt lfts continue to up trend. Ordered a liver biopsy for further testing. Pt to try to have biopsy by this week. Continue to monitor lfts until hepatology consult.

## 2025-03-03 NOTE — TELEPHONE ENCOUNTER
Tere,   I spoke to IR, the order needs to be written differently. Please call GI RN to discuss further.   Thanks!  Lolis

## 2025-03-03 NOTE — TELEPHONE ENCOUNTER
Lolis,  I tried to call her again. I missed her again. I'm not sure if you can hold the call if she calls back so I can talk to her.   But things to discuss:  - liver biopsy- try to schedule this week  - xarelto- the reason why she is on it? Any way of holding it to see if numbers come down?  - continued monitoring of labs  - at this time it is not ca  Thank you,  Tere

## 2025-03-03 NOTE — TELEPHONE ENCOUNTER
----- Message from Loreta Avery sent at 4/26/2024 12:10 PM CDT -----  Contact: Bertrand  Type:  Needs Medical Advice    Who Called: Daughter Bertrand   Symptoms (please be specific): daughter sts she fell and sts she is in the hosp and they will take xrays etc but pt just wanted to let  know what was going on. She sts she is in a lot of pain and Is waiting to be seen, they have no beds available and daughter would like to know if dr can do anything for pt to be seen now. Please call  for more info.    Would the patient rather a call back or a response via [facility]? call  Best Call Back Number: [phone]  Additional Information: please advise and thank you.   Patient returning missed call. Please call.

## 2025-03-03 NOTE — TELEPHONE ENCOUNTER
Patient called to inform the office that the earliest appointment she was able to get with the Heptologist is April 24th, patient wanted to know if she can wait that long.

## 2025-03-04 ENCOUNTER — TELEPHONE (OUTPATIENT)
Age: 55
End: 2025-03-04

## 2025-03-04 ENCOUNTER — TELEPHONE (OUTPATIENT)
Dept: INTERVENTIONAL RADIOLOGY/VASCULAR | Facility: HOSPITAL | Age: 55
End: 2025-03-04

## 2025-03-04 ENCOUNTER — TELEPHONE (OUTPATIENT)
Facility: CLINIC | Age: 55
End: 2025-03-04

## 2025-03-04 DIAGNOSIS — K76.0 FATTY LIVER: ICD-10-CM

## 2025-03-04 DIAGNOSIS — R74.8 ELEVATED LIVER ENZYMES: Primary | ICD-10-CM

## 2025-03-04 NOTE — TELEPHONE ENCOUNTER
Tere Ferrell, Lolis Hernandez, EWELINA; P Em Gi Clinical Staff  Lolis,  Pt LFTs continue to go up. I am ordering a liver biopsy. Can you see if we can get her in this week.  Did we get her in to see hepatology?  Thank you,  Tere

## 2025-03-04 NOTE — TELEPHONE ENCOUNTER
Per APN, Marisela RAMON from IR will have  contact pt to schedule liver biopsy. Plan is to get pt scheduled for this week. Pt currently scheduled for a hepatology appt in late April but unsure of which facility ([facility]?). APN states she will be calling pt again later today, nothing else needed from nursing at this time per APN.      Tere Ferrell, APRN1 hour ago (9:39 AM)     SG  Pt contacted to review lab results. LFTs continue to up trend. Liver biopsy ordered. Please complete this week. Continue to monitor PT/INR and LFTs weekly.   Hold isoniazid as this may be contributing to elevated LFTs.          Note

## 2025-03-04 NOTE — TELEPHONE ENCOUNTER
Pt contacted to review lab results. LFTs continue to up trend. Liver biopsy ordered. Please complete this week. Continue to monitor PT/INR and LFTs weekly.   Hold isoniazid as this may be contributing to elevated LFTs.

## 2025-03-04 NOTE — PAT NURSING NOTE
Called and left a voicemail to call back. This is an attempt to schedule patient for a Biopsy as ordered by her physician.

## 2025-03-04 NOTE — TELEPHONE ENCOUNTER
Received a call from EWELINA Paige from . She stated the patient is scheduled for a liver biopsy on 3/24/2025 and is currently taking Xarelto. She's asking for instructions on holding the patient's anticoagulation and needs a note documented.    Spoke with Dr. Tolbert and he stated, \"Patient should hold her Xarelto 2 days before her procedure, hold the day of the procedure, hold the day after the procedure; then resume her Xarelto 2 days after her procedure\".

## 2025-03-06 ENCOUNTER — TELEPHONE (OUTPATIENT)
Facility: CLINIC | Age: 55
End: 2025-03-06

## 2025-03-06 ENCOUNTER — PATIENT MESSAGE (OUTPATIENT)
Facility: CLINIC | Age: 55
End: 2025-03-06

## 2025-03-06 NOTE — TELEPHONE ENCOUNTER
Contacted pt. IR biopsy for liver biopsy rescheduled 3/14/25 at 1030 am. Pt continues to hold isoniazid. Will have repeat labs on Saturday.

## 2025-03-07 NOTE — TELEPHONE ENCOUNTER
Patient is requesting a call back regarding message.   She was informed to stop blood thinner because Biopsy was scheduled for 3/14/25.  After checking MyChart, Biopsy is scheduled for 3/11/25.  She needs to know when to stop blood thinners.   She also states that she was not given a time for procedure.      Patient states that labs were ordered but she does not know when to have them drawn.    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      Please call

## 2025-03-07 NOTE — TELEPHONE ENCOUNTER
Spoke to patient and let her know per appt desk her biopsy is scheduled for 3/11/25 at 11:30. I asked her to call IR on Monday to verify date/time. Advised to still get labs drawn this Saturday. Per notes patient should hold Xarelto 2 days prior to biopsy.     Patient verbalized understanding. She will call us back on Monday if any questions/concerns.

## 2025-03-08 ENCOUNTER — LAB ENCOUNTER (OUTPATIENT)
Dept: LAB | Age: 55
End: 2025-03-08
Attending: INTERNAL MEDICINE
Payer: COMMERCIAL

## 2025-03-08 DIAGNOSIS — R77.2 ELEVATED AFP: ICD-10-CM

## 2025-03-08 DIAGNOSIS — R74.8 ELEVATED LIVER ENZYMES: ICD-10-CM

## 2025-03-08 DIAGNOSIS — Z01.818 PRE-OP TESTING: ICD-10-CM

## 2025-03-08 DIAGNOSIS — K76.0 FATTY LIVER: ICD-10-CM

## 2025-03-08 LAB
ALBUMIN SERPL-MCNC: 4 G/DL (ref 3.2–4.8)
ALP LIVER SERPL-CCNC: 136 U/L
ALT SERPL-CCNC: 411 U/L
AST SERPL-CCNC: 506 U/L (ref ?–34)
BASOPHILS # BLD AUTO: 0.07 X10(3) UL (ref 0–0.2)
BASOPHILS NFR BLD AUTO: 1 %
BILIRUB DIRECT SERPL-MCNC: 0.3 MG/DL (ref ?–0.3)
BILIRUB SERPL-MCNC: 0.7 MG/DL (ref 0.3–1.2)
DEPRECATED RDW RBC AUTO: 46.1 FL (ref 35.1–46.3)
EOSINOPHIL # BLD AUTO: 0.14 X10(3) UL (ref 0–0.7)
EOSINOPHIL NFR BLD AUTO: 2 %
ERYTHROCYTE [DISTWIDTH] IN BLOOD BY AUTOMATED COUNT: 14.4 % (ref 11–15)
HCT VFR BLD AUTO: 47 %
HGB BLD-MCNC: 15.8 G/DL
IMM GRANULOCYTES # BLD AUTO: 0.01 X10(3) UL (ref 0–1)
IMM GRANULOCYTES NFR BLD: 0.1 %
INR BLD: 1.32 (ref 0.8–1.2)
LYMPHOCYTES # BLD AUTO: 2.3 X10(3) UL (ref 1–4)
LYMPHOCYTES NFR BLD AUTO: 33.4 %
MCH RBC QN AUTO: 28.9 PG (ref 26–34)
MCHC RBC AUTO-ENTMCNC: 33.6 G/DL (ref 31–37)
MCV RBC AUTO: 85.9 FL
MONOCYTES # BLD AUTO: 0.72 X10(3) UL (ref 0.1–1)
MONOCYTES NFR BLD AUTO: 10.4 %
NEUTROPHILS # BLD AUTO: 3.65 X10 (3) UL (ref 1.5–7.7)
NEUTROPHILS # BLD AUTO: 3.65 X10(3) UL (ref 1.5–7.7)
NEUTROPHILS NFR BLD AUTO: 53.1 %
PLATELET # BLD AUTO: 195 10(3)UL (ref 150–450)
PROT SERPL-MCNC: 6.7 G/DL (ref 5.7–8.2)
PROTHROMBIN TIME: 17.1 SECONDS (ref 11.6–14.8)
RBC # BLD AUTO: 5.47 X10(6)UL
WBC # BLD AUTO: 6.9 X10(3) UL (ref 4–11)

## 2025-03-08 PROCEDURE — 36415 COLL VENOUS BLD VENIPUNCTURE: CPT

## 2025-03-08 PROCEDURE — 85025 COMPLETE CBC W/AUTO DIFF WBC: CPT

## 2025-03-08 PROCEDURE — 85610 PROTHROMBIN TIME: CPT

## 2025-03-08 PROCEDURE — 80076 HEPATIC FUNCTION PANEL: CPT

## 2025-03-10 ENCOUNTER — TELEPHONE (OUTPATIENT)
Facility: CLINIC | Age: 55
End: 2025-03-10

## 2025-03-10 DIAGNOSIS — R79.89 ELEVATED LFTS: Primary | ICD-10-CM

## 2025-03-10 DIAGNOSIS — K76.0 FATTY LIVER: ICD-10-CM

## 2025-03-10 NOTE — TELEPHONE ENCOUNTER
Pt contacted to discuss up trending LFTs. Pt has liver biopsy scheduled for later this week. Pt to continue holding isoniazid. Pt to re-check labs on Saturday.   I will try to call pt back later today

## 2025-03-11 ENCOUNTER — HOSPITAL ENCOUNTER (OUTPATIENT)
Dept: INTERVENTIONAL RADIOLOGY/VASCULAR | Facility: HOSPITAL | Age: 55
Discharge: HOME | End: 2025-03-11
Admitting: RADIOLOGY
Payer: COMMERCIAL

## 2025-03-11 VITALS
TEMPERATURE: 97 F | HEIGHT: 66 IN | OXYGEN SATURATION: 97 % | HEART RATE: 67 BPM | SYSTOLIC BLOOD PRESSURE: 108 MMHG | BODY MASS INDEX: 38.41 KG/M2 | RESPIRATION RATE: 20 BRPM | DIASTOLIC BLOOD PRESSURE: 73 MMHG | WEIGHT: 239 LBS

## 2025-03-11 DIAGNOSIS — K76.0 FATTY LIVER: ICD-10-CM

## 2025-03-11 DIAGNOSIS — R74.8 ELEVATED LIVER ENZYMES: ICD-10-CM

## 2025-03-11 LAB — GLUCOSE BLDC GLUCOMTR-MCNC: 78 MG/DL (ref 70–99)

## 2025-03-11 PROCEDURE — 88307 TISSUE EXAM BY PATHOLOGIST: CPT

## 2025-03-11 PROCEDURE — 99152 MOD SED SAME PHYS/QHP 5/>YRS: CPT | Performed by: RADIOLOGY

## 2025-03-11 PROCEDURE — 88313 SPECIAL STAINS GROUP 2: CPT

## 2025-03-11 PROCEDURE — 0FB03ZX EXCISION OF LIVER, PERCUTANEOUS APPROACH, DIAGNOSTIC: ICD-10-PCS | Performed by: RADIOLOGY

## 2025-03-11 PROCEDURE — 47000 NEEDLE BIOPSY OF LIVER PERQ: CPT | Performed by: RADIOLOGY

## 2025-03-11 PROCEDURE — 82962 GLUCOSE BLOOD TEST: CPT

## 2025-03-11 PROCEDURE — 76942 ECHO GUIDE FOR BIOPSY: CPT | Performed by: RADIOLOGY

## 2025-03-11 RX ORDER — SODIUM CHLORIDE 9 MG/ML
INJECTION, SOLUTION INTRAVENOUS CONTINUOUS
Status: DISCONTINUED | OUTPATIENT
Start: 2025-03-11 | End: 2025-03-11

## 2025-03-11 RX ORDER — MIDAZOLAM HYDROCHLORIDE 1 MG/ML
INJECTION INTRAMUSCULAR; INTRAVENOUS
Status: DISCONTINUED
Start: 2025-03-11 | End: 2025-03-11

## 2025-03-11 RX ADMIN — SODIUM CHLORIDE: 9 INJECTION, SOLUTION INTRAVENOUS at 10:15:00

## 2025-03-11 NOTE — PROCEDURES
[facility]  part of [facility]  Procedure Note    Daisy Hamm Patient Status:  Outpatient    3/22/1970 MRN [id number]   Location [facility] INTERVENTIONAL SUITES Attending Tere Ferrell APRN   Hosp Day # 0 PCP Maria Fernanda Ribera MD     Procedure: Ultrasound guided core liver parenchymal biopsy    Pre-Procedure Diagnosis: Elevated liver enzymes [R74.8]  Fatty liver [K76.0]      Post-Procedure Diagnosis: Elevated liver enzymes [R74.8]  Fatty liver [K76.0]    Anesthesia:  Local and Sedation    Findings: Under ultrasound guidance, two 18G core biopsy samples were obtained from the left lobe of the liver via subxiphoid approach.    Specimens: Two 18G core liver samples to pathology    Blood Loss: 2 mL      Complications:  None    Plan: Observe for 2 hours prior to discharge home.    KRISTA LAZCANO MD  3/11/2025

## 2025-03-11 NOTE — H&P
[facility]  part of [facility]   History & Physical    Daisy Hamm Patient Status:  Outpatient    3/22/1970 MRN [id number]   Location [facility] INTERVENTIONAL SUITES Attending Tere Ferrell APRN   Hosp Day # 0 PCP Maria Fernanda Ribera MD     Admitting Diagnosis:   Elevated liver enzymes    History of Present Illness:   55y/o woman with elevated liver enzymes.    History   Past Medical History:  Past Medical History:    Abnormal mammogram of left breast    Anaplastic large cell lymphoma, unspecified site, extranodal and solid organ sites    Anxiety state    Depression    DVT (deep venous thrombosis) (HCC)    High cholesterol    History of blood transfusion    Hypothyroidism    Hypothyroidism, primary    Latent tuberculosis    Other malignant lymphomas of intra-abdominal lymph nodes    Personal history of antineoplastic chemotherapy    Port catheter in place    Pre-diabetes    Rheumatoid arthritis (HCC)    Sleep apnea    NO DEVICE USED.    Swelling of limb    Visual impairment    GLASSES       Past Surgical History:  Past Surgical History:   Procedure Laterality Date    Bone marrow aspirate &biopsy  05/15/2014    Bone marrow/blood-derived peripheral stem cell transplantat; allogeneic donor lymphocyte infusions      stem cell transplant    Colonoscopy N/A 2019    Procedure: COLONOSCOPY;  Surgeon: Larry Ladd MD;  Location: [facility] ENDOSCOPY    Hernia surgery  1999    Umbilical hernia repair    Ofe biopsy stereo nodule 1 site left (cpt=19081)          Ofe biopsy stereo nodule 2 site bilat (cpt=19081/39814)  2015    Other surgical history      Injection Tendon Sheath, Ligament    Other surgical history Bilateral 2024    nerve surgery in arms    Tubal ligation         Social History:  Social History     Tobacco Use    Smoking status: Never    Smokeless tobacco: Never   Substance Use Topics    Alcohol use: Not Currently        Family  History:  Family History   Problem Relation Age of Onset    Other (Other) Mother         Rheumatoid Arthritis    Diabetes Father     Heart Disease Father     Thyroid disease Sister     Other (lupus) Sister     Other (sjogren's) Sister     Breast Cancer Sister 47    Other (Other) Brother         Hemochromatosis    Melanoma Maternal Grandfather     Breast Cancer Paternal Aunt 65    Heart Disease Other         Grandparents       Allergies/Medications:   Allergies:  Allergies[1]    Medications:  No current outpatient medications on file.    Physical Exam & Review of Systems:   Physical Exam:    BP 96/69 (BP Location: Left arm)   Pulse 82   Temp 97.1 °F (36.2 °C)   Resp 18   Ht 66\"   Wt 239 lb (108.4 kg)   LMP  (LMP Unknown)   SpO2 96%   BMI 38.58 kg/m²     General: NAD  Neck: No JVD  Lungs: CTA bilat  Heart: RRR, S1, S2  Abdomen: Soft, NT/ND, BS+x4  Extremities: Warm, dry, no LE edema bilat  Neuro: No focal deficits    Results:   Labs:  Recent Labs   Lab 03/08/25  0813   RBC 5.47*   HGB 15.8   HCT 47.0   MCV 85.9   MCH 28.9   MCHC 33.6   RDW 14.4   NEPRELIM 3.65   WBC 6.9   .0     Recent Labs   Lab 03/08/25  0813   PTP 17.1*   INR 1.32*     No results for input(s): \"GLU\", \"BUN\", \"CREATSERUM\", \"GFRAA\", \"GFRNAA\", \"CA\", \"NA\", \"K\", \"CL\", \"CO2\" in the last 168 hours.    Assessment/Plan:   Impression: 53y/o woman with elevated liver enzymes.    Recommendations: Ultrasound guided core liver biopsy.    KRISTA LAZCANO MD  3/11/2025  11:10 AM           [1]   Allergies  Allergen Reactions    Aspirin OTHER (SEE COMMENTS)     Other reaction(s): GI problems

## 2025-03-11 NOTE — DISCHARGE INSTRUCTIONS
INTERVENTIONAL RADIOLOGY  [facility]  [phone]     Patient Name:  Daisy Hamm    Procedure:  Liver Biopsy    Site Care: Remove your band-aid or dressing in 24 hours.  Gently wash area with soap and water.                                       Activity/Diet  No heavy lifting or strenuous activity for 48 hours.  Drink plenty of fluids, unless you have otherwise been told to restrict your fluid intake.  Do not drink alcohol for 24 hours.  Do not drive,  operate heavy machinery, make important decisions or sign legal documents today.    Medications:  No blood thinners for 24 hours.    Contact Interventional Radiology at [phone] if you have severe/unrelieved pain, fever, chills, dizziness/lightheadedness, or drainage/bleeding from your incision site.     Bactrim Pregnancy And Lactation Text: This medication is Pregnancy Category D and is known to cause fetal risk.  It is also excreted in breast milk.

## 2025-03-11 NOTE — PRE-SEDATION ASSESSMENT
[facility]  part of [facility] Pre-Procedure Sedation Assessment    History of snoring or sleep or apnea?   Yes    History of previous problems with anesthesia or sedation  No    Physical Findings:  Neck: nl ROM  CV: regular rate  PULM: normal respiratory rate/effort    Mallampati Score:  II (hard and soft palate, upper portion of tonsils anduvula visible)    ASA Classification:   2. Patient with mild systemic disease    Plan:   -IV moderate sedation    KRISTA LAZCANO MD

## 2025-03-11 NOTE — IVS NOTE
DISCHARGE NOTE      Pt is able to sit up and ambulate without difficulty.   Pt voided and tolerated fluids and food.   Procedural site remains dry and intact with good circulation, motion, and sensation.   No signs and symptoms of bleeding/hematoma noted.   IV access removed  Instruction provided, patient/family verbalizes understanding.      Pt discharge via wheelchair to [facility]      Follow up Appointment: n/a      New Prescription: resume xarelto 3/13 per Dr. Tolbert (pt's oncologist)   headaches; started last night; history of migraines

## 2025-03-11 NOTE — PROGRESS NOTES
Patient is holding room 9, AxO x4. Patient is draped in a sterile fashion. Time out with Dr. Powers and all staff present. 8 mL of lidocaine applied to site and IV sedation of 2 mg versed and 50 mcg fenantyl. Liver biopsy performed with 2 passes. Specimen collected and sent to lab. Sterile dressing applied. Report to EWELINA Mauricio given.

## 2025-03-13 ENCOUNTER — TELEPHONE (OUTPATIENT)
Facility: CLINIC | Age: 55
End: 2025-03-13

## 2025-03-13 NOTE — TELEPHONE ENCOUNTER
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 Pt advised to continue to come in for weekly labs, hold isoniazid, and follow up with hepatology. Pt to make pcp/infectious disease aware of the medication induced hepatitis and that pt is currently holding it.

## 2025-03-13 NOTE — TELEPHONE ENCOUNTER
Contact Dr. Poole from  for clarification on biopsy results. Results look to be associated more towards medication induced hepatis than autoimmune hepatitis.

## 2025-03-15 ENCOUNTER — LAB ENCOUNTER (OUTPATIENT)
Dept: LAB | Age: 55
End: 2025-03-15
Payer: COMMERCIAL

## 2025-03-15 DIAGNOSIS — K76.0 FATTY LIVER: ICD-10-CM

## 2025-03-15 DIAGNOSIS — R79.89 ELEVATED LFTS: ICD-10-CM

## 2025-03-15 LAB
ALBUMIN SERPL-MCNC: 4.1 G/DL (ref 3.2–4.8)
ALP LIVER SERPL-CCNC: 146 U/L
ALT SERPL-CCNC: 448 U/L
AST SERPL-CCNC: 481 U/L (ref ?–34)
BILIRUB DIRECT SERPL-MCNC: 0.4 MG/DL (ref ?–0.3)
BILIRUB SERPL-MCNC: 1 MG/DL (ref 0.3–1.2)
INR BLD: 1.28 (ref 0.8–1.2)
PROT SERPL-MCNC: 6.6 G/DL (ref 5.7–8.2)
PROTHROMBIN TIME: 16.7 SECONDS (ref 11.6–14.8)

## 2025-03-15 PROCEDURE — 36415 COLL VENOUS BLD VENIPUNCTURE: CPT

## 2025-03-15 PROCEDURE — 80076 HEPATIC FUNCTION PANEL: CPT

## 2025-03-15 PROCEDURE — 85610 PROTHROMBIN TIME: CPT

## 2025-03-17 ENCOUNTER — TELEPHONE (OUTPATIENT)
Facility: CLINIC | Age: 55
End: 2025-03-17

## 2025-03-17 NOTE — TELEPHONE ENCOUNTER
Contacted pt to discuss lab results. LFTs have started to down trend. Continue to monitor weekly and hold isoniazid.

## 2025-03-22 ENCOUNTER — LAB ENCOUNTER (OUTPATIENT)
Dept: LAB | Age: 55
End: 2025-03-22
Payer: COMMERCIAL

## 2025-03-22 DIAGNOSIS — R79.89 ELEVATED LFTS: ICD-10-CM

## 2025-03-22 DIAGNOSIS — K76.0 FATTY LIVER: ICD-10-CM

## 2025-03-22 LAB
ALBUMIN SERPL-MCNC: 3.9 G/DL (ref 3.2–4.8)
ALP LIVER SERPL-CCNC: 144 U/L
ALT SERPL-CCNC: 311 U/L
AST SERPL-CCNC: 332 U/L (ref ?–34)
BILIRUB DIRECT SERPL-MCNC: 0.3 MG/DL (ref ?–0.3)
BILIRUB SERPL-MCNC: 0.8 MG/DL (ref 0.3–1.2)
INR BLD: 1.22 (ref 0.8–1.2)
PROT SERPL-MCNC: 6.3 G/DL (ref 5.7–8.2)
PROTHROMBIN TIME: 16.1 SECONDS (ref 11.6–14.8)

## 2025-03-22 PROCEDURE — 85610 PROTHROMBIN TIME: CPT

## 2025-03-22 PROCEDURE — 80076 HEPATIC FUNCTION PANEL: CPT

## 2025-03-22 PROCEDURE — 36415 COLL VENOUS BLD VENIPUNCTURE: CPT

## 2025-03-24 ENCOUNTER — TELEPHONE (OUTPATIENT)
Facility: CLINIC | Age: 55
End: 2025-03-24

## 2025-03-24 NOTE — TELEPHONE ENCOUNTER
Contacted pt to discuss lab results. LFTs have started to down trend. Continue to monitor weekly and hold isoniazid.     Pt states she feels well overall, however has had episodes of nausea intermittently. Offered ondansetron pt refused at this time, states she would rather take pepto as needed. Pt to contact office if symptoms worsen, verbalized understanding.

## 2025-03-29 ENCOUNTER — LAB ENCOUNTER (OUTPATIENT)
Dept: LAB | Age: 55
End: 2025-03-29
Attending: INTERNAL MEDICINE
Payer: COMMERCIAL

## 2025-03-29 DIAGNOSIS — K76.0 FATTY LIVER: ICD-10-CM

## 2025-03-29 DIAGNOSIS — R79.89 ELEVATED LFTS: ICD-10-CM

## 2025-03-29 LAB
ALBUMIN SERPL-MCNC: 3.8 G/DL (ref 3.2–4.8)
ALP LIVER SERPL-CCNC: 136 U/L
ALT SERPL-CCNC: 205 U/L
AST SERPL-CCNC: 214 U/L (ref ?–34)
BILIRUB DIRECT SERPL-MCNC: 0.2 MG/DL (ref ?–0.3)
BILIRUB SERPL-MCNC: 0.6 MG/DL (ref 0.3–1.2)
INR BLD: 1.35 (ref 0.8–1.2)
PROT SERPL-MCNC: 6.5 G/DL (ref 5.7–8.2)
PROTHROMBIN TIME: 17.4 SECONDS (ref 11.6–14.8)

## 2025-03-29 PROCEDURE — 36415 COLL VENOUS BLD VENIPUNCTURE: CPT

## 2025-03-29 PROCEDURE — 85610 PROTHROMBIN TIME: CPT

## 2025-03-29 PROCEDURE — 80076 HEPATIC FUNCTION PANEL: CPT

## 2025-04-02 ENCOUNTER — TELEPHONE (OUTPATIENT)
Facility: CLINIC | Age: 55
End: 2025-04-02

## 2025-04-02 NOTE — TELEPHONE ENCOUNTER
4/2/2025, 1st reminder letter mailed out to patient Via my chart     Imaging ordered:   US BIOPSY LIVER (CPT=76942/38483) (Order #[id number]) on 3/3/25

## 2025-04-03 ENCOUNTER — TELEPHONE (OUTPATIENT)
Facility: CLINIC | Age: 55
End: 2025-04-03

## 2025-04-03 DIAGNOSIS — R79.89 ELEVATED LFTS: Primary | ICD-10-CM

## 2025-04-03 NOTE — TELEPHONE ENCOUNTER
Tere,   Does pt need new standing orders for weekly labs? If so, please place when able.  Thanks,  Lolis

## 2025-04-03 NOTE — TELEPHONE ENCOUNTER
Patient states that she was informed by [facility] lab that the standing orders for blood draw has .  She wants to know if she should still be getting blood drawn every Saturday.  Please call

## 2025-04-03 NOTE — TELEPHONE ENCOUNTER
Pt LFTs continue to down trend. No new symptoms. Pt has appointment with hepatology later this month.   Pt to continue to do weekly labs.

## 2025-04-06 ENCOUNTER — LAB ENCOUNTER (OUTPATIENT)
Dept: LAB | Age: 55
End: 2025-04-06
Payer: COMMERCIAL

## 2025-04-06 DIAGNOSIS — R79.89 ELEVATED LFTS: ICD-10-CM

## 2025-04-06 LAB
ALBUMIN SERPL-MCNC: 3.9 G/DL (ref 3.2–4.8)
ALP LIVER SERPL-CCNC: 129 U/L
ALT SERPL-CCNC: 137 U/L
AST SERPL-CCNC: 152 U/L (ref ?–34)
BILIRUB DIRECT SERPL-MCNC: 0.2 MG/DL (ref ?–0.3)
BILIRUB SERPL-MCNC: 0.6 MG/DL (ref 0.3–1.2)
INR BLD: 1.31 (ref 0.8–1.2)
PROT SERPL-MCNC: 6.3 G/DL (ref 5.7–8.2)
PROTHROMBIN TIME: 17 SECONDS (ref 11.6–14.8)

## 2025-04-06 PROCEDURE — 36415 COLL VENOUS BLD VENIPUNCTURE: CPT

## 2025-04-06 PROCEDURE — 85610 PROTHROMBIN TIME: CPT

## 2025-04-06 PROCEDURE — 80076 HEPATIC FUNCTION PANEL: CPT

## 2025-04-12 ENCOUNTER — LAB ENCOUNTER (OUTPATIENT)
Dept: LAB | Age: 55
End: 2025-04-12
Payer: COMMERCIAL

## 2025-04-12 DIAGNOSIS — R79.89 ELEVATED LFTS: ICD-10-CM

## 2025-04-12 LAB
ALBUMIN SERPL-MCNC: 4 G/DL (ref 3.2–4.8)
ALP LIVER SERPL-CCNC: 125 U/L (ref 46–118)
ALT SERPL-CCNC: 94 U/L (ref 10–49)
AST SERPL-CCNC: 109 U/L (ref ?–34)
BILIRUB DIRECT SERPL-MCNC: 0.3 MG/DL (ref ?–0.3)
BILIRUB SERPL-MCNC: 0.7 MG/DL (ref 0.3–1.2)
INR BLD: 1.33 (ref 0.8–1.2)
PROT SERPL-MCNC: 6.6 G/DL (ref 5.7–8.2)
PROTHROMBIN TIME: 17.3 SECONDS (ref 11.6–14.8)

## 2025-04-12 PROCEDURE — 80076 HEPATIC FUNCTION PANEL: CPT

## 2025-04-12 PROCEDURE — 36415 COLL VENOUS BLD VENIPUNCTURE: CPT

## 2025-04-12 PROCEDURE — 85610 PROTHROMBIN TIME: CPT

## 2025-04-19 ENCOUNTER — LAB ENCOUNTER (OUTPATIENT)
Dept: LAB | Age: 55
End: 2025-04-19
Payer: COMMERCIAL

## 2025-04-19 DIAGNOSIS — R79.89 ELEVATED LFTS: ICD-10-CM

## 2025-04-19 LAB
ALBUMIN SERPL-MCNC: 4 G/DL (ref 3.2–4.8)
ALP LIVER SERPL-CCNC: 115 U/L (ref 46–118)
ALT SERPL-CCNC: 80 U/L (ref 10–49)
AST SERPL-CCNC: 97 U/L (ref ?–34)
BILIRUB DIRECT SERPL-MCNC: 0.3 MG/DL (ref ?–0.3)
BILIRUB SERPL-MCNC: 0.7 MG/DL (ref 0.3–1.2)
INR BLD: 1.16 (ref 0.8–1.2)
PROT SERPL-MCNC: 6.5 G/DL (ref 5.7–8.2)
PROTHROMBIN TIME: 15.4 SECONDS (ref 11.6–14.8)

## 2025-04-19 PROCEDURE — 80076 HEPATIC FUNCTION PANEL: CPT

## 2025-04-19 PROCEDURE — 36415 COLL VENOUS BLD VENIPUNCTURE: CPT

## 2025-04-19 PROCEDURE — 85610 PROTHROMBIN TIME: CPT

## 2025-04-24 ENCOUNTER — OFFICE VISIT (OUTPATIENT)
Facility: CLINIC | Age: 55
End: 2025-04-24

## 2025-04-24 DIAGNOSIS — K71.9 DRUG-INDUCED LIVER INJURY: Primary | ICD-10-CM

## 2025-05-15 ENCOUNTER — HOSPITAL ENCOUNTER (OUTPATIENT)
Dept: MAMMOGRAPHY | Age: 55
Discharge: HOME | End: 2025-05-15
Attending: OBSTETRICS & GYNECOLOGY
Payer: COMMERCIAL

## 2025-05-15 DIAGNOSIS — Z12.31 ENCOUNTER FOR SCREENING MAMMOGRAM FOR BREAST CANCER: ICD-10-CM

## 2025-05-15 PROCEDURE — 77067 SCR MAMMO BI INCL CAD: CPT | Performed by: OBSTETRICS & GYNECOLOGY

## 2025-05-15 PROCEDURE — 77063 BREAST TOMOSYNTHESIS BI: CPT | Performed by: OBSTETRICS & GYNECOLOGY

## 2025-05-24 ENCOUNTER — LAB ENCOUNTER (OUTPATIENT)
Dept: LAB | Facility: REFERENCE LAB | Age: 55
End: 2025-05-24
Attending: INTERNAL MEDICINE
Payer: COMMERCIAL

## 2025-05-24 DIAGNOSIS — K71.9 DRUG-INDUCED LIVER INJURY: ICD-10-CM

## 2025-05-24 LAB
ALBUMIN SERPL-MCNC: 3.9 G/DL (ref 3.2–4.8)
ALP LIVER SERPL-CCNC: 102 U/L (ref 46–118)
ALT SERPL-CCNC: 47 U/L (ref 10–49)
AST SERPL-CCNC: 56 U/L (ref ?–34)
BILIRUB DIRECT SERPL-MCNC: 0.2 MG/DL (ref ?–0.3)
BILIRUB SERPL-MCNC: 0.5 MG/DL (ref 0.3–1.2)
PROT SERPL-MCNC: 6.3 G/DL (ref 5.7–8.2)

## 2025-05-24 PROCEDURE — 36415 COLL VENOUS BLD VENIPUNCTURE: CPT

## 2025-05-24 PROCEDURE — 80076 HEPATIC FUNCTION PANEL: CPT

## 2025-06-21 ENCOUNTER — LAB ENCOUNTER (OUTPATIENT)
Dept: LAB | Facility: REFERENCE LAB | Age: 55
End: 2025-06-21
Attending: INTERNAL MEDICINE
Payer: COMMERCIAL

## 2025-06-21 DIAGNOSIS — K71.9 DRUG-INDUCED LIVER INJURY: ICD-10-CM

## 2025-06-21 DIAGNOSIS — R79.89 ELEVATED LFTS: ICD-10-CM

## 2025-06-21 LAB
ALBUMIN SERPL-MCNC: 4.2 G/DL (ref 3.2–4.8)
ALP LIVER SERPL-CCNC: 91 U/L (ref 46–118)
ALT SERPL-CCNC: 30 U/L (ref 10–49)
AST SERPL-CCNC: 38 U/L (ref ?–34)
BILIRUB DIRECT SERPL-MCNC: 0.2 MG/DL (ref ?–0.3)
BILIRUB SERPL-MCNC: 0.4 MG/DL (ref 0.3–1.2)
PROT SERPL-MCNC: 6.7 G/DL (ref 5.7–8.2)

## 2025-06-21 PROCEDURE — 36415 COLL VENOUS BLD VENIPUNCTURE: CPT

## 2025-06-21 PROCEDURE — 80076 HEPATIC FUNCTION PANEL: CPT

## 2025-07-21 ENCOUNTER — OFFICE VISIT (OUTPATIENT)
Dept: INTERNAL MEDICINE CLINIC | Facility: CLINIC | Age: 55
End: 2025-07-21
Payer: COMMERCIAL

## 2025-07-21 VITALS
SYSTOLIC BLOOD PRESSURE: 116 MMHG | DIASTOLIC BLOOD PRESSURE: 78 MMHG | HEART RATE: 88 BPM | WEIGHT: 247 LBS | BODY MASS INDEX: 39.7 KG/M2 | HEIGHT: 66 IN

## 2025-07-21 DIAGNOSIS — R73.01 IMPAIRED FASTING BLOOD SUGAR: ICD-10-CM

## 2025-07-21 DIAGNOSIS — E55.9 VITAMIN D DEFICIENCY: ICD-10-CM

## 2025-07-21 DIAGNOSIS — E03.9 ACQUIRED HYPOTHYROIDISM: ICD-10-CM

## 2025-07-21 DIAGNOSIS — R74.8 ELEVATED LIVER ENZYMES: ICD-10-CM

## 2025-07-21 DIAGNOSIS — M25.551 RIGHT HIP PAIN: Primary | ICD-10-CM

## 2025-07-21 DIAGNOSIS — E78.00 HYPERCHOLESTEREMIA: ICD-10-CM

## 2025-07-21 DIAGNOSIS — Z00.00 ANNUAL PHYSICAL EXAM: ICD-10-CM

## 2025-07-21 NOTE — PROGRESS NOTES
Daisy Hamm is a 55 year old female.  Chief Complaint   Patient presents with    Acute     Right hip pain     HPI:           The following individual(s) verbally consented to be recorded using ambient AI listening technology and understand that they can each withdraw their consent to this listening technology at any point by asking the clinician to turn off or pause the recording:    Patient name: Daisy Hamm    History of Present Illness  Daisy Hamm is a 55 year old female who presents with right hip pain following a fall.    She experienced right hip pain following a fall approximately one month ago. She tripped over a paving stone and fell forward, hitting her arm and hip on the stairs of her deck. Due to previous arm surgeries, she did not use her arms to catch herself. Initially, she had a large bruise on her arm, but now her right hip continues to hurt significantly, especially after walking or physical activity. The pain is described as persistent and worsening with activity, such as a recent [place] to the zoo, which exacerbated her symptoms. She notes difficulty getting up and walking, feeling 'like trying to get up' and her  commenting on her gait.    She reports experiencing blurry vision, jitteriness, mood changes, and feeling 'hangry,' which improves after eating. She recalls similar symptoms prior to starting metformin, which she has been on for an unspecified duration. She mentions feeling hotter and sweatier than usual, even when indoors.    Her past medical history includes liver issues related to a previous medication, which have since resolved after discontinuation of the medication. She completed over six months of treatment for an unspecified condition under the guidance of an infectious disease doctor. She is currently taking vitamin D supplements and has been advised to limit Tylenol use due to liver concerns.    Socially, she recently retired and is in the  process of adopting three young children, aged ten, seven, and four, which she describes as 'wonderful little blessings' to her family.       Current Medications[1]   Past Medical History[2]   Past Surgical History[3]   Social History:  Short Social Hx on File[4]   Family History[5]   Allergies[6]     REVIEW OF SYSTEMS:   Review of Systems   Review of Systems   Constitutional: Negative for activity change, appetite change and fever.   HENT: Negative for congestion and voice change.    Respiratory: Negative for cough and shortness of breath.    Cardiovascular: Negative for chest pain.   Gastrointestinal: Negative for abdominal distention, abdominal pain and vomiting.   Genitourinary: Negative for hematuria.   Skin: Negative for wound.   Psychiatric/Behavioral: Negative for behavioral problems.   Wt Readings from Last 5 Encounters:   07/21/25 247 lb (112 kg)   03/04/25 239 lb (108.4 kg)   03/11/25 239 lb (108.4 kg)   02/17/25 239 lb 3.2 oz (108.5 kg)   10/28/24 237 lb 12.8 oz (107.9 kg)     Body mass index is 39.87 kg/m².      EXAM:   /78   Pulse 88   Ht 5' 6\" (1.676 m)   Wt 247 lb (112 kg)   BMI 39.87 kg/m²   Physical Exam   Constitutional:       Appearance: Normal appearance.   HENT:      Head: Normocephalic.   Eyes:      Conjunctiva/sclera: Conjunctivae normal.   Breast:  Normal bilateral breast exam. No palpable masses or nodules.   No nipples asymmetry or discharge. No skin changes   Cardiovascular:      Rate and Rhythm: Normal rate and regular rhythm.      Heart sounds: Normal heart sounds. No murmur heard.  Pulmonary:      Effort: Pulmonary effort is normal.      Breath sounds: Normal breath sounds. No rhonchi or rales.   Abdominal:      General: Bowel sounds are normal.      Palpations: Abdomen is soft.      Tenderness: There is no abdominal tenderness.   Musculoskeletal:      Cervical back: Neck supple.      Right lower leg: No edema.      Left lower leg: No edema.   Skin:     General: Skin is warm  and dry.   Neurological:      General: No focal deficit present.      Mental Status: He is alert and oriented to person, place, and time. Mental status is at baseline.   Psychiatric:         Mood and Affect: Mood normal.         Behavior: Behavior normal.       ASSESSMENT AND PLAN:      Assessment & Plan  Right hip pain  - s/p fall   - check X ray  - if  X ray normal, will consider PT  Orders:    XR HIP + PELVIS MIN 4 VIEWS RIGHT (CPT=73503); Future    Annual physical exam  - check blood work and return for annual physical   Orders:    CBC With Differential With Platelet; Future    Hypercholesteremia  - check lipid panel   - on rosuvastatin   Orders:    Lipid Panel; Future    Acquired hypothyroidism  - check TSH   Orders:    TSH W Reflex To Free T4; Future    Elevated liver enzymes  - improving  - follows up with hepatologist        Vitamin D deficiency    Orders:    Vitamin D; Future    Impaired fasting blood sugar  - on metformin   Orders:    Hemoglobin A1C [E]; Future           The patient indicates understanding of these issues and agrees to the plan.      Maria Fernanda Ribera MD          [1]   Current Outpatient Medications   Medication Sig Dispense Refill    rivaroxaban (XARELTO) 10 MG Oral Tab Take 1 tablet (10 mg total) by mouth daily with food. 90 tablet 3    levothyroxine 125 MCG Oral Tab Take 1 tablet (125 mcg total) by mouth before breakfast. 90 tablet 3    rosuvastatin 40 MG Oral Tab TAKE 1 TABLET BY MOUTH EVERY EVENING 90 tablet 3    metFORMIN  MG Oral Tablet 24 Hr Take 1 tablet (500 mg total) by mouth daily. 90 tablet 3    Cholecalciferol (VITAMIN D) 1000 units Oral Tab Take 1,000 Units by mouth daily.        Pyridoxine HCl (B-6) 50 MG Oral Tab Take 1 tablet (50 mg total) by mouth daily. 90 tablet 3    isoniazid 300 MG Oral Tab Take 1 tablet (300 mg total) by mouth daily.     [2]   Past Medical History:   Abnormal mammogram of left breast    Anaplastic large cell lymphoma, unspecified site,  extranodal and solid organ sites    Anxiety state    Depression    DVT (deep venous thrombosis) (HCC)    High cholesterol    History of blood transfusion    Hypothyroidism    Hypothyroidism, primary    Latent tuberculosis    Other malignant lymphomas of intra-abdominal lymph nodes    Personal history of antineoplastic chemotherapy    Port catheter in place    Pre-diabetes    Rheumatoid arthritis (HCC)    Sleep apnea    NO DEVICE USED.    Swelling of limb    Visual impairment    GLASSES   [3]   Past Surgical History:  Procedure Laterality Date    Bone marrow aspirate &biopsy  05/15/2014    Bone marrow/blood-derived peripheral stem cell transplantat; allogeneic donor lymphocyte infusions  2014    stem cell transplant    Colonoscopy N/A 02/18/2019    Procedure: COLONOSCOPY;  Surgeon: Larry Ladd MD;  Location: [facility] ENDOSCOPY    Hernia surgery  11/1999    Umbilical hernia repair    Ofe biopsy stereo nodule 1 site left (cpt=19081)      2015    Ofe biopsy stereo nodule 2 site bilat (cpt=19081/17949)  08/19/2015    Other surgical history      Injection Tendon Sheath, Ligament    Other surgical history Bilateral 06/2024    nerve surgery in arms    Tubal ligation  2001   [4]   Social History  Socioeconomic History    Marital status:    Tobacco Use    Smoking status: Never    Smokeless tobacco: Never   Vaping Use    Vaping status: Never Used   Substance and Sexual Activity    Alcohol use: Not Currently    Drug use: No    Sexual activity: Yes     Partners: Male     Birth control/protection: Tubal Ligation   Other Topics Concern    Caffeine Concern Yes     Comment: Soda, Coffee - 2 cups daily   [5]   Family History  Problem Relation Age of Onset    Cancer Self         non hodgkins    Other (Other) Mother         Rheumatoid Arthritis    Diabetes Father     Heart Disease Father     Thyroid disease Sister     Other (lupus) Sister     Other (sjogren's) Sister     Breast Cancer Sister 47    Other (Other) Brother          Hemochromatosis    Melanoma Maternal Grandfather     Breast Cancer Paternal Aunt 65    Heart Disease Other         Grandparents   [6]   Allergies  Allergen Reactions    Aspirin OTHER (SEE COMMENTS)     Other reaction(s): GI problems    Isoniazid UNKNOWN     Sever drug-induced liver injury

## 2025-07-22 ENCOUNTER — LAB ENCOUNTER (OUTPATIENT)
Dept: LAB | Age: 55
End: 2025-07-22
Attending: INTERNAL MEDICINE
Payer: COMMERCIAL

## 2025-07-22 ENCOUNTER — HOSPITAL ENCOUNTER (OUTPATIENT)
Dept: GENERAL RADIOLOGY | Age: 55
Discharge: HOME | End: 2025-07-22
Attending: INTERNAL MEDICINE
Payer: COMMERCIAL

## 2025-07-22 DIAGNOSIS — M25.551 RIGHT HIP PAIN: ICD-10-CM

## 2025-07-22 DIAGNOSIS — E03.9 ACQUIRED HYPOTHYROIDISM: ICD-10-CM

## 2025-07-22 DIAGNOSIS — E55.9 VITAMIN D DEFICIENCY: ICD-10-CM

## 2025-07-22 DIAGNOSIS — Z00.00 ANNUAL PHYSICAL EXAM: ICD-10-CM

## 2025-07-22 DIAGNOSIS — E78.00 HYPERCHOLESTEREMIA: ICD-10-CM

## 2025-07-22 DIAGNOSIS — R73.01 IMPAIRED FASTING BLOOD SUGAR: ICD-10-CM

## 2025-07-22 LAB
BASOPHILS # BLD AUTO: 0.06 X10(3) UL (ref 0–0.2)
BASOPHILS NFR BLD AUTO: 1 %
CHOLEST SERPL-MCNC: 157 MG/DL (ref ?–200)
DEPRECATED RDW RBC AUTO: 45.1 FL (ref 35.1–46.3)
EOSINOPHIL # BLD AUTO: 0.15 X10(3) UL (ref 0–0.7)
EOSINOPHIL NFR BLD AUTO: 2.6 %
ERYTHROCYTE [DISTWIDTH] IN BLOOD BY AUTOMATED COUNT: 13.4 % (ref 11–15)
EST. AVERAGE GLUCOSE BLD GHB EST-MCNC: 117 MG/DL (ref 68–126)
FASTING PATIENT LIPID ANSWER: YES
HBA1C MFR BLD: 5.7 % (ref ?–5.7)
HCT VFR BLD AUTO: 45.6 % (ref 35–48)
HDLC SERPL-MCNC: 68 MG/DL (ref 40–59)
HGB BLD-MCNC: 15.3 G/DL (ref 12–16)
IMM GRANULOCYTES # BLD AUTO: 0.01 X10(3) UL (ref 0–1)
IMM GRANULOCYTES NFR BLD: 0.2 %
LDLC SERPL CALC-MCNC: 74 MG/DL (ref ?–100)
LYMPHOCYTES # BLD AUTO: 2.45 X10(3) UL (ref 1–4)
LYMPHOCYTES NFR BLD AUTO: 41.7 %
MCH RBC QN AUTO: 30.5 PG (ref 26–34)
MCHC RBC AUTO-ENTMCNC: 33.6 G/DL (ref 31–37)
MCV RBC AUTO: 90.8 FL (ref 80–100)
MONOCYTES # BLD AUTO: 0.49 X10(3) UL (ref 0.1–1)
MONOCYTES NFR BLD AUTO: 8.3 %
NEUTROPHILS # BLD AUTO: 2.72 X10 (3) UL (ref 1.5–7.7)
NEUTROPHILS # BLD AUTO: 2.72 X10(3) UL (ref 1.5–7.7)
NEUTROPHILS NFR BLD AUTO: 46.2 %
NONHDLC SERPL-MCNC: 89 MG/DL (ref ?–130)
PLATELET # BLD AUTO: 188 10(3)UL (ref 150–450)
RBC # BLD AUTO: 5.02 X10(6)UL (ref 3.8–5.3)
T3FREE SERPL-MCNC: 3.27 PG/ML (ref 2.4–4.2)
T4 FREE SERPL-MCNC: 1.5 NG/DL (ref 0.8–1.7)
TRIGL SERPL-MCNC: 82 MG/DL (ref 30–149)
TSI SER-ACNC: 0.13 UIU/ML (ref 0.55–4.78)
VIT D+METAB SERPL-MCNC: 48.8 NG/ML (ref 30–100)
VLDLC SERPL CALC-MCNC: 13 MG/DL (ref 0–30)
WBC # BLD AUTO: 5.9 X10(3) UL (ref 4–11)

## 2025-07-22 PROCEDURE — 84439 ASSAY OF FREE THYROXINE: CPT

## 2025-07-22 PROCEDURE — 84443 ASSAY THYROID STIM HORMONE: CPT

## 2025-07-22 PROCEDURE — 36415 COLL VENOUS BLD VENIPUNCTURE: CPT

## 2025-07-22 PROCEDURE — 85025 COMPLETE CBC W/AUTO DIFF WBC: CPT

## 2025-07-22 PROCEDURE — 73502 X-RAY EXAM HIP UNI 2-3 VIEWS: CPT | Performed by: INTERNAL MEDICINE

## 2025-07-22 PROCEDURE — 80061 LIPID PANEL: CPT

## 2025-07-22 PROCEDURE — 84481 FREE ASSAY (FT-3): CPT

## 2025-07-22 PROCEDURE — 83036 HEMOGLOBIN GLYCOSYLATED A1C: CPT

## 2025-07-22 PROCEDURE — 82306 VITAMIN D 25 HYDROXY: CPT

## 2025-08-25 ENCOUNTER — OFFICE VISIT (OUTPATIENT)
Dept: INTERNAL MEDICINE CLINIC | Facility: CLINIC | Age: 55
End: 2025-08-25

## 2025-08-25 VITALS
BODY MASS INDEX: 39.37 KG/M2 | WEIGHT: 245 LBS | HEART RATE: 82 BPM | DIASTOLIC BLOOD PRESSURE: 73 MMHG | SYSTOLIC BLOOD PRESSURE: 118 MMHG | HEIGHT: 66 IN

## 2025-08-25 DIAGNOSIS — Z86.718 HISTORY OF DVT (DEEP VEIN THROMBOSIS): ICD-10-CM

## 2025-08-25 DIAGNOSIS — E78.00 HYPERCHOLESTEREMIA: ICD-10-CM

## 2025-08-25 DIAGNOSIS — M54.12 CERVICAL MYELOPATHY WITH CERVICAL RADICULOPATHY (HCC): ICD-10-CM

## 2025-08-25 DIAGNOSIS — Z94.84 HISTORY OF STEM CELL TRANSPLANT (HCC): ICD-10-CM

## 2025-08-25 DIAGNOSIS — R73.01 IMPAIRED FASTING BLOOD SUGAR: ICD-10-CM

## 2025-08-25 DIAGNOSIS — Z00.00 ANNUAL PHYSICAL EXAM: Primary | ICD-10-CM

## 2025-08-25 DIAGNOSIS — C84.78 ANAPLASTIC LARGE CELL LYMPHOMA, ALK-NEGATIVE, LYMPH NODES OF MULTIPLE SITES (HCC): ICD-10-CM

## 2025-08-25 DIAGNOSIS — E03.9 ACQUIRED HYPOTHYROIDISM: ICD-10-CM

## 2025-08-25 DIAGNOSIS — G95.9 CERVICAL MYELOPATHY WITH CERVICAL RADICULOPATHY (HCC): ICD-10-CM

## 2025-08-25 DIAGNOSIS — R74.8 ELEVATED LIVER ENZYMES: ICD-10-CM

## (undated) DIAGNOSIS — I89.0 LYMPHEDEMA: Primary | ICD-10-CM

## (undated) DIAGNOSIS — Z12.31 ENCOUNTER FOR SCREENING MAMMOGRAM FOR MALIGNANT NEOPLASM OF BREAST: Primary | ICD-10-CM

## (undated) DEVICE — SOLUTION IRRIG 1000ML 0.9% NACL USP BTL

## (undated) DEVICE — 3M™ TEGADERM™ +PAD FILM DRESSING WITH NON-ADHERENT PAD, 3584, 2-3/8 IN X 4 IN (6 CM X 10 CM), 50/CAR, 4 CAR/CS: Brand: 3M™ TEGADERM™

## (undated) DEVICE — SUT ETHLN 4-0 18IN PS-2 NABSRB BLK 19MM 3/8 C

## (undated) DEVICE — GLOVE SUR 6.5 SENSICARE PI PIP CRM PWD F

## (undated) DEVICE — 3M™ STERI-STRIP™ REINFORCED ADHESIVE SKIN CLOSURES, R1547, 1/2 IN X 4 IN (12 MM X 100 MM), 6 STRIPS/ENVELOPE: Brand: 3M™ STERI-STRIP™

## (undated) DEVICE — GLOVE SUR 7.5 SENSICARE PI PIP GRN PWD F

## (undated) DEVICE — SOLUTION RUBBING 4OZ 70% ISO ALC CLR

## (undated) DEVICE — DRESSING WET L16XW3IN OIL EMUL N ADH CURAD

## (undated) DEVICE — EVACUATOR SUR 100CC SIL BLB WND

## (undated) DEVICE — Device: Brand: CUSTOM PROCEDURE KIT

## (undated) DEVICE — INTENDED TO BE USED TO OCCLUDE, RETRACT AND IDENTIFY ARTERIES, VEINS, TENDONS AND NERVES IN SURGICAL PROCEDURES: Brand: STERION®  VESSEL LOOP

## (undated) DEVICE — OR TOWELS NON-STERILE, 100 BULK, BLUE, 400/CS: Brand: PREMIERPRO

## (undated) DEVICE — SUTURE MCRYL 3-0 27IN ABSRB UD 19MM PS-2 3/8

## (undated) DEVICE — Device: Brand: DEFENDO AIR/WATER/SUCTION AND BIOPSY VALVE

## (undated) DEVICE — DRAPE SUR SM W12XL18IN CLR PLAS REINF ADH

## (undated) DEVICE — MINI-BLADE®: Brand: BEAVER®

## (undated) DEVICE — GLOVE SUR 7 SENSICARE PI PIP CRM PWD F

## (undated) DEVICE — SUT ETHLN 3-0 18IN PS-2 NABSRB BLK 19MM 3/8 C

## (undated) DEVICE — ZZ- DISC- NOSUB-SOLUTION RUBBING 4OZ 70% ISO ALC CLR

## (undated) DEVICE — SUT ETHLN 4-0 18IN NABSRB BLK 19MM PS

## (undated) DEVICE — WAX BNE 2.5GM BEESWAX PAR AND ISO PALMITATE

## (undated) DEVICE — STERILE HOOK LOCK LATEX FREE ELASTIC BANDAGE 2INX5YD: Brand: HOOK LOCK™

## (undated) DEVICE — UPPER EXTREMITY CDS-LF: Brand: MEDLINE INDUSTRIES, INC.

## (undated) DEVICE — EZE-BAND LF ST 3X5 36/CS: Brand: EZE-BAND LF ST 3X5 36/CS

## (undated) DEVICE — ADHESIVE SKIN TOP FOR WND CLSR DERMBND ADV

## (undated) DEVICE — SUT MCRYL 3-0 27IN ABSRB UD 19MM PS-2 3/8

## (undated) DEVICE — SLING ORTH MED 15X8.5IN 32IN

## (undated) DEVICE — DISPOSABLE BIPOLAR FORCEPS 4" (10.2CM) JEWELERS, STRAIGHT 0.4MM TIP AND 12 FT. (3.6M) CABLE: Brand: KIRWAN

## (undated) DEVICE — SUT COAT VCRL+ 0 27IN UR-6 ABSRB VLT ANTIBACT

## (undated) DEVICE — DRAIN CHANNEL 10FR BLAKE

## (undated) DEVICE — PIN SAFETY STERILE (2/PK)

## (undated) DEVICE — GOWN,SIRUS,FABRIC-REINFORCED,X-LARGE: Brand: MEDLINE

## (undated) DEVICE — SUT MCRYL 4-0 18IN PS-2 ABSRB UD 19MM 3/8 CIR

## (undated) DEVICE — LINE MNTR ADLT SET O2 INTMD

## (undated) DEVICE — UNDYED BRAIDED (POLYGLACTIN 910), SYNTHETIC ABSORBABLE SUTURE: Brand: COATED VICRYL

## (undated) DEVICE — SLING ARM L L20IN D7IN DK BLU COT POLY WIDE

## (undated) DEVICE — DISPOSABLE TOURNIQUET CUFF SINGLE BLADDER, DUAL PORT AND QUICK CONNECT CONNECTOR: Brand: COLOR CUFF

## (undated) DEVICE — ANTIBACTERIAL UNDYED BRAIDED (POLYGLACTIN 910), SYNTHETIC ABSORBABLE SUTURE: Brand: COATED VICRYL

## (undated) DEVICE — SPONGE GZ 4XL4IN 100% COT 12 PLY TYP VII WVN

## (undated) DEVICE — SUTURE VCRL 0 L27IN ABSRB VLT L26MM UR-6 5/8

## (undated) DEVICE — SPONGE GZ 4X4IN COT 12 PLY TYP VII WVN C

## (undated) NOTE — LETTER
AUTHORIZATION FOR SURGICAL OPERATION OR OTHER PROCEDURE    1. I hereby authorize Dr. Tu Ritchie, and [facility] staff assigned to my case to perform the following operation and/or procedure at the [facility]:  Right finger cryotherapy  _______________________________________________________________________________________________      _______________________________________________________________________________________________    2. My physician has explained the nature and purpose of the operation or other procedure, possible alternative methods of treatment, the risks involved, and the possibility of complication to me. I acknowledge that no guarantee has been made as to the result that may be obtained. 3.  I recognize that, during the course of this operation, or other procedure, unforseen conditions may necessitate additional or different procedure than those listed above. I, therefore, further authorize and request that the above named physician, his/her physician assistants or designees perform such procedures as are, in his/her professional opinion, necessary and desirable. 4.  Any tissue or organs removed in the operation or other procedure may be disposed of by and at the discretion of the [facility] and [facility]. 5.  I understand that in the event of a medical emergency, I will be transported by local paramedics to [facility] or other hospital emergency department. 6.  I certify that I have read and fully understand the above consent to operation and/or other procedure. 7.  I acknowledge that my physician has explained sedation/analgesia administration to me including the risks and benefits. I consent to the administration of sedation/analgesia as may be necessary or desirable in the judgement of my physician.     Witness signature: ___________________________________________________ Date:  ______/______/_____                    Time: ________ A. M.  P.M. Patient Name:  ______________________________________________________  (please print)      Patient signature:  ___________________________________________________             Relationship to Patient:           []  Parent    Responsible person                          []  Spouse  In case of minor or                    [] Other  _____________   Incompetent name:  __________________________________________________                               (please print)      _____________      Responsible person  In case of minor or  Incompetent signature:  _______________________________________________    Statement of Physician  My signature below affirms that prior to the time of the procedure, I have explained to the patient and/or his/her guardian, the risks and benefits involved in the proposed treatment and any reasonable alternative to the proposed treatment. I have also explained the risks and benefits involved in the refusal of the proposed treatment and have answered the patient's questions.                         Date:  ______/______/_______  Provider                      Signature:  __________________________________________________________       Time:  ___________ A.M    P.M.

## (undated) NOTE — MR AVS SNAPSHOT
Indio Watkins   4/3/2017 4:00 PM   Nurse Only   MRN:  [id number]    Description:  Female : 3/22/1970   Department:  [facility] - Infusion              Visit Summary      Primary Visit Diagnosis     Encounter for adjustmen discharge instructions in [employer] by going to Visits < Admission Summaries. If you've been to the Emergency Department or your doctor's office, you can view your past visit information in [employer] by going to Visits < Visit Summaries. [employer] questions?

## (undated) NOTE — MR AVS SNAPSHOT
Ross Sousa   5/15/2017 4:00 PM   Nurse Only   MRN:  [id number]    Description:  Female : 3/22/1970   Department:  [facility]              Visit Summary      Primary Visit Diagnosis     Encounter for adjustme Summaries. If you've been to the Emergency Department or your doctor's office, you can view your past visit information in [employer] by going to Visits < Visit Summaries. [employer] questions? Call [phone] for help.   [employer] is NOT to be used for urge

## (undated) NOTE — LETTER
[address]  [phone]              22  Re: Bruce Domínguez  : 3/22/1970      To whom it may concern:    Ms. Suresh Layton is a patient in my care diagnosed with stage II lymphedema of the bilateral lower extremities secondary to treatments for non-Hodgkins' Lymphoma including stem cell transplant. Related etiology is positive for obesity. Swelling persists from feet to abdomen. She is also afflicted with pain, skin hyperpigmentation, and impaired mobility. Ms. Suresh Laytno has diligently complied with daily use of compression garments, self-MLD massage, exercises and elevation for over one month. She currently attends lymphedema therapy with certified therapist.  These efforts have failed to adequately control her swelling, and she now has medical necessity for an advanced pneumatic compression pump. I have prescribed the Flexitouch() for daily, lifetime use set to normal pressure using programs L1 (60mins), L4 (45mins), and L6 (16mins, as needed). This pump will treat all areas of swelling with gentle, dynamic pressure and she will use it in conjunction with conservative therapies. Her trail with the Flexitouch was effective and well tolerate. Basic pneumatic compression pumps () are ruled out as a treatment option because her trail with such a pump provided no clinical improvement. Please offer Flexitouch coverage to Ms. Sruesh Layton and contact my office with any questions.       Katelyn Hardwick, [address] [facility]   [phone]

## (undated) NOTE — LETTER
[address]  [address] [facility]  [phone]                                  [facility]:  Q:[phone]  F: [facility]  3/22/1970      Derek Salazar DO  Physiatry   P: [phone]  F: [phone]

## (undated) NOTE — MR AVS SNAPSHOT
Nova Fan   3/17/2017 2:30 PM   Office Visit   MRN:  [id number]    Description:  Female : 3/22/1970   Provider:  Aliza Sahu   Department:  [facility] Hematology Oncology              Visit Summary      Primary Visit Chapito Nye ([phone])   [address] [address]       Monday May 15, 2017 4:00 PM     Appointment with [facility] at [address] [place] [address] ([phone])   [address] [address]       Friday August 18, 2017

## (undated) NOTE — MR AVS SNAPSHOT
Jose Beth   2017 2:30 PM   Office Visit   MRN:  [id number]    Description:  Female : 3/22/1970   Provider:  Yaa Espinoza   Department:  [facility] Hematology Oncology              Visit Summary      Primary Visit Diagnosis Appointment with [facility] at [address] [facility] ([phone])   [address] [address]       Monday May 15, 2017 4:00 PM     Appointment with [facility] at [address] [place] [address] ([phone]

## (undated) NOTE — LETTER
23      Patient: Ashley Room  : 3/22/1970 Visit date: 2023    Dear [facility],      I examined your patient in consultation today. She has an asymptomatic ganglion of the left ulnar wrist.  It is not tender, and does not need to be excised at this point. She has significant hand weakness with markedly diminished  strength. The ganglion is not the cause of her weakness. She has been referred to physiatry for further evaluation and treatment of the hand weakness. Thank you for your kind referral. If I may answer any questions, please feel free to contact me.      Sincerely,   Donta Zelaya MD     CC:   No Recipients

## (undated) NOTE — LETTER
4/2/2025          Daisy Hamm    [address] [place] [address]         Dear Daisy,    Our records indicate that the tests ordered for you by CAROLANN Mckinley  have not been done.  If you have, in fact, already completed the tests or you do not wish to have the tests done, please contact our office at THE NUMBER LISTED BELOW.  Otherwise, please proceed with the testing.  Enclosed is a duplicate order for your convenience.    Imaging ordered:  US BIOPSY LIVER (CPT=76942/93698) (Order #[id number]) on 3/3/25       To schedule a test at any [facility], call Central Scheduling at [phone], Monday through Friday between 7:30am to 6pm and on Saturday between 8am and 1pm.   Evening and weekend appointments for your exam are available.     Sincerely,    CAROLANN Mckinley  [facility]  [address] [facility] [address]  [facility] [id number] [phone]

## (undated) NOTE — MR AVS SNAPSHOT
After Visit Summary   4/3/2017    Melanie Arenas    MRN: [id number]           Visit Information        Provider Department Dept Phone    4/3/2017  4:00  [address] [facility] Chemo Infusion [phone]      Allergies as of 4/3/2017  R experience and are looking for ways to make improvements. Your feedback will help us do so. For more information on [employer], please visit [email or web address]

## (undated) NOTE — MR AVS SNAPSHOT
[address]  [phone]               Thank you for choosing us for your health care visit with Tania Ayala MD.  We are glad to serve you and happy to provide you with this summary of your visit. This list is accurate as of: 6/23/17  2:37 PM.  Always use your most recent med list.                Levothyroxine Sodium 88 MCG Tabs   Take 1 tablet (88 mcg total) by mouth before breakfast.   Commonly known as:  SYNTHROID, LEVOTHROID           magnesium Make half your plate fruits and vegetables Highly refined, white starches including white bread, rice and pasta   Eat plenty of protein, keep the fat content low Sugars:  sodas and sports drinks, candies and desserts   Eat plenty of low-fat dairy products

## (undated) NOTE — LETTER
11/1/2018              [facility], [facility]         Dear Sandra Mcknight records indicate that the lab tests and sleep study ordered for you by Elisabeth Mendoza MD  have not been done.   If you have, in fact, alre